# Patient Record
Sex: FEMALE | Race: WHITE | Employment: OTHER | RURAL
[De-identification: names, ages, dates, MRNs, and addresses within clinical notes are randomized per-mention and may not be internally consistent; named-entity substitution may affect disease eponyms.]

---

## 2017-04-18 DIAGNOSIS — I10 ESSENTIAL HYPERTENSION WITH GOAL BLOOD PRESSURE LESS THAN 130/80: ICD-10-CM

## 2017-04-24 RX ORDER — CLONIDINE HYDROCHLORIDE 0.1 MG/1
TABLET ORAL
Qty: 60 TAB | Refills: 0 | Status: SHIPPED | OUTPATIENT
Start: 2017-04-24 | End: 2017-06-20 | Stop reason: SDUPTHER

## 2017-04-24 NOTE — TELEPHONE ENCOUNTER
Refill request received from [facility] on 4/18/17. Last office visit was 9/21/16.

## 2017-06-20 ENCOUNTER — OFFICE VISIT (OUTPATIENT)
Dept: FAMILY MEDICINE CLINIC | Age: 67
End: 2017-06-20

## 2017-06-20 VITALS
SYSTOLIC BLOOD PRESSURE: 139 MMHG | BODY MASS INDEX: 27.43 KG/M2 | HEART RATE: 65 BPM | DIASTOLIC BLOOD PRESSURE: 78 MMHG | HEIGHT: 68 IN | TEMPERATURE: 99 F | WEIGHT: 181 LBS | RESPIRATION RATE: 16 BRPM | OXYGEN SATURATION: 97 %

## 2017-06-20 DIAGNOSIS — F33.41 RECURRENT MAJOR DEPRESSIVE DISORDER, IN PARTIAL REMISSION (HCC): ICD-10-CM

## 2017-06-20 DIAGNOSIS — I48.0 PAF (PAROXYSMAL ATRIAL FIBRILLATION) (HCC): ICD-10-CM

## 2017-06-20 DIAGNOSIS — I10 ESSENTIAL HYPERTENSION WITH GOAL BLOOD PRESSURE LESS THAN 130/80: Primary | ICD-10-CM

## 2017-06-20 DIAGNOSIS — Z12.31 ENCOUNTER FOR SCREENING MAMMOGRAM FOR MALIGNANT NEOPLASM OF BREAST: ICD-10-CM

## 2017-06-20 DIAGNOSIS — Z13.39 SCREENING FOR ALCOHOLISM: ICD-10-CM

## 2017-06-20 DIAGNOSIS — Z00.00 MEDICARE ANNUAL WELLNESS VISIT, SUBSEQUENT: ICD-10-CM

## 2017-06-20 DIAGNOSIS — M47.9 OSTEOARTHRITIS OF SPINE, UNSPECIFIED SPINAL OSTEOARTHRITIS COMPLICATION STATUS, UNSPECIFIED SPINAL REGION: ICD-10-CM

## 2017-06-20 DIAGNOSIS — E78.00 HYPERCHOLESTEROLEMIA: ICD-10-CM

## 2017-06-20 DIAGNOSIS — E11.9 TYPE 2 DIABETES MELLITUS WITHOUT COMPLICATION, WITHOUT LONG-TERM CURRENT USE OF INSULIN (HCC): ICD-10-CM

## 2017-06-20 DIAGNOSIS — Z98.84 STATUS POST GASTRIC BANDING: ICD-10-CM

## 2017-06-20 DIAGNOSIS — Z00.00 ROUTINE GENERAL MEDICAL EXAMINATION AT A HEALTH CARE FACILITY: ICD-10-CM

## 2017-06-20 RX ORDER — FLUOXETINE HYDROCHLORIDE 20 MG/1
20 CAPSULE ORAL DAILY
Qty: 90 CAP | Refills: 3 | Status: SHIPPED | OUTPATIENT
Start: 2017-06-20 | End: 2019-01-10

## 2017-06-20 RX ORDER — AMLODIPINE BESYLATE 10 MG/1
10 TABLET ORAL DAILY
Qty: 90 TAB | Refills: 3 | Status: SHIPPED | OUTPATIENT
Start: 2017-06-20 | End: 2019-01-16

## 2017-06-20 RX ORDER — LIDOCAINE 50 MG/G
PATCH TOPICAL
Qty: 1 EACH | Refills: 0 | Status: SHIPPED | OUTPATIENT
Start: 2017-06-20 | End: 2019-01-16

## 2017-06-20 RX ORDER — DIGOXIN 125 MCG
TABLET ORAL
Qty: 90 TAB | Refills: 3 | Status: SHIPPED | OUTPATIENT
Start: 2017-06-20 | End: 2019-01-10 | Stop reason: SDUPTHER

## 2017-06-20 RX ORDER — CLONIDINE HYDROCHLORIDE 0.1 MG/1
0.1 TABLET ORAL 2 TIMES DAILY
Qty: 60 TAB | Refills: 5 | Status: SHIPPED | OUTPATIENT
Start: 2017-06-20 | End: 2019-01-10 | Stop reason: SDUPTHER

## 2017-06-20 NOTE — MR AVS SNAPSHOT
Visit Information Date & Time Provider Department Dept. Phone Encounter #  
 6/20/2017  2:40 PM Paxton Velazquez MD [address] [id number] Follow-up Instructions Return in about 6 months (around 12/20/2017) for HTN, DM. Upcoming Health Maintenance Date Due DTaP/Tdap/Td series (1 - Tdap) 11/6/1971 EYE EXAM RETINAL OR DILATED Q1 7/15/2014 GLAUCOMA SCREENING Q2Y 11/6/2015 OSTEOPOROSIS SCREENING (DEXA) 11/6/2015 Pneumococcal 65+ Low/Medium Risk (1 of 2 - PCV13) 11/6/2015 BREAST CANCER SCRN MAMMOGRAM 1/10/2016 HEMOGLOBIN A1C Q6M 3/22/2017 INFLUENZA AGE 9 TO ADULT 8/1/2017 FOOT EXAM Q1 9/21/2017 MICROALBUMIN Q1 9/22/2017 LIPID PANEL Q1 9/22/2017 MEDICARE YEARLY EXAM 6/21/2018 COLONOSCOPY 2/15/2022 Allergies as of 6/20/2017  Review Complete On: 6/20/2017 By: Paxton Velazquez MD  
  
 Severity Noted Reaction Type Reactions Crestor [Rosuvastatin]  05/27/2010    Myalgia Lipitor [Atorvastatin]  05/27/2010    Myalgia Sulfa (Sulfonamide Antibiotics)  05/27/2010    Hives Tetanus Vaccines And Toxoid  11/01/2010    Other (comments) Local reaction--arm red and swollen Current Immunizations  Never Reviewed Name Date Influenza Vaccine 9/16/2016, 10/25/2014 Zoster 12/12/2012 Not reviewed this visit You Were Diagnosed With   
  
 Codes Comments Essential hypertension with goal blood pressure less than 130/80    -  Primary ICD-10-CM: I10 
ICD-9-CM: 401.9 Hypercholesterolemia     ICD-10-CM: E78.00 ICD-9-CM: 272.0 Type 2 diabetes mellitus without complication, without long-term current use of insulin (HCC)     ICD-10-CM: E11.9 ICD-9-CM: 250.00 PAF (paroxysmal atrial fibrillation) (HCC)     ICD-10-CM: I48.0 ICD-9-CM: 427.31  Recurrent major depressive disorder, in partial remission ([facility]     ICD-10-CM: F33.41 
ICD-9-CM: 296.35   
 Status post gastric banding     ICD-10-CM: Z98.84 ICD-9-CM: V45.86 Osteoarthritis of spine, unspecified spinal osteoarthritis complication status, unspecified spinal region     ICD-10-CM: M47.9 ICD-9-CM: 721.90 Vitals BP Pulse Temp Resp Height(growth percentile) Weight(growth percentile) 139/78 (BP 1 Location: Left arm, BP Patient Position: Sitting) 65 99 °F (37.2 °C) (Oral) 16 5' 8\" (1.727 m) 181 lb (82.1 kg) SpO2 BMI OB Status Smoking Status 97% 27.52 kg/m2 Hysterectomy Never Smoker BMI and BSA Data Body Mass Index Body Surface Area  
 27.52 kg/m 2 1.98 m 2 Preferred Pharmacy Pharmacy Name Phone [facility] [address] [phone] Your Updated Medication List  
  
   
This list is accurate as of: 6/20/17  3:47 PM.  Always use your most recent med list. amLODIPine 10 mg tablet Commonly known as:  Nany Half Take 1 Tab by mouth daily. betamethasone valerate 0.1 % topical cream  
Commonly known as:  VALISONE  
APPLY TO AFFECTED AREA 2 TIMES A DAY Blood-Glucose Meter monitoring kit Commonly known as:  BLOOD GLUCOSE MONITORING Check BS daily  
  
 cloNIDine HCl 0.1 mg tablet Commonly known as:  CATAPRES Take 1 Tab by mouth two (2) times a day. digoxin 0.125 mg tablet Commonly known as:  LANOXIN  
TAKE ONE TABLET BY MOUTH EVERY DAY FLUoxetine 20 mg capsule Commonly known as:  PROzac Take 1 Cap by mouth daily. glucosamine-chondroitin 500-400 mg Cap Commonly known as:  20 [facility] Take 1 Cap by mouth daily. lidocaine 5 % Commonly known as:  [place] Apply patch to the affected area for 12 hours a day and remove for 12 hours a day. M-VIT PO Take 1 Tab by mouth daily. magnesium oxide 400 mg tablet Commonly known as:  MAG-OX Take 400 mg by mouth daily. VITAMIN C PO Take 1 Tab by mouth daily. VITAMIN D3 1,000 unit tablet Generic drug:  cholecalciferol Take 1,000 Units by mouth daily. VITAMIN E PO Take 1 Tab by mouth daily. Prescriptions Sent to Pharmacy Refills  
 cloNIDine HCl (CATAPRES) 0.1 mg tablet 5 Sig: Take 1 Tab by mouth two (2) times a day. Class: Normal  
 Pharmacy: [address] [place] [address]. [address] [place] [address] Ph #: [phone] Route: Oral  
 amLODIPine (NORVASC) 10 mg tablet 3 Sig: Take 1 Tab by mouth daily. Class: Normal  
 Pharmacy: [address] [place] [address]. [address] [place] [address] Ph #: [phone] Route: Oral  
 digoxin (LANOXIN) 0.125 mg tablet 3 Sig: TAKE ONE TABLET BY MOUTH EVERY DAY Class: Normal  
 Pharmacy: [address] [place] [address]. Rd[address] [place] [address] Ph #: [phone] FLUoxetine (PROZAC) 20 mg capsule 3 Sig: Take 1 Cap by mouth daily. Class: Normal  
 Pharmacy: [address] [place] [address]. [address] [place] [address] Ph #: [phone] Route: Oral  
 lidocaine (LIDODERM) 5 % 0 Sig: Apply patch to the affected area for 12 hours a day and remove for 12 hours a day. Class: Normal  
 Pharmacy: [address] [place] [address]. [address] [place] [address] Ph #: [phone] We Performed the Following AMB POC FUNDUS PHOTOGRAPHY WITH INTERP AND REPORT [45120 CPT(R)] DIGOXIN [07348 CPT(R)] HEMOGLOBIN A1C WITH EAG [67199 CPT(R)] LIPID PANEL [85094 CPT(R)] METABOLIC PANEL, COMPREHENSIVE [95584 CPT(R)] REFERRAL TO CARDIOLOGY [ZZS38 Custom] Follow-up Instructions Return in about 6 months (around 12/20/2017) for HTN, DM. Referral Information Referral ID Referred By Referred To  
  
 [id number] [address] [place] [address] [address] Phone: [phone] Fax: [phone] Visits Status Start Date End Date 1 New Request 6/20/17 6/20/18 If your referral has a status of pending review or denied, additional information will be sent to support the outcome of this decision. Patient Instructions Learning About High Blood Pressure What is high blood pressure? Blood pressure is a measure of how hard the blood pushes against the walls of your arteries. It's normal for blood pressure to go up and down throughout the day, but if it stays up, you have high blood pressure. Another name for high blood pressure is hypertension. Two numbers tell you your blood pressure. The first number is the systolic pressure. It shows how hard the blood pushes when your heart is pumping. The second number is the diastolic pressure. It shows how hard the blood pushes between heartbeats, when your heart is relaxed and filling with blood. A blood pressure of less than 120/80 (say \"120 over 80\") is ideal for an adult. High blood pressure is 140/90 or higher. You have high blood pressure if your top number is 140 or higher or your bottom number is 90 or higher, or both. Many people fall into the category in between, called prehypertension. People with prehypertension need to make lifestyle changes to bring their blood pressure down and help prevent or delay high blood pressure. What happens when you have high blood pressure? · Blood flows through your arteries with too much force. Over time, this damages the walls of your arteries. But you can't feel it. High blood pressure usually doesn't cause symptoms. · Fat and calcium start to build up in your arteries. This buildup is called plaque. Plaque makes your arteries narrower and stiffer. Blood can't flow through them as easily. · This lack of good blood flow starts to damage some of the organs in your body. This can lead to problems such as coronary artery disease and heart attack, heart failure, stroke, kidney failure, and eye damage. How can you prevent high blood pressure? · Stay at a healthy weight. · Try to limit how much sodium you eat to less than 2,300 milligrams (mg) a day. If you limit your sodium to 1,500 mg a day, you can lower your blood pressure even more. ¨ Buy foods that are labeled \"unsalted,\" \"sodium-free,\" or \"low-sodium. \" Foods labeled \"reduced-sodium\" and \"light sodium\" may still have too much sodium. ¨ Flavor your food with garlic, lemon juice, onion, vinegar, herbs, and spices instead of salt. Do not use soy sauce, steak sauce, onion salt, garlic salt, mustard, or ketchup on your food. ¨ Use less salt (or none) when recipes call for it. You can often use half the salt a recipe calls for without losing flavor. · Be physically active. Get at least 30 minutes of exercise on most days of the week. Walking is a good choice. You also may want to do other activities, such as running, swimming, cycling, or playing tennis or team sports. · Limit alcohol to 2 drinks a day for men and 1 drink a day for women. · Eat plenty of fruits, vegetables, and low-fat dairy products. Eat less saturated and total fats. How is high blood pressure treated? · Your doctor will suggest making lifestyle changes. For example, your doctor may ask you to eat healthy foods, quit smoking, lose extra weight, and be more active. · If lifestyle changes don't help enough or your blood pressure is very high, you will have to take medicine every day. Follow-up care is a key part of your treatment and safety. Be sure to make and go to all appointments, and call your doctor if you are having problems. It's also a good idea to know your test results and keep a list of the medicines you take. Where can you learn more? Go to http://joseph-joel.info/. Enter P501 in the search box to learn more about \"Learning About High Blood Pressure. \" Current as of: March 23, 2016 Content Version: 11.3 © 2358-9141 Healthwise, Incorporated. Care instructions adapted under license by [employer] (which disclaims liability or warranty for this information). If you have questions about a medical condition or this instruction, always ask your healthcare professional. Isha Wisdom any warranty or liability for your use of this information. Medicare Part B Preventive Services Guidelines/Limitations Date last completed and Frequency Due Date Bone Mass Measurement 
(age 72 & older, biennial) Requires diagnosis related to osteoporosis or estrogen deficiency. Biennial benefit unless patient has history of long-term glucocorticoid tx or baseline is needed because initial test was by other method Completed Recommended every 2 years As recommended by your PCP or Specialist 
  
Cardiovascular Screening Blood Tests (every 5 years) Total cholesterol, HDL, Triglycerides Order as a panel if possible Completed 9/22/2016 As recommended by your PCP As recommended by your PCP or Specialist  
Colorectal Cancer Screening 
-Fecal occult blood test (annual) -Flexible sigmoidoscopy (5y) 
-Screening colonoscopy (10y) -Barium Enema Age 49-80; After age [de-identified] if history of abnormal results Completed 2/15/2012 Recommended every 5 to 10 years  As recommended by your PCP or Specialist-[facility] Counseling to Prevent Tobacco Use (up to 8 sessions per year) - Counseling greater than 3 and up to 10 minutes - Counseling greater than 10 minutes Patients must be asymptomatic of tobacco-related conditions to receive as preventive service N/A N/A Diabetes Screening Tests (at least every 3 years, Medicare covers annually or at 6-month intervals for prediabetic patients) Fasting blood sugar (FBS) or glucose tolerance test (GTT) Patient must be diagnosed with one of the following: 
-Hypertension, Dyslipidemia, obesity, previous impaired FBS or GTT Or any two of the following: overweight, FH of diabetes, age ? 72, history of gestational diabetes, birth of baby weighing more than 9 pounds Completed 9/22/2016 Recommended every 3 years for non-diabetics Recommended every 3-6 months for Pre-Diabetics and Diabetics As recommended by your PCP or Specialist 
  
Glaucoma Screening (no USPSTF recommendation) Diabetes mellitus, family history, , age 48 or over,  American, age 72 or over Completed Recommended annually As recommended by your PCP or Specialist- 
Bianka Melara Seasonal Influenza Vaccination (annually)  Completed Recommended Annually Due Fall 2017 TDAP Vaccination  Completed Recommended every 10 years Allergic to Tdap  
Zoster (Shingles) Vaccination Covered by Medicare Part D through the pharmacy- PCP provides prescription Completed 12/12/2012 Recommended once over age 48  Complete Pneumococcal Vaccination (once after 65)  Pneumo 23- Recommended once over the age of 72 Prevnar 13- 11/6/2015 Recommended once over the age of 72 Completed Screening Mammography (biennial age 54-69) Annually (age 36 or over) Completed 1/10/2014 As recommended by your PCP or Specialist-Referral to [address] [place] [address] Screening Pap Tests and Pelvic Examination (up to age 79 and after 79 if unknown history or abnormal study last 10 years) Every 24 months except high risk As recommended by your PCP or Specialist 
 As recommended by your PCP or Specialist 
  
 
 
  
Introducing [facility]! Dear Abram Hernandez: Thank you for requesting a Peak account. Our records indicate that you already have an active Peak account. You can access your account anytime at [email or web address] Did you know that you can access your hospital and ER discharge instructions at any time in Peak?   You can also review all of your test results from your hospital stay or ER visit. Additional Information If you have questions, please visit the Frequently Asked Questions section of the Learning Hyperdrive website at [email or web address] Remember, Learning Hyperdrive is NOT to be used for urgent needs. For medical emergencies, dial 911. Now available from your iPhone and Android! Please provide this summary of care documentation to your next provider. If you have any questions after today's visit, please call [phone].

## 2017-06-20 NOTE — PROGRESS NOTES
Reviewed record in preparation for visit and have necessary documentation  Pt did not bring medication to office visit for review  opportunity was given for questions  Goals that were addressed and/or need to be completed during or after this appointment include     Health Maintenance Due   Topic Date Due    DTaP/Tdap/Td series (1 - Tdap) 11/06/1971    EYE EXAM RETINAL OR DILATED Q1  07/15/2014    GLAUCOMA SCREENING Q2Y  11/06/2015    OSTEOPOROSIS SCREENING (DEXA)  11/06/2015    Pneumococcal 65+ Low/Medium Risk (1 of 2 - PCV13) 11/06/2015    MEDICARE YEARLY EXAM  11/06/2015    BREAST CANCER SCRN MAMMOGRAM  01/10/2016    HEMOGLOBIN A1C Q6M  03/22/2017     - check for functional glucose monitor and record keeping system  Pt was given BS record log to document home readings and return to office for review  Diabetic Bundle:    LDL-  A1C- 6.3  BP- 139/78  Smoking? No  Anticoagulation medication? No  Eye exam dilated?  Due  Foot exam?Yes

## 2017-06-20 NOTE — PATIENT INSTRUCTIONS
Learning About High Blood Pressure  What is high blood pressure? Blood pressure is a measure of how hard the blood pushes against the walls of your arteries. It's normal for blood pressure to go up and down throughout the day, but if it stays up, you have high blood pressure. Another name for high blood pressure is hypertension. Two numbers tell you your blood pressure. The first number is the systolic pressure. It shows how hard the blood pushes when your heart is pumping. The second number is the diastolic pressure. It shows how hard the blood pushes between heartbeats, when your heart is relaxed and filling with blood. A blood pressure of less than 120/80 (say \"120 over 80\") is ideal for an adult. High blood pressure is 140/90 or higher. You have high blood pressure if your top number is 140 or higher or your bottom number is 90 or higher, or both. Many people fall into the category in between, called prehypertension. People with prehypertension need to make lifestyle changes to bring their blood pressure down and help prevent or delay high blood pressure. What happens when you have high blood pressure? · Blood flows through your arteries with too much force. Over time, this damages the walls of your arteries. But you can't feel it. High blood pressure usually doesn't cause symptoms. · Fat and calcium start to build up in your arteries. This buildup is called plaque. Plaque makes your arteries narrower and stiffer. Blood can't flow through them as easily. · This lack of good blood flow starts to damage some of the organs in your body. This can lead to problems such as coronary artery disease and heart attack, heart failure, stroke, kidney failure, and eye damage. How can you prevent high blood pressure? · Stay at a healthy weight. · Try to limit how much sodium you eat to less than 2,300 milligrams (mg) a day.  If you limit your sodium to 1,500 mg a day, you can lower your blood pressure even more.  ¨ Buy foods that are labeled \"unsalted,\" \"sodium-free,\" or \"low-sodium. \" Foods labeled \"reduced-sodium\" and \"light sodium\" may still have too much sodium. ¨ Flavor your food with garlic, lemon juice, onion, vinegar, herbs, and spices instead of salt. Do not use soy sauce, steak sauce, onion salt, garlic salt, mustard, or ketchup on your food. ¨ Use less salt (or none) when recipes call for it. You can often use half the salt a recipe calls for without losing flavor. · Be physically active. Get at least 30 minutes of exercise on most days of the week. Walking is a good choice. You also may want to do other activities, such as running, swimming, cycling, or playing tennis or team sports. · Limit alcohol to 2 drinks a day for men and 1 drink a day for women. · Eat plenty of fruits, vegetables, and low-fat dairy products. Eat less saturated and total fats. How is high blood pressure treated? · Your doctor will suggest making lifestyle changes. For example, your doctor may ask you to eat healthy foods, quit smoking, lose extra weight, and be more active. · If lifestyle changes don't help enough or your blood pressure is very high, you will have to take medicine every day. Follow-up care is a key part of your treatment and safety. Be sure to make and go to all appointments, and call your doctor if you are having problems. It's also a good idea to know your test results and keep a list of the medicines you take. Where can you learn more? Go to http://joseph-joel.info/. Enter P501 in the search box to learn more about \"Learning About High Blood Pressure. \"  Current as of: March 23, 2016  Content Version: 11.3  © 6764-9268 [employer]. Care instructions adapted under license by [employer] (which disclaims liability or warranty for this information).  If you have questions about a medical condition or this instruction, always ask your healthcare professional. Healthwise, Incorporated disclaims any warranty or liability for your use of this information. Medicare Part B Preventive Services Guidelines/Limitations Date last completed and Frequency Due Date   Bone Mass Measurement  (age 72 & older, biennial) Requires diagnosis related to osteoporosis or estrogen deficiency. Biennial benefit unless patient has history of long-term glucocorticoid tx or baseline is needed because initial test was by other method Completed     Recommended every 2 years As recommended by your PCP or Specialist     Cardiovascular Screening Blood Tests (every 5 years)  Total cholesterol, HDL, Triglycerides Order as a panel if possible Completed     9/22/2016    As recommended by your PCP As recommended by your PCP or Specialist   Colorectal Cancer Screening  -Fecal occult blood test (annual)  -Flexible sigmoidoscopy (5y)  -Screening colonoscopy (10y)  -Barium Enema Age 49-80; After age [de-identified] if history of abnormal results Completed      2/15/2012    Recommended every 5 to 10 years  As recommended by your PCP or Specialist-[facility]     Counseling to Prevent Tobacco Use (up to 8 sessions per year)  - Counseling greater than 3 and up to 10 minutes  - Counseling greater than 10 minutes Patients must be asymptomatic of tobacco-related conditions to receive as preventive service N/A N/A   Diabetes Screening Tests (at least every 3 years, Medicare covers annually or at 6-month intervals for prediabetic patients)    Fasting blood sugar (FBS) or glucose tolerance test (GTT) Patient must be diagnosed with one of the following:  -Hypertension, Dyslipidemia, obesity, previous impaired FBS or GTT  Or any two of the following: overweight, FH of diabetes, age ? 72, history of gestational diabetes, birth of baby weighing more than 9 pounds Completed     9/22/2016    Recommended every 3 years for non-diabetics    Recommended every 3-6 months for Pre-Diabetics and Diabetics As recommended by your PCP or Specialist     Glaucoma Screening (no USPSTF recommendation) Diabetes mellitus, family history, , age 48 or over,  American, age 72 or over Completed     Recommended annually As recommended by your PCP or Specialist-  [facility], [place]     Seasonal Influenza Vaccination (annually)  Completed   Recommended Annually Due Fall 2017   TDAP Vaccination  Completed     Recommended every 10 years Allergic to Tdap   Zoster (Shingles) Vaccination Covered by Medicare Part D through the pharmacy- PCP provides prescription Completed     12/12/2012    Recommended once over age 48  Complete   Pneumococcal Vaccination (once after 72)  Pneumo 23-   Recommended once over the age of 72    Prevnar 15- 11/6/2015 Recommended once over the age of 72 Completed            Screening Mammography (biennial age 54-69) Annually (age 36 or over) Completed   1/10/2014 As recommended by your PCP or Specialist-Referral to [address] [place] [address]     Screening Pap Tests and Pelvic Examination (up to age 79 and after 79 if unknown history or abnormal study last 10 years) Every 24 months except high risk As recommended by your PCP or Specialist   As recommended by your PCP or Specialist

## 2017-06-20 NOTE — PROGRESS NOTES
This is a Subsequent Medicare Annual Wellness Visit providing Personalized Prevention Plan Services (PPPS) (Performed 12 months after initial AWV and PPPS )    I have reviewed the patient's medical history in detail and updated the computerized patient record. History     Past Medical History:   Diagnosis Date    Arthritis     Depression     Diabetes ([address]     Hypercholesterolemia     Hypertension     PAF (paroxysmal atrial fibrillation) ([facility])     Sleep apnea     Status post gastric banding       Past Surgical History:   Procedure Laterality Date    CARDIAC CATHETERIZATION      HX APPENDECTOMY      HX GI  2006    lap band    HX HYSTERECTOMY  1995     Current Outpatient Prescriptions   Medication Sig Dispense Refill    cloNIDine HCl (CATAPRES) 0.1 mg tablet Take 1 Tab by mouth two (2) times a day. 60 Tab 5    amLODIPine (NORVASC) 10 mg tablet Take 1 Tab by mouth daily. 90 Tab 3    digoxin (LANOXIN) 0.125 mg tablet TAKE ONE TABLET BY MOUTH EVERY DAY 90 Tab 3    FLUoxetine (PROZAC) 20 mg capsule Take 1 Cap by mouth daily. 90 Cap 3    lidocaine (LIDODERM) 5 % Apply patch to the affected area for 12 hours a day and remove for 12 hours a day. 1 Each 0    betamethasone valerate (VALISONE) 0.1 % topical cream APPLY TO AFFECTED AREA 2 TIMES A DAY 45 g 2    magnesium oxide (MAG-OX) 400 mg tablet Take 400 mg by mouth daily.  glucosamine-chondroitin (ARTHX) 500-400 mg cap Take 1 Cap by mouth daily.  Blood-Glucose Meter (BLOOD GLUCOSE MONITORING) monitoring kit Check BS daily 1 Kit 0    cholecalciferol, vitamin d3, (VITAMIN D) 1,000 unit tablet Take 1,000 Units by mouth daily.  PV W-O MAGDIEL/FERROUS FUMARATE/FA (M-VIT PO) Take 1 Tab by mouth daily.  ASCORBIC ACID (VITAMIN C PO) Take 1 Tab by mouth daily.  VITAMIN E ACETATE (VITAMIN E PO) Take 1 Tab by mouth daily. Medication reconciliation completed by MA/ LPN and reviewed by PCP.       Allergies   Allergen Reactions    Crestor [Rosuvastatin] Myalgia    Lipitor [Atorvastatin] Myalgia    Sulfa (Sulfonamide Antibiotics) Hives    Tetanus Vaccines And Toxoid Other (comments)     Local reaction--arm red and swollen       Family History   Problem Relation Age of Onset    Diabetes Mother     Hypertension Mother     Heart Disease Mother     Stroke Mother     Hypertension Father     Stroke Father     Arthritis-rheumatoid Father     Alcohol abuse Paternal Grandfather     Asthma Neg Hx     Bleeding Prob Neg Hx     Cancer Neg Hx     Elevated Lipids Neg Hx     Headache Neg Hx     Lung Disease Neg Hx     Migraines Neg Hx     Psychiatric Disorder Neg Hx     Mental Retardation Neg Hx      Social History   Substance Use Topics    Smoking status: Never Smoker    Smokeless tobacco: Never Used    Alcohol use No     Patient resides alone and remains independent. Patient Active Problem List   Diagnosis Code    Hypertension I10    Hypercholesterolemia E78.00    Diabetes ([facility]) E11.9    Sleep apnea G47.30    PAF ([facility]oxysmal [facility] fibrillation) ([facility]) I48.0    Status post gastric banding Z98.84    Glucose intolerance (impaired glucose tolerance) R73.02       Depression Risk Factor Screening:     PHQ over the last two weeks 6/20/2017   Little interest or pleasure in doing things Not at all   Feeling down, depressed or hopeless Not at all   Total Score PHQ 2 0     Patient denies thoughts of harm to self or others. Patient states there is a strong support system within friends & family. Patient reports taking Prozac 20mg which is a decrease from 80mg. Alcohol Risk Factor Screening: On any occasion during the past 3 months, have you had more than 3 drinks containing alcohol? No    Do you average more than 7 drinks per week? No      Functional Ability and Level of Safety:     Hearing Loss   none    Activities of Daily Living   Self-care.    Requires assistance with: no ADLs    ADL Assessment 6/20/2017   Feeding yourself No Help Needed   Getting from bed to chair No Help Needed   Getting dressed No Help Needed   Bathing or showering No Help Needed   Walk across the room (includes cane/walker) No Help Needed   Using the telphone No Help Needed   Taking your medications No Help Needed   Preparing meals No Help Needed   Managing money (expenses/bills) No Help Needed   Moderately strenuous housework (laundry) No Help Needed   Shopping for personal items (toiletries/medicines) No Help Needed   Shopping for groceries No Help Needed   Driving No Help Needed   Climbing a flight of stairs No Help Needed   Getting to places beyond walking distances No Help Needed         Fall Risk     Fall Risk Assessment, last 12 mths 6/20/2017   Able to walk? Yes   Fall in past 12 months? No     Patient denies falls within the past year & verbalizes awareness of fall prevention strategies. Abuse Screen   Patient is not abused    Review of Systems   Not required    Physical Examination     Evaluation of Cognitive Function:  Mood/affect:  happy  Appearance: age appropriate, casually dressed and within normal Limits  Family member/caregiver input: None Present. No exam performed today, Medicare Wellness. Patient Care Team:  Rosalba Wilburn RN as Nurse Navigator    Advice/Referrals/Counseling   Education and counseling provided:  Influenza Vaccine  Screening Mammography      Assessment/Plan     RN discussed with patient about an Advanced Medical Directive. Provided patient blank Advanced Medical Directive. RN reviewed Advanced Medical Directive paperwork with patient with a brief description of how to complete the form. Requested that if document completed, to please provide a copy of completed Advanced Medical Directive to PCP office. Patient verbalized understanding. Patient is up to date on the following immunizations: Seasonal Influenza (Fall 2017); Tdap (Allergic reactions); Zoster (completed 12/12/2012);  Pneumococcal (completed 11/6/2015). Patient states that she follows the PCP's recommendations for the following screenings: Mammography (completed 1/10/2014; patient in agreement with referral to [address] [place] [address]), pap/ pelvic (per PCP recommendation), DEXA scan (per PCP recommendation) & colonoscopy (completed 2/15/2012 via [facility]). Patient is established with [employer], [place]. Patient will follow up with Dr. Rice Bernheim, [place] regarding cataract evaluation. Patient verbalized understanding of all information discussed. Patient was given the opportunity to ask questions.

## 2017-06-20 NOTE — ACP (ADVANCE CARE PLANNING)
RN discussed with patient about an Advanced Medical Directive. Provided patient blank Advanced Medical Directive. RN reviewed Advanced Medical Directive paperwork with patient with a brief description of how to complete the form. Requested that if document completed, to please provide a copy of completed Advanced Medical Directive to PCP office. Patient verbalized understanding.

## 2017-06-20 NOTE — PROGRESS NOTES
Progress Note    Patient: Obey Montes De Oca MRN: [id number]  SSN: [id number]    YOB: 1950  Age: 77 y.o. Sex: female        Chief Complaint   Patient presents with    Diabetes    Annual Wellness Visit         Subjective:     Encounter Diagnoses   Name Primary?  Essential hypertension with goal blood pressure less than 130/80 Yes    Hypercholesterolemia     Type 2 diabetes mellitus without complication, without long-term current use of insulin (HCC)     PAF (paroxysmal atrial fibrillation) (HCC)     Recurrent major depressive disorder[facility] partial [facility]     Status post gastric banding     Osteoarthritis of spine, unspecified spinal osteoarthritis complication status, unspecified spinal region      Hypertension: Controlled   BP Readings from Last 3 Encounters:   06/20/17 139/78   09/21/16 138/60   11/04/15 143/82     The patient reports:  taking medications as instructed, no medication side effects noted, no TIA's, no chest pain on exertion, no dyspnea on exertion, no swelling of ankles. Lab Results   Component Value Date/Time    Sodium 142 09/22/2016 08:42 AM    Potassium 4.2 09/22/2016 08:42 AM    Chloride 98 09/22/2016 08:42 AM    CO2 27 09/22/2016 08:42 AM    Anion gap 6 10/26/2009 10:19 AM    Glucose 89 09/22/2016 08:42 AM    BUN 18 09/22/2016 08:42 AM    Creatinine 0.72 09/22/2016 08:42 AM    BUN/Creatinine ratio 25 09/22/2016 08:42 AM    GFR est  09/22/2016 08:42 AM    GFR est non-AA 88 09/22/2016 08:42 AM    Calcium 9.4 09/22/2016 08:42 AM    Bilirubin, total 0.4 09/22/2016 08:42 AM    AST (SGOT) 14 09/22/2016 08:42 AM    Alk. phosphatase 92 09/22/2016 08:42 AM    Protein, total 6.9 09/22/2016 08:42 AM    Albumin 4.1 09/22/2016 08:42 AM    Globulin 3.4 04/20/2009 09:08 AM    A-G Ratio 1.5 09/22/2016 08:42 AM    ALT (SGPT) 11 09/22/2016 08:42 AM     Our goal is to normalize the blood pressure to decrease the risks of strokes and heart attacks.  The patient is in agreement with the plan. Hyperlipidemia:  Stable  Cardiovascular risks for her are: hypertension  hyperlipidemia. Currently she takes none. Lab Results   Component Value Date/Time    Cholesterol, total 190 09/22/2016 08:42 AM    HDL Cholesterol 55 09/22/2016 08:42 AM    LDL, calculated 122 09/22/2016 08:42 AM    Triglyceride 63 09/22/2016 08:42 AM    CHOL/HDL Ratio 4.2 04/20/2009 09:08 AM     Lab Results   Component Value Date/Time    ALT (SGPT) 11 09/22/2016 08:42 AM    AST (SGOT) 14 09/22/2016 08:42 AM    Alk. phosphatase 92 09/22/2016 08:42 AM    Bilirubin, total 0.4 09/22/2016 08:42 AM     Myalgias: No  Fatigue: No  Wt Readings from Last 3 Encounters:   06/20/17 181 lb (82.1 kg)   09/21/16 177 lb 3.2 oz (80.4 kg)   11/04/15 170 lb 14.4 oz (77.5 kg)       AFIB: Reports that she was diagnosed with afib in her late 25s and started on Digoxin. Reports last since cardiology went she went to get the gastric [id number] where she  Reports having normal cardiac catheretization. .  Denies chest pain, edema, dyspnea or dizziness. Aware of irregular heartbeats if anxious for drinking caffiene. No neurologic sx. Avoids caffeine. Does not think that medication is helpful. Diabetes Follow up: diet controlled s/p gastric banding   Overall the patient feels well with good energy level. Diabetic Consultants:Endocrinologist - N/A   Insulin dependence: NO   Pertinent Labs:   Lab Results   Component Value Date/Time    Hemoglobin A1c 6.5 09/22/2016 08:42 AM        Lab Results   Component Value Date/Time    Microalbumin/Creat ratio (mg/g creat) 98 10/26/2009 10:19 AM    Microalb/Creat ratio (ug/mg creat.) 107.0 09/22/2016 08:42 AM    Microalbumin,urine random 6.26 10/26/2009 10:19 AM      Body mass index is 27.52 kg/(m^2).      Lab Results   Component Value Date/Time    LDL, calculated 122 09/22/2016 08:42 AM      Depression: YES     Wt Readings from Last 3 Encounters:   06/20/17 181 lb (82.1 kg)   09/21/16 177 lb 3.2 oz (80.4 kg)   11/04/15 170 lb 14.4 oz (77.5 kg)        History   Smoking Status    Never Smoker   Smokeless Tobacco    Never Used        Questions regarding medications, diet and exercise were answered. Goal of A1C of less than 6.5% discussed. Diabetic foot care was discussed. Depression: Controlled on current medications. The patient has no suicidal ideation, psychotic features or addictions. Current and past medical information:    Current Medications after this visit[de-identified]     Current Outpatient Prescriptions   Medication Sig    cloNIDine HCl (CATAPRES) 0.1 mg tablet Take 1 Tab by mouth two (2) times a day.  amLODIPine (NORVASC) 10 mg tablet Take 1 Tab by mouth daily.  digoxin (LANOXIN) 0.125 mg tablet TAKE ONE TABLET BY MOUTH EVERY DAY    FLUoxetine (PROZAC) 20 mg capsule Take 1 Cap by mouth daily.  lidocaine (LIDODERM) 5 % Apply patch to the affected area for 12 hours a day and remove for 12 hours a day.  betamethasone valerate (VALISONE) 0.1 % topical cream APPLY TO AFFECTED AREA 2 TIMES A DAY    magnesium oxide (MAG-OX) 400 mg tablet Take 400 mg by mouth daily.  glucosamine-chondroitin (ARTHX) 500-400 mg cap Take 1 Cap by mouth daily.  Blood-Glucose Meter (BLOOD GLUCOSE MONITORING) monitoring kit Check BS daily    cholecalciferol, vitamin d3, (VITAMIN D) 1,000 unit tablet Take 1,000 Units by mouth daily.  PV W-O MAGDIEL/FERROUS FUMARATE/FA (M-VIT PO) Take 1 Tab by mouth daily.  ASCORBIC ACID (VITAMIN C PO) Take 1 Tab by mouth daily.  VITAMIN E ACETATE (VITAMIN E PO) Take 1 Tab by mouth daily. No current facility-administered medications for this visit.         Patient Active Problem List    Diagnosis Date Noted    Glucose intolerance (impaired glucose tolerance) 11/01/2010    Hypertension     Hypercholesterolemia     [facility] ([facility]     Sleep apnea     [facility]     Status post gastric banding        Past Medical History:   Diagnosis Date    Arthritis     Depression     [facility]     Hypercholesterolemia     Hypertension     PAF (paroxysmal atrial fibrillation) (HCC)     Sleep apnea     Status post gastric banding        Allergies   Allergen Reactions    Crestor [Rosuvastatin] Myalgia    Lipitor [Atorvastatin] Myalgia    Sulfa (Sulfonamide Antibiotics) Hives    Tetanus Vaccines And Toxoid Other (comments)     Local reaction--arm red and swollen         Past Surgical History:   Procedure Laterality Date    CARDIAC CATHETERIZATION      HX APPENDECTOMY      HX GI  2006    lap band    HX HYSTERECTOMY  1995       Social History     Social History    Marital status: SINGLE     Spouse name: N/A    Number of children: N/A    Years of education: N/A     Social History Main Topics    Smoking status: Never Smoker    Smokeless tobacco: Never Used    Alcohol use No    Drug use: No    Sexual activity: No     Other Topics Concern    None     Social History Narrative       {Review of Systems   Eyes: Negative for double vision, pain and discharge. Respiratory: Negative for shortness of breath and wheezing. Cardiovascular: Positive for palpitations. Negative for chest pain and leg swelling. Gastrointestinal: Negative for abdominal pain, diarrhea, nausea and vomiting. Musculoskeletal: Negative for myalgias. Neurological: Negative for dizziness and tingling. Objective:     Vitals:    06/20/17 1451   BP: 139/78   Pulse: 65   Resp: 16   Temp: 99 °F (37.2 °C)   TempSrc: Oral   SpO2: 97%   Weight: 181 lb (82.1 kg)   Height: 5' 8\" (1.727 m)      Body mass index is 27.52 kg/(m^2). Physical Exam   Constitutional: She is oriented to person, place, and time. She appears well-developed and well-nourished. HENT:   Head: Normocephalic and atraumatic. Neck: Normal range of motion. Neck supple. Cardiovascular: Normal rate and regular rhythm. Pulmonary/Chest: Effort normal and breath sounds normal.   Abdominal: Soft. Bowel sounds are normal.   Musculoskeletal: Normal range of motion. She exhibits no edema. Neurological: She is alert and oriented to person, place, and time. Health Maintenance Due   Topic Date Due    DTaP/Tdap/Td series (1 - Tdap) 11/06/1971    EYE EXAM RETINAL OR DILATED Q1  07/15/2014    GLAUCOMA SCREENING Q2Y  11/06/2015    OSTEOPOROSIS SCREENING (DEXA)  11/06/2015    Pneumococcal 65+ Low/Medium Risk (1 of 2 - PCV13) 11/06/2015    BREAST CANCER SCRN MAMMOGRAM  01/10/2016    HEMOGLOBIN A1C Q6M  03/22/2017       Assessment and orders:     Encounter Diagnoses     ICD-10-CM ICD-9-CM   1. Essential hypertension with goal blood pressure less than 130/80 I10 401.9   2. Hypercholesterolemia E78.00 272.0   3. Type 2 diabetes mellitus without complication, without long-term current use of insulin (HCC) E11.9 250.00   4. PAF (paroxysmal atrial fibrillation) ([facility]) I48.0 427.31   5. Recurrent major depressive disorder, in partial remission ([facility] F33.41 296.35   6. Status post gastric banding Z98.84 V45.86   7. Osteoarthritis of spine, unspecified spinal osteoarthritis complication status, unspecified spinal region M47.9 721.90       1. Essential hypertension with goal blood pressure less than 130/80  Well controlled. - cloNIDine HCl (CATAPRES) 0.1 mg tablet; Take 1 Tab by mouth two (2) times a day. Dispense: 60 Tab; Refill: 5  - METABOLIC PANEL, COMPREHENSIVE  - amLODIPine (NORVASC) 10 mg tablet; Take 1 Tab by mouth daily. Dispense: 90 Tab; Refill: 3    2. Hypercholesterolemia  Recheck level. - LIPID PANEL    3. Type 2 diabetes mellitus without complication, without long-term current use of insulin ([facility])  Diet controlled. - HEMOGLOBIN A1C WITH EAG    4. PAF (paroxysmal atrial fibrillation) ([facility]  Will refer to cardiology as patient is still having palpitations. And refill dig and check level.    - DIGOXIN  - REFERRAL TO CARDIOLOGY    5. Recurrent major depressive disorder, in partial remission [facility])  Well continued. - FLUoxetine (PROZAC) 20 mg capsule; Take 1 Cap by mouth daily. Dispense: 90 Cap; Refill: 3    6. Status post gastric banding  See BMP    7. Osteoarthritis of spine, unspecified spinal osteoarthritis complication status, unspecified spinal region  - lidocaine (LIDODERM) 5 %; Apply patch to the affected area for 12 hours a day and remove for 12 hours a day. Dispense: 1 Each; Refill: 0          Plan of care:  Discussed diagnoses in detail with patient. Medication risks/benefits/side effects discussed with patient. All of the patient's questions were addressed. The patient understands and agrees with our plan of care. The patient knows to call back if they are unsure of or forget any changes we discussed today or if the symptoms change. The patient received an After-Visit Summary which contains VS, orders, medication list and allergy list. This can be used as a \"mini-medical record\" should they have to seek medical care while out of town. Follow-up Disposition: Not on File    No future appointments.     Signed By: Kianna Lopez MD     June 20, 2017

## 2017-06-21 LAB
ALBUMIN SERPL-MCNC: 3.9 G/DL (ref 3.6–4.8)
ALBUMIN/GLOB SERPL: 1.4 {RATIO} (ref 1.2–2.2)
ALP SERPL-CCNC: 96 IU/L (ref 39–117)
ALT SERPL-CCNC: 8 IU/L (ref 0–32)
AST SERPL-CCNC: 12 IU/L (ref 0–40)
BILIRUB SERPL-MCNC: 0.3 MG/DL (ref 0–1.2)
BUN SERPL-MCNC: 30 MG/DL (ref 8–27)
BUN/CREAT SERPL: 38 (ref 12–28)
CALCIUM SERPL-MCNC: 9.1 MG/DL (ref 8.7–10.3)
CHLORIDE SERPL-SCNC: 105 MMOL/L (ref 96–106)
CHOLEST SERPL-MCNC: 167 MG/DL (ref 100–199)
CO2 SERPL-SCNC: 25 MMOL/L (ref 18–29)
CREAT SERPL-MCNC: 0.8 MG/DL (ref 0.57–1)
DIGOXIN SERPL-MCNC: 0.4 NG/ML (ref 0.5–0.9)
EST. AVERAGE GLUCOSE BLD GHB EST-MCNC: 117 MG/DL
GLOBULIN SER CALC-MCNC: 2.8 G/DL (ref 1.5–4.5)
GLUCOSE SERPL-MCNC: 98 MG/DL (ref 65–99)
HBA1C MFR BLD: 5.7 % (ref 4.8–5.6)
HDLC SERPL-MCNC: 58 MG/DL
LDLC SERPL CALC-MCNC: 95 MG/DL (ref 0–99)
POTASSIUM SERPL-SCNC: 4.1 MMOL/L (ref 3.5–5.2)
PROT SERPL-MCNC: 6.7 G/DL (ref 6–8.5)
SODIUM SERPL-SCNC: 146 MMOL/L (ref 134–144)
TRIGL SERPL-MCNC: 68 MG/DL (ref 0–149)
VLDLC SERPL CALC-MCNC: 14 MG/DL (ref 5–40)

## 2017-06-29 DIAGNOSIS — M47.9 OSTEOARTHRITIS OF SPINE, UNSPECIFIED SPINAL OSTEOARTHRITIS COMPLICATION STATUS, UNSPECIFIED SPINAL REGION: ICD-10-CM

## 2017-06-29 RX ORDER — LIDOCAINE 50 MG/G
PATCH TOPICAL
Qty: 1 EACH | Refills: 0 | Status: CANCELLED | OUTPATIENT
Start: 2017-06-29

## 2017-06-29 NOTE — TELEPHONE ENCOUNTER
Pt is calling about her patches. The [employer] is saying it needs a Prior Auth. She states they work so well for it. Prior Auth number is [id number]. Please call her to let her know about it. Thanks.

## 2017-07-12 ENCOUNTER — OFFICE VISIT (OUTPATIENT)
Dept: CARDIOLOGY CLINIC | Age: 67
End: 2017-07-12

## 2017-07-12 VITALS
HEART RATE: 64 BPM | HEIGHT: 68 IN | BODY MASS INDEX: 26.83 KG/M2 | DIASTOLIC BLOOD PRESSURE: 64 MMHG | SYSTOLIC BLOOD PRESSURE: 124 MMHG | OXYGEN SATURATION: 95 % | WEIGHT: 177 LBS | RESPIRATION RATE: 18 BRPM | TEMPERATURE: 96.7 F

## 2017-07-12 DIAGNOSIS — I10 ESSENTIAL HYPERTENSION: ICD-10-CM

## 2017-07-12 DIAGNOSIS — I48.0 PAF (PAROXYSMAL ATRIAL FIBRILLATION) (HCC): Primary | ICD-10-CM

## 2017-07-12 DIAGNOSIS — E78.00 HYPERCHOLESTEROLEMIA: ICD-10-CM

## 2017-07-12 NOTE — PROGRESS NOTES
Reviewed record in preparation for visit and have necessary documentation  Pt did not bring medication to office visit for review  opportunity was given for questions  Goals that were addressed and/or need to be completed during or after this appointment include     Health Maintenance Due   Topic Date Due    DTaP/Tdap/Td series (1 - Tdap) 11/06/1971    GLAUCOMA SCREENING Q2Y  11/06/2015    OSTEOPOROSIS SCREENING (DEXA)  11/06/2015    Pneumococcal 65+ Low/Medium Risk (1 of 2 - PCV13) 11/06/2015    BREAST CANCER SCRN MAMMOGRAM  01/10/2016

## 2017-07-12 NOTE — PROGRESS NOTES
Abbeyne Hopping      1950   Mannie Cerda MD  Date of Visit-7/12/2017 [facility],  PCP=Marbella Poole MD     [facility]. HPI:   Domonique Jade is a 77 y.o. female   Patient comes to see us with a reported history of atrial fibrillation . She says in her 25s she was diagnosed and put on a low-dose of digoxin. Around 2005 she decided to get a lap band procedure and saw Dr. Frantz Morales and told her that she had a hole in her heart.hole in her heart. She said she underwent a heart .cath. She said everything in order to be normal.    She has no sense of palpitations except when she gets stressed. She said she was placed on warfarin back by Dr. Khanh Noel but decided that she could not take it and does not want to take aspirin either. She said no recent EKG and no echocardiogram since at least 2005. Her physical exam is notable for an irregular heartbeat or ectopy and EKG will be done today. She will be an echocardiogram she will need an echocardiogram. This dictation is incomplete an on edited and is not part of the final record record    Assessment/Plans:  1. PAF (paroxysmal atrial fibrillation) ([address]    2. Essential hypertension    3. Hypercholesterolemia       Unclear is paroxysmal  or chronic really but may not matter based on mangement  EKG with afib at 64  Has rate control  Declines stroke prophylaxis   Issue is what is EF and should we continue the digoxin  Will get an echo and decide  Then fu one year    Future Appointments  Date Time Provider [place]   12/20/2017 8:50 AM Julio Cesar Watts MD [facility] DANILO SCHED   7/11/2018 10:40 AM Mannie Cerda MD [address]       Cardiac History:   No specialty comments available. Review of Systems   Constitutional: Negative for chills and fever. HENT: Negative for hearing loss. Eyes: Negative for blurred vision.    Respiratory: Negative for shortness of breath and wheezing. Cardiovascular: Positive for palpitations. Negative for chest pain. Gastrointestinal: Negative for blood in stool, nausea and vomiting. Genitourinary: Negative for hematuria. Musculoskeletal: Positive for back pain and joint pain. Skin: Negative for rash. Neurological: Positive for dizziness. Negative for seizures and loss of consciousness. Endo/Heme/Allergies: Bruises/bleeds easily. Psychiatric/Behavioral: Negative for memory loss. The patient is nervous/anxious. ROS:Cardiac complete  as above. Respiratory as above with no wheezing or hemoptysis. She denies  symptoms of unusual weight loss , fevers,  BRBPR, hematuria,  or recent stroke    Past Medical History:   Diagnosis Date    Arthritis     Depression     Diabetes ([facility] [address]     Hypercholesterolemia     Hypertension     [facility]     Sleep apnea     Status post gastric banding       Exam and Labs:  Visit Vitals    /64 (BP 1 Location: Left arm, BP Patient Position: Sitting)    Pulse 64    Temp 96.7 °F (35.9 °C) (Oral)    Resp 18    Ht 5' 8\" (1.727 m)    Wt 177 lb (80.3 kg)    SpO2 95%    BMI 26.91 kg/m2     Constitutional:  NAD, comfortable , moist mucous membranesHENT: Head: NC,ATEyes: No scleral icterus. Neck:  Neck supple. No JVD present. No tracheal deviation,mass  Chest: Effort normal & normal respiratory excursion     Lungs:breath sounds normal. No stridor. distress, wheezes or  Rales. Heart:normal rate, regular rhythm, normal S1, S2, no murmurs, rubs, clicks or gallops , PMI non displaced. Edema: Edema is none. Extremities:  no clubbing or cyanosis. Abdominal:  no abnormal distension. Neurological: alert, conversant and oriented . Skin: Skin is not cold. No obvious systemic rash noted. Not diaphoretic. No erythema. Psychiatric:  Grossly normal mood and affect.   Behavior appears normal.     Lab Results   Component Value Date/Time    Cholesterol, total 167 06/20/2017 04:01 PM    HDL Cholesterol 58 06/20/2017 04:01 PM    LDL, calculated 95 06/20/2017 04:01 PM    Triglyceride 68 06/20/2017 04:01 PM    CHOL/HDL Ratio 4.2 04/20/2009 09:08 AM     Lab Results   Component Value Date/Time    Sodium 146 06/20/2017 04:01 PM    Potassium 4.1 06/20/2017 04:01 PM    Chloride 105 06/20/2017 04:01 PM    CO2 25 06/20/2017 04:01 PM    Anion gap 6 10/26/2009 10:19 AM    Glucose 98 06/20/2017 04:01 PM    BUN 30 06/20/2017 04:01 PM    Creatinine 0.80 06/20/2017 04:01 PM    BUN/Creatinine ratio 38 06/20/2017 04:01 PM    GFR est AA 89 06/20/2017 04:01 PM    GFR est non-AA 77 06/20/2017 04:01 PM    Calcium 9.1 06/20/2017 04:01 PM      Wt Readings from Last 3 Encounters:   07/12/17 177 lb (80.3 kg)   06/20/17 181 lb (82.1 kg)   09/21/16 177 lb 3.2 oz (80.4 kg)    BP Readings from Last 3 Encounters:   07/12/17 124/64   06/20/17 139/78   09/21/16 138/60        Current Outpatient Prescriptions   Medication Sig    cloNIDine HCl (CATAPRES) 0.1 mg tablet Take 1 Tab by mouth two (2) times a day.  amLODIPine (NORVASC) 10 mg tablet Take 1 Tab by mouth daily.  digoxin (LANOXIN) 0.125 mg tablet TAKE ONE TABLET BY MOUTH EVERY DAY    FLUoxetine (PROZAC) 20 mg capsule Take 1 Cap by mouth daily.  lidocaine (LIDODERM) 5 % Apply patch to the affected area for 12 hours a day and remove for 12 hours a day.  betamethasone valerate (VALISONE) 0.1 % topical cream APPLY TO AFFECTED AREA 2 TIMES A DAY    magnesium oxide (MAG-OX) 400 mg tablet Take 400 mg by mouth daily.  glucosamine-chondroitin (ARTHX) 500-400 mg cap Take 1 Cap by mouth daily.  Blood-Glucose Meter (BLOOD GLUCOSE MONITORING) monitoring kit Check BS daily    cholecalciferol, vitamin d3, (VITAMIN D) 1,000 unit tablet Take 1,000 Units by mouth daily.  PV W-O MAGDIEL/FERROUS FUMARATE/FA (M-VIT PO) Take 1 Tab by mouth daily.  ASCORBIC ACID (VITAMIN C PO) Take 1 Tab by mouth daily.     VITAMIN E ACETATE (VITAMIN E PO) Take 1 Tab by mouth daily. No current facility-administered medications for this visit. Past Surgical History:   Procedure Laterality Date    CARDIAC CATHETERIZATION      HX APPENDECTOMY      HX GI  2006    lap band    HX HYSTERECTOMY  1995      Social Hx=  reports that she has never smoked. She has never used smokeless tobacco. She reports that she does not drink alcohol or use illicit drugs. Family Hx= family history includes Alcohol abuse in her paternal grandfather; Arthritis-rheumatoid in her father; Diabetes in her mother; Heart Disease in her mother; Hypertension in her father and mother; Stroke in her father and mother. There is no history of Asthma, Bleeding Prob, Cancer, Elevated Lipids, Headache, Lung Disease, Migraines, Psychiatric Disorder, or Mental Retardation. Impression see above.

## 2017-07-26 ENCOUNTER — CLINICAL SUPPORT (OUTPATIENT)
Dept: CARDIOLOGY CLINIC | Age: 67
End: 2017-07-26

## 2017-07-26 DIAGNOSIS — I10 ESSENTIAL HYPERTENSION: ICD-10-CM

## 2017-07-26 DIAGNOSIS — E78.00 HYPERCHOLESTEROLEMIA: ICD-10-CM

## 2017-07-26 DIAGNOSIS — I48.0 PAF (PAROXYSMAL ATRIAL FIBRILLATION) (HCC): Primary | ICD-10-CM

## 2017-07-31 ENCOUNTER — TELEPHONE (OUTPATIENT)
Dept: CARDIOLOGY CLINIC | Age: 67
End: 2017-07-31

## 2017-07-31 NOTE — PROGRESS NOTES
Heart muscle is strong  Valves fine except moderate TR , we will keep watch  12/20/2017 8:50 AM    Reji Vann [facility]      [facility]         DANILO SCHED  7/11/2018  10:40 AM   Amber Foss MD        [facility] SCHED   No changes in meds needed

## 2017-07-31 NOTE — TELEPHONE ENCOUNTER
Pt would like a call regarding her recent Echo test.  She can be reached at [phone].     Thank you,  Jane Post

## 2017-12-01 RX ORDER — BETAMETHASONE VALERATE 1.2 MG/G
CREAM TOPICAL
Qty: 45 G | Refills: 2 | Status: SHIPPED | OUTPATIENT
Start: 2017-12-01 | End: 2019-01-16

## 2019-01-10 ENCOUNTER — OFFICE VISIT (OUTPATIENT)
Dept: FAMILY MEDICINE CLINIC | Age: 69
End: 2019-01-10

## 2019-01-10 VITALS
OXYGEN SATURATION: 96 % | DIASTOLIC BLOOD PRESSURE: 83 MMHG | RESPIRATION RATE: 18 BRPM | HEIGHT: 68 IN | WEIGHT: 175 LBS | BODY MASS INDEX: 26.52 KG/M2 | HEART RATE: 81 BPM | SYSTOLIC BLOOD PRESSURE: 197 MMHG | TEMPERATURE: 98.8 F

## 2019-01-10 DIAGNOSIS — F33.41 RECURRENT MAJOR DEPRESSIVE DISORDER, IN PARTIAL REMISSION (HCC): ICD-10-CM

## 2019-01-10 DIAGNOSIS — I48.0 PAF (PAROXYSMAL ATRIAL FIBRILLATION) (HCC): ICD-10-CM

## 2019-01-10 DIAGNOSIS — I10 ESSENTIAL HYPERTENSION: Primary | ICD-10-CM

## 2019-01-10 PROBLEM — F32.A DEPRESSION: Chronic | Status: ACTIVE | Noted: 2019-01-10

## 2019-01-10 RX ORDER — CLONIDINE HYDROCHLORIDE 0.1 MG/1
0.1 TABLET ORAL 2 TIMES DAILY
Qty: 60 TAB | Refills: 5 | Status: SHIPPED | OUTPATIENT
Start: 2019-01-10 | End: 2019-01-24 | Stop reason: SDUPTHER

## 2019-01-10 RX ORDER — DIGOXIN 125 MCG
TABLET ORAL
Qty: 90 TAB | Refills: 3 | Status: SHIPPED | OUTPATIENT
Start: 2019-01-10 | End: 2019-05-21 | Stop reason: SDUPTHER

## 2019-01-10 RX ORDER — FLUOXETINE 10 MG/1
10 CAPSULE ORAL DAILY
Qty: 30 CAP | Refills: 0 | Status: SHIPPED | OUTPATIENT
Start: 2019-01-10 | End: 2019-01-24 | Stop reason: SDUPTHER

## 2019-01-10 NOTE — PROGRESS NOTES
[facility]  
Outpatient Resident Progress Note Encounter Date: 1/10/2019 Chief Complaint Patient presents with  Medication Refill  Medication Evaluation Would like to discuss decreasing Prozac dose History of Present Illness Patient is a 76 y.o. female, who presents to clinic for Medication Refill and Medication Evaluation (Would like to discuss decreasing Prozac dose) Patient reports home  to 220 and  to 120. Reports being out of her medication for about 2 weeks. Denies sweats, fever, fatigue, dizziness, lightheadedness, headaches, changes in vision, CP, SOB, abdominal pain or tenderness, nausea, vomiting, dysuria, hematuria, diarrhea, melena, hematochezia, leg swelling, or any other complains at this moment. Patient also requests reducing Prozac dose. Review of Systems A complete ROS was reviewed and only pertinent items documented on HPI. Allergies - reviewed: Allergies Allergen Reactions  Crestor [Rosuvastatin] Myalgia  Lipitor [Atorvastatin] Myalgia  Sulfa (Sulfonamide Antibiotics) Hives  Tetanus Vaccines And Toxoid Other (comments) Local reaction--arm red and swollen Medications - reviewed:  
Current Outpatient Medications Medication Sig  cloNIDine HCl (CATAPRES) 0.1 mg tablet Take 1 Tab by mouth two (2) times a day.  digoxin (LANOXIN) 0.125 mg tablet TAKE ONE TABLET BY MOUTH EVERY DAY  FLUoxetine (PROZAC) 10 mg capsule Take 1 Cap by mouth daily.  magnesium oxide (MAG-OX) 400 mg tablet Take 400 mg by mouth daily.  Blood-Glucose Meter (BLOOD GLUCOSE MONITORING) monitoring kit Check BS daily  PV W-O MAGDIEL/FERROUS FUMARATE/FA (M-VIT PO) Take 1 Tab by mouth daily.  betamethasone valerate (VALISONE) 0.1 % topical cream APPLY TO AFFECTED AREA 2 TIMES A DAY  amLODIPine (NORVASC) 10 mg tablet Take 1 Tab by mouth daily.  lidocaine (LIDODERM) 5 % Apply patch to the affected area for 12 hours a day and remove for 12 hours a day.  glucosamine-chondroitin (ARTHX) 500-400 mg cap Take 1 Cap by mouth daily.  cholecalciferol, vitamin d3, (VITAMIN D) 1,000 unit tablet Take 1,000 Units by mouth daily.  ASCORBIC ACID (VITAMIN C PO) Take 1 Tab by mouth daily.  VITAMIN E ACETATE (VITAMIN E PO) Take 1 Tab by mouth daily. No current facility-administered medications for this visit. Past Medical History - reviewed: 
Past Medical History:  
Diagnosis Date  Arthritis  Depression  Diabetes ([facility] [address]  Hypercholesterolemia  Hypertension  PAF (paroxysmal atrial fibrillation) ([facility] [address]  Sleep apnea  Status post gastric banding Family Medical History - reviewed: 
Family History Problem Relation Age of Onset  Diabetes Mother  Hypertension Mother  Heart Disease Mother  Stroke Mother  Hypertension Father  Stroke Father  Arthritis-rheumatoid Father  Alcohol abuse Paternal Grandfather  Asthma Neg Hx  Bleeding Prob Neg Hx  Cancer Neg Hx  Elevated Lipids Neg Hx   
 Headache Neg Hx  Lung Disease Neg Hx  Migraines Neg Hx  Psychiatric Disorder Neg Hx  Mental Retardation Neg Hx Objective Visit Vitals /83 (BP 1 Location: Left arm, BP Patient Position: Sitting) Pulse 81 Temp 98.8 °F (37.1 °C) (Oral) Resp 18 Ht 5' 8\" (1.727 m) Wt 175 lb (79.4 kg) SpO2 96% BMI 26.61 kg/m² Body mass index is 26.61 kg/m². Nursing note and vitals reviewed. The following vital sign(s) noted to be abnormal: Elevated BP, higher than goal BP < 140/90. Physical Exam 
Constitutional: Well-developed, well-nourished, and in no distress. Cardiovascular: Normal rate, regular rhythm, normal heart sounds and intact distal pulses. Exam reveals no gallop and no friction rub. No murmur heard. Pulmonary/Chest: Effort normal and breath sounds normal. No respiratory distress. No wheezes, no rales, no tenderness. Abdominal: Soft. Bowel sounds are normal. No distension and no mass. There is no tenderness. There is no rebound and no guarding. Musculoskeletal: Normal range of motion. Exhibits no edema, tenderness or deformity. Neurological: Alert and oriented to person, place, and time. Normal reflexes. No cranial nerve deficit. Gait normal. Coordination normal.  
Skin: Skin is warm and dry. No rash noted. Not diaphoretic. No erythema. No pallor. Psychiatric: Mood, memory, affect and judgment normal.  
 
Assessment / Plan Ms. Shadi Banda is a 76 y.o. female with the following medical condition(s): 1. Essential hypertension Elevated BP in the office was discussed with patient, responded well to Clonidine 0.2 mg PO. Pt ran out of medication about 2 weeks ago. Importance of medication adherence was discussed with the patient and she reported to understand the information provided. A log was provided and the patient was instructed on taking measurements right after waking up in the morning and just before bedtime. These results will be evaluated during next visit that was recommended to be within the next 2 weeks. Patient was counseled about controlling sodium intake. No adjustments to medications during this encounter.   
- BP goal for this patient is < 140/90 mmHg - Follow up in 1 week(s) for elevated BP reevaluation. - cloNIDine HCl (CATAPRES) 0.1 mg tablet; Take 1 Tab by mouth two (2) times a day. Dispense: 60 Tab; Refill: 5 
- LIPID PANEL 
- CBC W/O DIFF 
- METABOLIC PANEL, COMPREHENSIVE 
- TSH 3RD GENERATION 2. PAF (paroxysmal atrial fibrillation) (HCC) 
- digoxin (LANOXIN) 0.125 mg tablet; TAKE ONE TABLET BY MOUTH EVERY DAY  Dispense: 90 Tab; Refill: 3 3. Recurrent major depressive disorder, in partial remission ([address] Patient requested to decrease her dose from 20 mg to 10 mg.  It was discussed with her the risks of continuing this medication but she wants to continue using it as it was prescribed by a Psychiatrist years ago for depression. She is agreeable to try to go down on the medication with aims of either stop it or replace it with a safer SSRI for her age. - FLUoxetine (PROZAC) 10 mg capsule; Take 1 Cap by mouth daily. Dispense: 30 Cap; Refill: 0 Patient was advised to return to clinic in case of worsening of symptoms or fail to improve. Life threatening signs and symptoms were discussed and patient advised to go to the nearest ED for prompt evaluation and care in case of onset of any of these. Follow-up Disposition: 
Return in about 1 week (around 1/17/2019) for Follow up Chronic Conditions. · I have discussed the diagnosis with the patient and the intended plan as seen in the above orders. The patient has received an after-visit summary and questions were answered concerning future plans. I have discussed medication side effects and warnings with the patient as well. Patient/Plan discussed with Dr. Catalina Campos (Attending Physician) Arjun Burrows MD 
PGY-3 Family Medicine Resident Encounter Date: 1/10/2019

## 2019-01-10 NOTE — PROGRESS NOTES
Per verbal order from Dr. Rachael Vásquez, 0.2 mg of clonidine given now at 1350 for high blood pressure

## 2019-01-10 NOTE — PATIENT INSTRUCTIONS
High Blood Pressure: Care Instructions Your Care Instructions If your blood pressure is usually above 130/80, you have high blood pressure, or hypertension. That means the top number is 130 or higher or the bottom number is 80 or higher, or both. Despite what a lot of people think, high blood pressure usually doesn't cause headaches or make you feel dizzy or lightheaded. It usually has no symptoms. But it does increase your risk for heart attack, stroke, and kidney or eye damage. The higher your blood pressure, the more your risk increases. Your doctor will give you a goal for your blood pressure. Your goal will be based on your health and your age. Lifestyle changes, such as eating healthy and being active, are always important to help lower blood pressure. You might also take medicine to reach your blood pressure goal. 
Follow-up care is a key part of your treatment and safety. Be sure to make and go to all appointments, and call your doctor if you are having problems. It's also a good idea to know your test results and keep a list of the medicines you take. How can you care for yourself at home? Medical treatment · If you stop taking your medicine, your blood pressure will go back up. You may take one or more types of medicine to lower your blood pressure. Be safe with medicines. Take your medicine exactly as prescribed. Call your doctor if you think you are having a problem with your medicine. · Talk to your doctor before you start taking aspirin every day. Aspirin can help certain people lower their risk of a heart attack or stroke. But taking aspirin isn't right for everyone, because it can cause serious bleeding. · See your doctor regularly. You may need to see the doctor more often at first or until your blood pressure comes down. · If you are taking blood pressure medicine, talk to your doctor before you take decongestants or anti-inflammatory medicine, such as ibuprofen. Some of these medicines can raise blood pressure. · Learn how to check your blood pressure at home. Lifestyle changes · Stay at a healthy weight. This is especially important if you put on weight around the waist. Losing even 10 pounds can help you lower your blood pressure. · If your doctor recommends it, get more exercise. Walking is a good choice. Bit by bit, increase the amount you walk every day. Try for at least 30 minutes on most days of the week. You also may want to swim, bike, or do other activities. · Avoid or limit alcohol. Talk to your doctor about whether you can drink any alcohol. · Try to limit how much sodium you eat to less than 2,300 milligrams (mg) a day. Your doctor may ask you to try to eat less than 1,500 mg a day. · Eat plenty of fruits (such as bananas and oranges), vegetables, legumes, whole grains, and low-fat dairy products. · Lower the amount of saturated fat in your diet. Saturated fat is found in animal products such as milk, cheese, and meat. Limiting these foods may help you lose weight and also lower your risk for heart disease. · Do not smoke. Smoking increases your risk for heart attack and stroke. If you need help quitting, talk to your doctor about stop-smoking programs and medicines. These can increase your chances of quitting for good. When should you call for help? Call 911 anytime you think you may need emergency care. This may mean having symptoms that suggest that your blood pressure is causing a serious heart or blood vessel problem. Your blood pressure may be over 180/120. 
 For example, call 911 if: 
  · You have symptoms of a heart attack. These may include: 
? Chest pain or pressure, or a strange feeling in the chest. 
? Sweating. ? Shortness of breath. ? Nausea or vomiting. ? Pain, pressure, or a strange feeling in the back, neck, jaw, or upper belly or in one or both shoulders or arms. ? Lightheadedness or sudden weakness. ? A fast or irregular heartbeat.  
  · You have symptoms of a stroke. These may include: 
? Sudden numbness, tingling, weakness, or loss of movement in your face, arm, or leg, especially on only one side of your body. ? Sudden vision changes. ? Sudden trouble speaking. ? Sudden confusion or trouble understanding simple statements. ? Sudden problems with walking or balance. ? A sudden, severe headache that is different from past headaches.  
  · You have severe back or belly pain.  
 Do not wait until your blood pressure comes down on its own. Get help right away. 
 Call your doctor now or seek immediate care if: 
  · Your blood pressure is much higher than normal (such as 180/120 or higher), but you don't have symptoms.  
  · You think high blood pressure is causing symptoms, such as: 
? Severe headache. 
? Blurry vision.  
 Watch closely for changes in your health, and be sure to contact your doctor if: 
  · Your blood pressure measures higher than your doctor recommends at least 2 times. That means the top number is higher or the bottom number is higher, or both.  
  · You think you may be having side effects from your blood pressure medicine. Where can you learn more? Go to http://joseph-joel.info/. Enter I323 in the search box to learn more about \"High Blood Pressure: Care Instructions. \" Current as of: December 6, 2017 Content Version: 11.8 © 7114-6513 [employer]. Care instructions adapted under license by [employer] (which disclaims liability or warranty for this information). If you have questions about a medical condition or this instruction, always ask your healthcare professional. [place] [address] any warranty or liability for your use of this information.

## 2019-01-10 NOTE — PROGRESS NOTES
Chief Complaint Patient presents with  Medication Refill  Medication Evaluation Would like to discuss decreasing Prozac dose Body mass index is 26.61 kg/m². 1. Have you been to the ER, urgent care clinic since your last visit? Hospitalized since your last visit? No 
 
2. Have you seen or consulted any other health care providers outside of the [address] [place] [address] since your last visit? Include any pap smears or colon screening. No 
 
Reviewed record in preparation for visit and have necessary documentation Pt did not bring medication to office visit for review Information was given to pt on Advanced Directives, Living Will Information was given on Shingles Vaccine Opportunity was given for questions Goals that were addressed and/or need to be completed after this appointment include:  
 
Health Maintenance Due Topic Date Due  
 DTaP/Tdap/Td series (1 - Tdap) 11/06/1971  Shingrix Vaccine Age 50> (1 of 2) 11/06/2000  GLAUCOMA SCREENING Q2Y  11/06/2015  Bone Densitometry (Dexa) Screening  11/06/2015  Pneumococcal 65+ Low/Medium Risk (1 of 2 - PCV13) 11/06/2015  BREAST CANCER SCRN MAMMOGRAM  01/10/2016  
 FOOT EXAM Q1  09/21/2017  MICROALBUMIN Q1  09/22/2017  
 HEMOGLOBIN A1C Q6M  12/20/2017  LIPID PANEL Q1  06/20/2018  MEDICARE YEARLY EXAM  06/21/2018  Influenza Age 5 to Adult  08/01/2018

## 2019-01-12 LAB
ALBUMIN SERPL-MCNC: 3.9 G/DL (ref 3.6–4.8)
ALBUMIN/GLOB SERPL: 1.4 {RATIO} (ref 1.2–2.2)
ALP SERPL-CCNC: 75 IU/L (ref 39–117)
ALT SERPL-CCNC: 8 IU/L (ref 0–32)
AST SERPL-CCNC: 14 IU/L (ref 0–40)
BILIRUB SERPL-MCNC: 0.4 MG/DL (ref 0–1.2)
BUN SERPL-MCNC: 19 MG/DL (ref 8–27)
BUN/CREAT SERPL: 18 (ref 12–28)
CALCIUM SERPL-MCNC: 9.1 MG/DL (ref 8.7–10.3)
CHLORIDE SERPL-SCNC: 103 MMOL/L (ref 96–106)
CHOLEST SERPL-MCNC: 164 MG/DL (ref 100–199)
CO2 SERPL-SCNC: 30 MMOL/L (ref 20–29)
CREAT SERPL-MCNC: 1.03 MG/DL (ref 0.57–1)
ERYTHROCYTE [DISTWIDTH] IN BLOOD BY AUTOMATED COUNT: 14.2 % (ref 12.3–15.4)
GLOBULIN SER CALC-MCNC: 2.8 G/DL (ref 1.5–4.5)
GLUCOSE SERPL-MCNC: 89 MG/DL (ref 65–99)
HCT VFR BLD AUTO: 33.3 % (ref 34–46.6)
HDLC SERPL-MCNC: 58 MG/DL
HGB BLD-MCNC: 10.2 G/DL (ref 11.1–15.9)
LDLC SERPL CALC-MCNC: 97 MG/DL (ref 0–99)
MCH RBC QN AUTO: 24.6 PG (ref 26.6–33)
MCHC RBC AUTO-ENTMCNC: 30.6 G/DL (ref 31.5–35.7)
MCV RBC AUTO: 80 FL (ref 79–97)
PLATELET # BLD AUTO: 296 X10E3/UL (ref 150–379)
POTASSIUM SERPL-SCNC: 4.1 MMOL/L (ref 3.5–5.2)
PROT SERPL-MCNC: 6.7 G/DL (ref 6–8.5)
RBC # BLD AUTO: 4.14 X10E6/UL (ref 3.77–5.28)
SODIUM SERPL-SCNC: 144 MMOL/L (ref 134–144)
TRIGL SERPL-MCNC: 47 MG/DL (ref 0–149)
TSH SERPL DL<=0.005 MIU/L-ACNC: 1.44 UIU/ML (ref 0.45–4.5)
VLDLC SERPL CALC-MCNC: 9 MG/DL (ref 5–40)
WBC # BLD AUTO: 5.8 X10E3/UL (ref 3.4–10.8)

## 2019-01-16 PROBLEM — E11.21 TYPE 2 DIABETES WITH NEPHROPATHY (HCC): Status: ACTIVE | Noted: 2019-01-16

## 2019-01-17 ENCOUNTER — OFFICE VISIT (OUTPATIENT)
Dept: FAMILY MEDICINE CLINIC | Age: 69
End: 2019-01-17

## 2019-01-17 VITALS
WEIGHT: 173.6 LBS | OXYGEN SATURATION: 97 % | RESPIRATION RATE: 20 BRPM | SYSTOLIC BLOOD PRESSURE: 146 MMHG | HEIGHT: 68 IN | HEART RATE: 69 BPM | TEMPERATURE: 98.6 F | BODY MASS INDEX: 26.31 KG/M2 | DIASTOLIC BLOOD PRESSURE: 81 MMHG

## 2019-01-17 DIAGNOSIS — I10 ESSENTIAL HYPERTENSION: Primary | ICD-10-CM

## 2019-01-17 DIAGNOSIS — F51.01 PRIMARY INSOMNIA: ICD-10-CM

## 2019-01-17 RX ORDER — LISINOPRIL AND HYDROCHLOROTHIAZIDE 12.5; 2 MG/1; MG/1
1 TABLET ORAL DAILY
Qty: 14 TAB | Refills: 0 | Status: SHIPPED | OUTPATIENT
Start: 2019-01-17 | End: 2019-01-24 | Stop reason: SINTOL

## 2019-01-17 RX ORDER — MELATONIN 5 MG
5 CAPSULE ORAL
Qty: 90 CAP | Refills: 3 | Status: SHIPPED | OUTPATIENT
Start: 2019-01-17 | End: 2019-02-12

## 2019-01-17 NOTE — PROGRESS NOTES
I saw and evaluated the patient with the resident, performing the key elements of the exam and service. I discussed the findings, assessment and plan with the resident and agree with the resident's findings and plan as documented in the resident's note. Patient is well known to me. Erlinda Cabrera M.D.

## 2019-01-17 NOTE — PROGRESS NOTES
[facility]     Outpatient Resident Progress Note    Encounter Date: 1/17/2019    Chief Complaint   Patient presents with    Hypertension    Medication Evaluation       History of Present Illness    Patient is a 76 y.o. female, who presents to clinic for Hypertension and Medication Evaluation  Patient reports home  to 200 and DBP 80 to 110. Reports being compliant with medication and have no SE. Denies sweats, fever, fatigue, dizziness, lightheadedness, headaches, changes in vision, CP, SOB, abdominal pain or tenderness, nausea, vomiting, dysuria, hematuria, diarrhea, melena, hematochezia, leg swelling, or any other complains at this moment. She also reports feeling anxious, especially at night. This have been happening for some time now, and even after the adjustment of Prozac from 20 mg to 10 mg last week. She feels lest worn out after the adjustment of the medication, no changes in anxiety. Denies depression, ideations of hurting herself or others, rush of ideas, or any other concerns. Review of Systems    A complete ROS was reviewed and only pertinent items documented on HPI. Allergies - reviewed: Allergies   Allergen Reactions    Crestor [Rosuvastatin] Myalgia    Lipitor [Atorvastatin] Myalgia    Sulfa (Sulfonamide Antibiotics) Hives    Tetanus Vaccines And Toxoid Other (comments)     Local reaction--arm red and swollen         Medications - reviewed:   Current Outpatient Medications   Medication Sig    lisinopril-hydroCHLOROthiazide (PRINZIDE, ZESTORETIC) 20-12.5 mg per tablet Take 1 Tab by mouth daily.  melatonin 5 mg cap capsule Take 1 Cap by mouth nightly.  cloNIDine HCl (CATAPRES) 0.1 mg tablet Take 1 Tab by mouth two (2) times a day.  digoxin (LANOXIN) 0.125 mg tablet TAKE ONE TABLET BY MOUTH EVERY DAY    FLUoxetine (PROZAC) 10 mg capsule Take 1 Cap by mouth daily.     miscellaneous medical supply (BLOOD PRESSURE CUFF) misc Use to take blood pressure as recommended by MD    magnesium oxide (MAG-OX) 400 mg tablet Take 400 mg by mouth daily.  glucosamine-chondroitin (ARTHX) 500-400 mg cap Take 1 Cap by mouth daily.  Blood-Glucose Meter (BLOOD GLUCOSE MONITORING) monitoring kit Check BS daily    cholecalciferol, vitamin d3, (VITAMIN D) 1,000 unit tablet Take 1,000 Units by mouth daily.  PV W-O MAGDIEL/FERROUS FUMARATE/FA (M-VIT PO) Take 1 Tab by mouth daily.  ASCORBIC ACID (VITAMIN C PO) Take 1 Tab by mouth daily.  VITAMIN E ACETATE (VITAMIN E PO) Take 1 Tab by mouth daily. No current facility-administered medications for this visit. Past Medical History - reviewed:  Past Medical History:   Diagnosis Date    Arthritis     Depression     Diabetes ([facility] [address]     Hypercholesterolemia     Hypertension     PA[facility]oxysmal [facility] fibrillation) ([facility])     Sleep apnea     Status post gastric banding        Family Medical History - reviewed:  Family History   Problem Relation Age of Onset    Diabetes Mother     Hypertension Mother     Heart Disease Mother     Stroke Mother     Hypertension Father     Stroke Father     Arthritis-rheumatoid Father     Alcohol abuse Paternal Grandfather     Asthma Neg Hx     Bleeding Prob Neg Hx     Cancer Neg Hx     Elevated Lipids Neg Hx     Headache Neg Hx     Lung Disease Neg Hx     Migraines Neg Hx     Psychiatric Disorder Neg Hx     Mental Retardation Neg Hx        Objective  Visit Vitals  /81 (BP 1 Location: Left arm, BP Patient Position: Sitting)   Pulse 69   Temp 98.6 °F (37 °C) (Oral)   Resp 20   Ht 5' 8\" (1.727 m)   Wt 173 lb 9.6 oz (78.7 kg)   SpO2 97%   BMI 26.40 kg/m²     Body mass index is 26.4 kg/m². Nursing note and vitals reviewed. Physical Exam  Constitutional: Well-developed, well-nourished, and in no distress. Cardiovascular: Normal rate, regular rhythm, normal heart sounds and intact distal pulses.  Exam reveals no gallop and no friction rub. No murmur heard. Pulmonary/Chest: Effort normal and breath sounds normal. No respiratory distress. No wheezes, no rales, no tenderness. Neurological: Alert and oriented. No focal abnormalities. Skin: Skin is warm and dry. No rash noted. Not diaphoretic. No erythema. No pallor. Psychiatric: Mood, memory, affect and judgment normal.     Assessment / Plan   Ms. Michelle Osborne is a 76 y.o. female with the following medical condition(s):    1. Essential hypertension  Elevated BP in the office was discussed with patient. Pt reports to be compliant with current medications. A log was provided and the patient was instructed on taking measurements right after waking up in the morning and just before bedtime. These results will be evaluated during next visit that was recommended to be within the next 2 weeks. Patient was counseled about controlling sodium intake. Will switch Clonidine for combination of lisinopril and HCTZ. - BP goal for this patient is < 140/90 mmHg  - Follow up in 1 week(s) for elevated BP reevaluation.     - lisinopril-hydroCHLOROthiazide (PRINZIDE, ZESTORETIC) 20-12.5 mg per tablet; Take 1 Tab by mouth daily. Dispense: 14 Tab; Refill: 0    2. Primary insomnia  Discussed sleep hygiene techniques with patient and she agreed on establishing a fixed time to sleep. Will continue follow this through. Recommended using melatonin at this time. - melatonin 5 mg cap capsule; Take 1 Cap by mouth nightly. Dispense: 90 Cap; Refill: 3    Patient was advised to return to clinic in case of worsening of symptoms or fail to improve. Follow-up Disposition:  Return in about 1 week (around 1/24/2019), or if symptoms worsen or fail to improve, for Follow up Chronic Conditions. · I have discussed the diagnosis with the patient and the intended plan as seen in the above orders. The patient has received an after-visit summary and questions were answered concerning future plans.   I have discussed medication side effects and warnings with the patient as well.       Patient/Plan discussed with Dr. Dyan Grant (Attending Physician)      Rajeev Worthington MD  PGY-3 Family Medicine Resident  Encounter Date: 1/17/2019

## 2019-01-17 NOTE — PATIENT INSTRUCTIONS
High Blood Pressure: Care Instructions  Your Care Instructions  If your blood pressure is usually above 130/80, you have high blood pressure, or hypertension. That means the top number is 130 or higher or the bottom number is 80 or higher, or both. Despite what a lot of people think, high blood pressure usually doesn't cause headaches or make you feel dizzy or lightheaded. It usually has no symptoms. But it does increase your risk for heart attack, stroke, and kidney or eye damage. The higher your blood pressure, the more your risk increases. Your doctor will give you a goal for your blood pressure. Your goal will be based on your health and your age. Lifestyle changes, such as eating healthy and being active, are always important to help lower blood pressure. You might also take medicine to reach your blood pressure goal.  Follow-up care is a key part of your treatment and safety. Be sure to make and go to all appointments, and call your doctor if you are having problems. It's also a good idea to know your test results and keep a list of the medicines you take. How can you care for yourself at home? Medical treatment  · If you stop taking your medicine, your blood pressure will go back up. You may take one or more types of medicine to lower your blood pressure. Be safe with medicines. Take your medicine exactly as prescribed. Call your doctor if you think you are having a problem with your medicine. · Talk to your doctor before you start taking aspirin every day. Aspirin can help certain people lower their risk of a heart attack or stroke. But taking aspirin isn't right for everyone, because it can cause serious bleeding. · See your doctor regularly. You may need to see the doctor more often at first or until your blood pressure comes down. · If you are taking blood pressure medicine, talk to your doctor before you take decongestants or anti-inflammatory medicine, such as ibuprofen.  Some of these medicines can raise blood pressure. · Learn how to check your blood pressure at home. Lifestyle changes  · Stay at a healthy weight. This is especially important if you put on weight around the waist. Losing even 10 pounds can help you lower your blood pressure. · If your doctor recommends it, get more exercise. Walking is a good choice. Bit by bit, increase the amount you walk every day. Try for at least 30 minutes on most days of the week. You also may want to swim, bike, or do other activities. · Avoid or limit alcohol. Talk to your doctor about whether you can drink any alcohol. · Try to limit how much sodium you eat to less than 2,300 milligrams (mg) a day. Your doctor may ask you to try to eat less than 1,500 mg a day. · Eat plenty of fruits (such as bananas and oranges), vegetables, legumes, whole grains, and low-fat dairy products. · Lower the amount of saturated fat in your diet. Saturated fat is found in animal products such as milk, cheese, and meat. Limiting these foods may help you lose weight and also lower your risk for heart disease. · Do not smoke. Smoking increases your risk for heart attack and stroke. If you need help quitting, talk to your doctor about stop-smoking programs and medicines. These can increase your chances of quitting for good. When should you call for help? Call 911 anytime you think you may need emergency care. This may mean having symptoms that suggest that your blood pressure is causing a serious heart or blood vessel problem. Your blood pressure may be over 180/120.   For example, call 911 if:    · You have symptoms of a heart attack. These may include:  ? Chest pain or pressure, or a strange feeling in the chest.  ? Sweating. ? Shortness of breath. ? Nausea or vomiting. ? Pain, pressure, or a strange feeling in the back, neck, jaw, or upper belly or in one or both shoulders or arms. ? Lightheadedness or sudden weakness.   ? A fast or irregular heartbeat.     · You have symptoms of a stroke. These may include:  ? Sudden numbness, tingling, weakness, or loss of movement in your face, arm, or leg, especially on only one side of your body. ? Sudden vision changes. ? Sudden trouble speaking. ? Sudden confusion or trouble understanding simple statements. ? Sudden problems with walking or balance. ? A sudden, severe headache that is different from past headaches.     · You have severe back or belly pain.    Do not wait until your blood pressure comes down on its own. Get help right away.   Call your doctor now or seek immediate care if:    · Your blood pressure is much higher than normal (such as 180/120 or higher), but you don't have symptoms.     · You think high blood pressure is causing symptoms, such as:  ? Severe headache.  ? Blurry vision.    Watch closely for changes in your health, and be sure to contact your doctor if:    · Your blood pressure measures higher than your doctor recommends at least 2 times. That means the top number is higher or the bottom number is higher, or both.     · You think you may be having side effects from your blood pressure medicine. Where can you learn more? Go to http://joseph-joel.info/. Enter F724 in the search box to learn more about \"High Blood Pressure: Care Instructions. \"  Current as of: December 6, 2017  Content Version: 11.8  © 5392-5276 [employer]. Care instructions adapted under license by [employer] (which disclaims liability or warranty for this information). If you have questions about a medical condition or this instruction, always ask your healthcare professional. [place] [address] any warranty or liability for your use of this information. Insomnia: Care Instructions  Your Care Instructions  Insomnia is the inability to sleep well. It is a common problem for most people at some time.  Insomnia may make it hard for you to get to sleep, stay asleep, or sleep as long as you need to. This can make you tired and grouchy during the day. It can also make you forgetful, less effective at work, and unhappy. Insomnia can be caused by conditions such as depression or anxiety. Pain can also affect your ability to sleep. When these problems are solved, the insomnia usually clears up. But sometimes bad sleep habits can cause insomnia. If insomnia is affecting your work or your enjoyment of life, you can take steps to improve your sleep. Follow-up care is a key part of your treatment and safety. Be sure to make and go to all appointments, and call your doctor if you are having problems. It's also a good idea to know your test results and keep a list of the medicines you take. How can you care for yourself at home? What to avoid  · Do not have drinks with caffeine, such as coffee or black tea, for 8 hours before bed. · Do not smoke or use other types of tobacco near bedtime. Nicotine is a stimulant and can keep you awake. · Avoid drinking alcohol late in the evening, because it can cause you to wake in the middle of the night. · Do not eat a big meal close to bedtime. If you are hungry, eat a light snack. · Do not drink a lot of water close to bedtime, because the need to urinate may wake you up during the night. · Do not read or watch TV in bed. Use the bed only for sleeping and sexual activity. What to try  · Go to bed at the same time every night, and wake up at the same time every morning. Do not take naps during the day. · Keep your bedroom quiet, dark, and cool. · Sleep on a comfortable pillow and mattress. · If watching the clock makes you anxious, turn it facing away from you so you cannot see the time. · If you worry when you lie down, start a worry book. Well before bedtime, write down your worries, and then set the book and your concerns aside. · Try meditation or other relaxation techniques before you go to bed.   · If you cannot fall asleep, get up and go to another room until you feel sleepy. Do something relaxing. Repeat your bedtime routine before you go to bed again. · Make your house quiet and calm about an hour before bedtime. Turn down the lights, turn off the TV, log off the computer, and turn down the volume on music. This can help you relax after a busy day. When should you call for help? Watch closely for changes in your health, and be sure to contact your doctor if:    · Your efforts to improve your sleep do not work.     · Your insomnia gets worse.     · You have been feeling down, depressed, or hopeless or have lost interest in things that you usually enjoy. Where can you learn more? Go to http://joseph-joel.info/. Enter P513 in the search box to learn more about \"Insomnia: Care Instructions. \"  Current as of: June 29, 2018  Content Version: 11.8  © 5757-4927 [employer]. Care instructions adapted under license by [employer] (which disclaims liability or warranty for this information). If you have questions about a medical condition or this instruction, always ask your healthcare professional. [address] any warranty or liability for your use of this information.

## 2019-01-17 NOTE — PROGRESS NOTES
Chief Complaint   Patient presents with    Hypertension    Medication Evaluation     Body mass index is 26.4 kg/m². 1. Have you been to the ER, urgent care clinic since your last visit? Hospitalized since your last visit? No    2. Have you seen or consulted any other health care providers outside of the [address] [place] [address] since your last visit? Include any pap smears or colon screening.  No    Reviewed record in preparation for visit and have necessary documentation  Pt did not bring medication to office visit for review  Information was given to pt on Advanced Directives, Living Will  Information was given on Shingles Vaccine  Opportunity was given for questions  Goals that were addressed and/or need to be completed after this appointment include:     Health Maintenance Due   Topic Date Due    Shingrix Vaccine Age 49> (1 of 2) 11/06/2000    GLAUCOMA SCREENING Q2Y  11/06/2015    Bone Densitometry (Dexa) Screening  11/06/2015    Pneumococcal 65+ Low/Medium Risk (1 of 2 - PCV13) 11/06/2015    BREAST CANCER SCRN MAMMOGRAM  01/10/2016    FOOT EXAM Q1  09/21/2017    MICROALBUMIN Q1  09/22/2017    HEMOGLOBIN A1C Q6M  12/20/2017    MEDICARE YEARLY EXAM  06/21/2018     Patient refused mammogram and colonoscopy    Our Blood Pressure machine: 146/81  Her Blood Pressure machine: 161/99

## 2019-01-18 ENCOUNTER — TELEPHONE (OUTPATIENT)
Dept: FAMILY MEDICINE CLINIC | Age: 69
End: 2019-01-18

## 2019-01-18 DIAGNOSIS — D51.9 ANEMIA DUE TO VITAMIN B12 DEFICIENCY, UNSPECIFIED B12 DEFICIENCY TYPE: ICD-10-CM

## 2019-01-18 DIAGNOSIS — D64.9 ANEMIA, UNSPECIFIED TYPE: Primary | ICD-10-CM

## 2019-01-18 DIAGNOSIS — D64.9 ANEMIA, UNSPECIFIED TYPE: ICD-10-CM

## 2019-01-18 NOTE — PROGRESS NOTES
Results discussed with patient by phone encounter. Evidence of anemia of unknown cause at this moment. Will work up and discuss during next week follow up appointment. Denise Brooks MD 
PGY-3 Family Medicine Resident

## 2019-01-18 NOTE — TELEPHONE ENCOUNTER
Called patient and discussed lab results. There is evidence of anemia of unclear cause at this time. Will work up for possible causes. Ordered iron profile, ferritin, Vit B12, folate, LDH, haptoglobin levels. Patient reports that mother has a Hx of pernicious anemia. Patient agrees with work up plan and will have labs done today. Will follow up results and discuss with patient during next appointment.      Orders Placed This Encounter    IRON PROFILE     Standing Status:   Future     Standing Expiration Date:   4/18/2019    FERRITIN     Standing Status:   Future     Standing Expiration Date:   4/18/2019    LD     Standing Status:   Future     Standing Expiration Date:   4/18/2019    HAPTOGLOBIN     Standing Status:   Future     Standing Expiration Date:   4/18/2019    VITAMIN B12 & FOLATE     Standing Status:   Future     Standing Expiration Date:   4/18/2019     Monique Lane MD  PGY-3 Family Medicine Resident

## 2019-01-19 LAB
FERRITIN SERPL-MCNC: 26 NG/ML (ref 15–150)
FOLATE SERPL-MCNC: >20 NG/ML
HAPTOGLOB SERPL-MCNC: 74 MG/DL (ref 34–200)
IRON SATN MFR SERPL: 4 % (ref 15–55)
IRON SERPL-MCNC: 17 UG/DL (ref 27–139)
LDH SERPL-CCNC: 157 IU/L (ref 119–226)
TIBC SERPL-MCNC: 445 UG/DL (ref 250–450)
UIBC SERPL-MCNC: 428 UG/DL (ref 118–369)
VIT B12 SERPL-MCNC: 684 PG/ML (ref 232–1245)

## 2019-01-24 ENCOUNTER — OFFICE VISIT (OUTPATIENT)
Dept: FAMILY MEDICINE CLINIC | Age: 69
End: 2019-01-24

## 2019-01-24 VITALS
TEMPERATURE: 98.4 F | SYSTOLIC BLOOD PRESSURE: 118 MMHG | WEIGHT: 175.2 LBS | DIASTOLIC BLOOD PRESSURE: 61 MMHG | BODY MASS INDEX: 26.55 KG/M2 | RESPIRATION RATE: 20 BRPM | HEART RATE: 66 BPM | OXYGEN SATURATION: 97 % | HEIGHT: 68 IN

## 2019-01-24 DIAGNOSIS — F33.41 RECURRENT MAJOR DEPRESSIVE DISORDER, IN PARTIAL REMISSION (HCC): ICD-10-CM

## 2019-01-24 DIAGNOSIS — I10 ESSENTIAL HYPERTENSION: Primary | ICD-10-CM

## 2019-01-24 DIAGNOSIS — D50.9 IRON DEFICIENCY ANEMIA, UNSPECIFIED IRON DEFICIENCY ANEMIA TYPE: ICD-10-CM

## 2019-01-24 RX ORDER — DOCUSATE SODIUM 100 MG/1
100 CAPSULE, LIQUID FILLED ORAL
Qty: 90 CAP | Refills: 5 | Status: SHIPPED | OUTPATIENT
Start: 2019-01-24 | End: 2019-05-21

## 2019-01-24 RX ORDER — AMLODIPINE BESYLATE 10 MG/1
10 TABLET ORAL DAILY
Qty: 90 TAB | Refills: 2 | Status: SHIPPED | OUTPATIENT
Start: 2019-01-24 | End: 2019-05-21 | Stop reason: SDUPTHER

## 2019-01-24 RX ORDER — FLUOXETINE 10 MG/1
10 CAPSULE ORAL DAILY
Qty: 90 CAP | Refills: 0 | Status: SHIPPED | OUTPATIENT
Start: 2019-01-24 | End: 2019-04-10 | Stop reason: SDUPTHER

## 2019-01-24 RX ORDER — LANOLIN ALCOHOL/MO/W.PET/CERES
325 CREAM (GRAM) TOPICAL
Qty: 90 TAB | Refills: 5 | Status: SHIPPED | OUTPATIENT
Start: 2019-01-24 | End: 2021-08-10 | Stop reason: SDUPTHER

## 2019-01-24 RX ORDER — CLONIDINE HYDROCHLORIDE 0.1 MG/1
0.1 TABLET ORAL 2 TIMES DAILY
Qty: 180 TAB | Refills: 2 | Status: SHIPPED | OUTPATIENT
Start: 2019-01-24 | End: 2019-05-21 | Stop reason: SDUPTHER

## 2019-01-24 NOTE — PROGRESS NOTES
I saw and evaluated the patient with the resident, performing the key elements of the exam and service. I discussed the findings, assessment and plan with the resident and agree with the resident's findings and plan as documented in the resident's note. Gordo Cash M.D.

## 2019-01-24 NOTE — PROGRESS NOTES
[facility]  
Outpatient Resident Progress Note Encounter Date: 1/24/2019 Chief Complaint Patient presents with  Hypertension 1 week follow up History of Present Illness Patient is a 76 y.o. female, who presents to clinic for Hypertension (1 week follow up ) Patient reports home  to 200 and DBP 70 to 100. Reports that she only took 1 dose of the Rx Lisinopril/HCTZ and had to discontinue it due to dizziness. 2 days ago she started to take an old Rx of Amlodipine 10 mg that she found. BP have been better since then and denies any SE. Pt requests to start on this medication instead of the Lisinopril/HCTZ. Denies sweats, fever, fatigue, dizziness, lightheadedness, headaches, changes in vision, CP, SOB, abdominal pain or tenderness, nausea, vomiting, dysuria, hematuria, diarrhea, melena, hematochezia, leg swelling, or any other complains at this moment. Lab work showed iron deficiency anemia. Patient denies changes in stool, including diarrhea, constipation, dark stools, or blood in stools. Review of Systems A complete ROS was reviewed and only pertinent items documented on HPI. Allergies - reviewed: Allergies Allergen Reactions  Crestor [Rosuvastatin] Myalgia  Lipitor [Atorvastatin] Myalgia  Sulfa (Sulfonamide Antibiotics) Hives  Tetanus Vaccines And Toxoid Other (comments) Local reaction--arm red and swollen Medications - reviewed:  
Current Outpatient Medications Medication Sig  
 amLODIPine (NORVASC) 10 mg tablet Take 1 Tab by mouth daily.  ferrous sulfate 325 mg (65 mg iron) tablet Take 1 Tab by mouth three (3) times daily (with meals).  docusate sodium (COLACE) 100 mg capsule Take 1 Cap by mouth three (3) times daily (with meals).  FLUoxetine (PROZAC) 10 mg capsule Take 1 Cap by mouth daily.   
 cloNIDine HCl (CATAPRES) 0.1 mg tablet Take 1 Tab by mouth two (2) times a day.  
 melatonin 5 mg cap capsule Take 1 Cap by mouth nightly.  digoxin (LANOXIN) 0.125 mg tablet TAKE ONE TABLET BY MOUTH EVERY DAY  miscellaneous medical supply (BLOOD PRESSURE CUFF) misc Use to take blood pressure as recommended by MD  
 magnesium oxide (MAG-OX) 400 mg tablet Take 400 mg by mouth daily.  glucosamine-chondroitin (ARTHX) 500-400 mg cap Take 1 Cap by mouth daily.  Blood-Glucose Meter (BLOOD GLUCOSE MONITORING) monitoring kit Check BS daily  cholecalciferol, vitamin d3, (VITAMIN D) 1,000 unit tablet Take 1,000 Units by mouth daily.  PV W-O MAGDIEL/FERROUS FUMARATE/FA (M-VIT PO) Take 1 Tab by mouth daily.  ASCORBIC ACID (VITAMIN C PO) Take 1 Tab by mouth daily.  VITAMIN E ACETATE (VITAMIN E PO) Take 1 Tab by mouth daily. No current facility-administered medications for this visit. Past Medical History - reviewed: 
Past Medical History:  
Diagnosis Date  Arthritis  Depression  Diabetes ([facility] [address]  Hypercholesterolemia  Hypertension  PAF (paroxysmal atrial fibrillation) ([facility] [address]  Sleep apnea  Status post gastric banding Family Medical History - reviewed: 
Family History Problem Relation Age of Onset  Diabetes Mother  Hypertension Mother  Heart Disease Mother  Stroke Mother  Hypertension Father  Stroke Father  Arthritis-rheumatoid Father  Alcohol abuse Paternal Grandfather  Asthma Neg Hx  Bleeding Prob Neg Hx  Cancer Neg Hx  Elevated Lipids Neg Hx   
 Headache Neg Hx  Lung Disease Neg Hx  Migraines Neg Hx  Psychiatric Disorder Neg Hx  Mental Retardation Neg Hx Objective Visit Vitals /61 (BP 1 Location: Left arm, BP Patient Position: Sitting) Pulse 66 Temp 98.4 °F (36.9 °C) (Oral) Resp 20 Ht 5' 8\" (1.727 m) Wt 175 lb 3.2 oz (79.5 kg) SpO2 97% BMI 26.64 kg/m² Body mass index is 26.64 kg/m². Nursing note and vitals reviewed.   
 
Physical Exam 
 Constitutional: Well-developed, well-nourished, and in no distress. Cardiovascular: Normal rate, regular rhythm, normal heart sounds and intact distal pulses. Exam reveals no gallop and no friction rub. No murmur heard. Pulmonary/Chest: Effort normal and breath sounds normal. No respiratory distress. No wheezes, no rales, no tenderness. Abdominal: Soft. Bowel sounds are normal. No distension and no mass. There is no tenderness. There is no rebound and no guarding. Musculoskeletal: Normal range of motion. Exhibits no edema, tenderness or deformity. Neurological: Alert and oriented. No focal abnormalities. Skin: Skin is warm and dry. No rash noted. Not diaphoretic. No erythema. No pallor. Psychiatric: Mood, memory, affect and judgment normal.  
 
Assessment / Plan Ms. Benito Huizar is a 76 y.o. female with the following medical condition(s): 1. Essential hypertension Stable. Will continue Clonidine and Amlodipine as patient have been doing at home with good results. - amLODIPine (NORVASC) 10 mg tablet; Take 1 Tab by mouth daily. Dispense: 90 Tab; Refill: 2 
- cloNIDine HCl (CATAPRES) 0.1 mg tablet; Take 1 Tab by mouth two (2) times a day. Dispense: 180 Tab; Refill: 2 
 
2. Iron deficiency anemia, unspecified iron deficiency anemia type Will test for possible GI bleed. Will consider referral to GI if tests positive. Will start treatment with Iron supplementation and Colace to avoid constipation SE.  
- ferrous sulfate 325 mg (65 mg iron) tablet; Take 1 Tab by mouth three (3) times daily (with meals). Dispense: 90 Tab; Refill: 5 
- docusate sodium (COLACE) 100 mg capsule; Take 1 Cap by mouth three (3) times daily (with meals). Dispense: 90 Cap; Refill: 5 
- AMB POC FECAL BLOOD, OCCULT, QL 3 CARDS 3. Recurrent major depressive disorder, in partial remission ([address] Stable. Will continue current dose of Prozac. - FLUoxetine (PROZAC) 10 mg capsule; Take 1 Cap by mouth daily.   Dispense: 90 Cap; Refill: 0 Patient was advised to return to clinic in case of worsening of symptoms or fail to improve. Life threatening signs and symptoms were discussed and patient advised to go to the nearest ED for prompt evaluation and care in case of onset of any of these. Follow-up Disposition: 
Return in about 2 weeks (around 2/7/2019), or if symptoms worsen or fail to improve, for Follow up Chronic Conditions. · I have discussed the diagnosis with the patient and the intended plan as seen in the above orders. The patient has received an after-visit summary and questions were answered concerning future plans. I have discussed medication side effects and warnings with the patient as well. Patient/Plan discussed with Dr. [place] Rob (Attending Physician) Chema Burroughs MD 
PGY-3 Family Medicine Resident Encounter Date: 1/24/2019

## 2019-01-24 NOTE — PATIENT INSTRUCTIONS
Iron Deficiency Anemia: Care Instructions Your Care Instructions Anemia means that you do not have enough red blood cells. Red blood cells carry oxygen around your body. When you have anemia, it can make you pale, weak, and tired. Many things can cause anemia. The most common cause is loss of blood. This can happen if you have heavy menstrual periods. It can also happen if you have bleeding in your stomach or bowel. You can also get anemia if you don't have enough iron in your diet or if it's hard for your body to absorb iron. In some cases, pregnancy causes anemia. That's because a pregnant woman needs more iron. Your doctor may do more tests to find the cause of your anemia. If a disease or other health problem is causing it, your doctor will treat that problem. It's important to follow up with your doctor to make sure that your iron level returns to normal. 
Follow-up care is a key part of your treatment and safety. Be sure to make and go to all appointments, and call your doctor if you are having problems. It's also a good idea to know your test results and keep a list of the medicines you take. How can you care for yourself at home? · If your doctor recommended iron pills, take them as directed. ? Try to take the pills on an empty stomach. You can do this about 1 hour before or 2 hours after meals. But you may need to take iron with food to avoid an upset stomach. ? Do not take antacids or drink milk or anything with caffeine within 2 hours of when you take your iron. They can keep your body from absorbing the iron well. ? Vitamin C helps your body absorb iron. You may want to take iron pills with a glass of orange juice or some other food high in vitamin C. 
? Iron pills may cause stomach problems. These include heartburn, nausea, diarrhea, constipation, and cramps. It can help to drink plenty of fluids and include fruits, vegetables, and fiber in your diet. ? It's normal for iron pills to make your stool a greenish or grayish black. But internal bleeding can also cause dark stool. So it's important to tell your doctor about any color changes. ? Call your doctor if you think you are having a problem with your iron pills. Even after you start to feel better, it will take several months for your body to build up its supply of iron. ? If you miss a pill, don't take a double dose. ? Keep iron pills out of the reach of small children. Too much iron can be very dangerous. · Eat foods with a lot of iron. These include red meat, shellfish, poultry, and eggs. They also include beans, raisins, whole-grain bread, and leafy green vegetables. · Steam your vegetables. This is the best way to prepare them if you want to get as much iron as possible. · Be safe with medicines. Do not take nonsteroidal anti-inflammatory pain relievers unless your doctor tells you to. These include aspirin, naproxen (Aleve), and ibuprofen (Advil, Motrin). · Liquid iron can stain your teeth. But you can mix it with water or juice and drink it with a straw. Then it won't get on your teeth. When should you call for help? Call 911 anytime you think you may need emergency care. For example, call if: 
  · You passed out (lost consciousness).  
 Call your doctor now or seek immediate medical care if: 
  · You are short of breath.  
  · You are dizzy or light-headed, or you feel like you may faint.  
  · You have new or worse bleeding.  
 Watch closely for changes in your health, and be sure to contact your doctor if: 
  · You feel weaker or more tired than usual.  
  · You do not get better as expected. Where can you learn more? Go to http://joseph-joel.info/. Enter T298 in the search box to learn more about \"Iron Deficiency Anemia: Care Instructions. \" Current as of: May 6, 2018 Content Version: 11.9 © 0873-7746 [employer].  Care instructions adapted under license by [address] (which disclaims liability or warranty for this information). If you have questions about a medical condition or this instruction, always ask your healthcare professional. [address] any warranty or liability for your use of this information.

## 2019-01-24 NOTE — PROGRESS NOTES
1. Have you been to the ER, urgent care clinic since your last visit? Hospitalized since your last visit? No 
 
2. Have you seen or consulted any other health care providers outside of the [address] [place] [address] since your last visit? Include any pap smears or colon screening. No 
Reviewed record in preparation for visit and have necessary documentation Pt did not bring medication to office visit for review Information was given to pt on Advanced Directives, Living Will Information was given on Shingles Vaccine 
opportunity was given for questions Goals that were addressed and/or need to be completed during or after this appointment include Health Maintenance Due Topic Date Due  Shingrix Vaccine Age 50> (1 of 2) 11/06/2000  GLAUCOMA SCREENING Q2Y  11/06/2015  Bone Densitometry (Dexa) Screening  11/06/2015  Pneumococcal 65+ Low/Medium Risk (1 of 2 - PCV13) 11/06/2015  BREAST CANCER SCRN MAMMOGRAM  01/10/2016  
 FOOT EXAM Q1  09/21/2017  MICROALBUMIN Q1  09/22/2017  
 HEMOGLOBIN A1C Q6M  12/20/2017  MEDICARE YEARLY EXAM  06/21/2018

## 2019-01-25 NOTE — PROGRESS NOTES
Results discussed with patient during follow up appointment. Started on iron deficiency treatment.      Belinda Parsons MD  PGY-3 Family Medicine Resident

## 2019-02-12 ENCOUNTER — OFFICE VISIT (OUTPATIENT)
Dept: FAMILY MEDICINE CLINIC | Age: 69
End: 2019-02-12

## 2019-02-12 VITALS
HEIGHT: 68 IN | BODY MASS INDEX: 25.31 KG/M2 | RESPIRATION RATE: 20 BRPM | SYSTOLIC BLOOD PRESSURE: 123 MMHG | HEART RATE: 68 BPM | TEMPERATURE: 98.2 F | OXYGEN SATURATION: 92 % | DIASTOLIC BLOOD PRESSURE: 62 MMHG | WEIGHT: 167 LBS

## 2019-02-12 DIAGNOSIS — Z00.00 MEDICARE ANNUAL WELLNESS VISIT, SUBSEQUENT: Primary | ICD-10-CM

## 2019-02-12 DIAGNOSIS — Z12.39 BREAST CANCER SCREENING: ICD-10-CM

## 2019-02-12 DIAGNOSIS — K59.03 DRUG-INDUCED CONSTIPATION: ICD-10-CM

## 2019-02-12 DIAGNOSIS — I10 ESSENTIAL HYPERTENSION: ICD-10-CM

## 2019-02-12 DIAGNOSIS — D50.9 IRON DEFICIENCY ANEMIA, UNSPECIFIED IRON DEFICIENCY ANEMIA TYPE: ICD-10-CM

## 2019-02-12 DIAGNOSIS — F51.01 PRIMARY INSOMNIA: ICD-10-CM

## 2019-02-12 PROBLEM — F32.A CLINICAL DEPRESSION: Status: ACTIVE | Noted: 2019-01-10

## 2019-02-12 PROBLEM — T14.8XXA: Status: ACTIVE | Noted: 2019-02-12

## 2019-02-12 PROBLEM — I49.9 IRREGULAR HEART RHYTHM: Status: ACTIVE | Noted: 2019-02-12

## 2019-02-12 RX ORDER — POLYETHYLENE GLYCOL 3350 17 G/17G
17 POWDER, FOR SOLUTION ORAL DAILY
Qty: 850 G | Refills: 5 | Status: SHIPPED | OUTPATIENT
Start: 2019-02-12 | End: 2019-05-21

## 2019-02-12 RX ORDER — MELATONIN 5 MG
10 CAPSULE ORAL
Qty: 90 CAP | Refills: 3 | Status: SHIPPED | OUTPATIENT
Start: 2019-02-12 | End: 2019-05-21 | Stop reason: SDUPTHER

## 2019-02-12 NOTE — PROGRESS NOTES
1. Have you been to the ER, urgent care clinic since your last visit? Hospitalized since your last visit? No 
 
2. Have you seen or consulted any other health care providers outside of the [address] [place] [address] since your last visit? Include any pap smears or colon screening. No 
Reviewed record in preparation for visit and have necessary documentation Pt did not bring medication to office visit for review Goals that were addressed and/or need to be completed during or after this appointment include Health Maintenance Due Topic Date Due  Shingrix Vaccine Age 50> (1 of 2) 11/06/2000  Bone Densitometry (Dexa) Screening  11/06/2015  Pneumococcal 65+ Low/Medium Risk (1 of 2 - PCV13) 11/06/2015  BREAST CANCER SCRN MAMMOGRAM  01/10/2016  
 FOOT EXAM Q1  09/21/2017  MICROALBUMIN Q1  09/22/2017  
 HEMOGLOBIN A1C Q6M  12/20/2017  MEDICARE YEARLY EXAM  06/21/2018 Pt declines all vaccines Declines mammogram 
Declines bone densitometry

## 2019-02-12 NOTE — PROGRESS NOTES
[id number] [facility] Joselyn Tierney  
[phone] Date of visit: 2/12/2019 This is an Subsequent Medicare Annual Wellness Visit (AWV), (Performed more than 12 months after effective date of Medicare Part B enrollment and 12 months after last preventive visit, Once in a lifetime) I have reviewed the patient's medical history in detail and updated the computerized patient record. Steff Wolf is a 76 y.o. female History obtained from: the patient. Concerns today (Patient understands that medical problems addressed today may incur additional cost as this is a preventive visit) -Follow up HTN, Anemia, Insomnia History Patient Active Problem List  
Diagnosis Code  Hypertension I10  
 Hypercholesterolemia E78.00  
 Diabetes ([address] E11.9  Sleep apnea G47.30  PAF (paroxysmal atrial fibrillation) ([facility]) I48.0  Status post gastric banding Z98.84  
 Glucose intolerance (impaired glucose tolerance) R73.02  
 Clinical depression F32.9  Type 2 diabetes with nephropathy ([facility]) E11.21  
 Iron deficiency anemia D50.9  Fracture of unspecified bones T14. Henny Meiers  Irregular heart rhythm I49.9  Drug-induced constipation K59.03  
 Primary insomnia F51.01 Past Medical History:  
Diagnosis Date  Arthritis  Depression  Diabetes ([address]  Hypercholesterolemia  Hypertension  PAF (paroxysmal atrial fibrillation) ([address]  Sleep apnea  Status post gastric banding Past Surgical History:  
Procedure Laterality Date  CARDIAC CATHETERIZATION    
 HX APPENDECTOMY  HX GI  2006  
 lap band 2 [facility] Allergies Allergen Reactions  Statins-Hmg-Coa Reductase Inhibitors Other (comments)  Crestor [Rosuvastatin] Myalgia  Lipitor [Atorvastatin] Myalgia  Sulfa (Sulfonamide Antibiotics) Hives Other reaction(s): unknown  Tetanus Immune Globulin Unknown (comments)  Tetanus Vaccines And Toxoid Other (comments) Local reaction--arm red and swollen Current Outpatient Medications Medication Sig Dispense Refill  melatonin 5 mg cap capsule Take 2 Caps by mouth nightly. 90 Cap 3  polyethylene glycol (MIRALAX) 17 gram/dose powder Take 17 g by mouth daily. 850 g 5  
 amLODIPine (NORVASC) 10 mg tablet Take 1 Tab by mouth daily. 90 Tab 2  
 ferrous sulfate 325 mg (65 mg iron) tablet Take 1 Tab by mouth three (3) times daily (with meals). 90 Tab 5  
 docusate sodium (COLACE) 100 mg capsule Take 1 Cap by mouth three (3) times daily (with meals). 90 Cap 5  FLUoxetine (PROZAC) 10 mg capsule Take 1 Cap by mouth daily. 90 Cap 0  cloNIDine HCl (CATAPRES) 0.1 mg tablet Take 1 Tab by mouth two (2) times a day. 180 Tab 2  
 digoxin (LANOXIN) 0.125 mg tablet TAKE ONE TABLET BY MOUTH EVERY DAY 90 Tab 3  
 miscellaneous medical supply (BLOOD PRESSURE CUFF) misc Use to take blood pressure as recommended by MD 1 Each 0  
 magnesium oxide (MAG-OX) 400 mg tablet Take 400 mg by mouth daily.  glucosamine-chondroitin (ARTHX) 500-400 mg cap Take 1 Cap by mouth daily.  Blood-Glucose Meter (BLOOD GLUCOSE MONITORING) monitoring kit Check BS daily 1 Kit 0  
 cholecalciferol, vitamin d3, (VITAMIN D) 1,000 unit tablet Take 1,000 Units by mouth daily.  PV W-O MAGDIEL/FERROUS FUMARATE/FA (M-VIT PO) Take 1 Tab by mouth daily.  ASCORBIC ACID (VITAMIN C PO) Take 1 Tab by mouth daily.  VITAMIN E ACETATE (VITAMIN E PO) Take 1 Tab by mouth daily. Family History Problem Relation Age of Onset  Diabetes Mother  Hypertension Mother  Heart Disease Mother  Stroke Mother  Hypertension Father  Stroke Father  Arthritis-rheumatoid Father  Alcohol abuse Paternal Grandfather  Asthma Neg Hx  Bleeding Prob Neg Hx  Cancer Neg Hx  Elevated Lipids Neg Hx   
 Headache Neg Hx  Lung Disease Neg Hx  Migraines Neg Hx  Psychiatric Disorder Neg Hx  Mental Retardation Neg Hx Social History Tobacco Use  Smoking status: Never Smoker  Smokeless tobacco: Never Used Substance Use Topics  Alcohol use: No  
 
 
Specialists/Care Team  
The [employer] [facility] Norris has established care with the following healthcare providers: 
Patient Care Team: 
Santana Cardenas MD as PCP - General (Family Practice) Edwina Gee MD (Cardiology) Health Risk Assessment Demographics  
female 76 y.o. General Health Questions  
-During the past 4 weeks: 
 -how would you rate your health in general? Fair -how often have you been bothered by feeling dizzy when standing up? never -how much have you been bothered by bodily pain? mildly 
 -Have you noticed any hearing difficulties? no 
 -has your physical and emotional health limited your social activities with family or friends? no 
 
Emotional Health Questions  
-Do you have a history of depression, anxiety, or emotional problems? yes 
-Over the past 2 weeks, have you felt down, depressed or hopeless? no 
-Over the past 2 weeks, have you felt little interest or pleasure in doing things? no 
 
Health Habits Please describe your diet habits: Vegetables, fruits, chicken, potatoes, no fried or junk food Do you get 5 servings of fruits or vegetables daily? yes Do you exercise regularly? yes Activities of Daily Living and Functional Status  
-Do you need help with eating, walking, dressing, bathing, toileting, the phone, transportation, shopping, preparing meals, housework, laundry, medications or managing money? no 
-In the past four weeks, was someone available to help you if you needed and wanted help with anything? yes 
-Are you confident are you that you can control and manage most of your health problems? yes 
-Have you been given information to help you keep track of your medications? yes -How often do you have trouble taking your medications as prescribed? never Fall Risk and Home Safety Have you fallen 2 or more times in the past year? no 
Does your home have rugs in the hallway, lack grab bars in the bathroom, lack handrails on the stairs or have poor lighting? no 
Do you have smoke detectors and check them regularly? yes Do you have difficulties driving a car? no 
Do you always fasten your seat belt when you are in a car? yes Review of Systems (if indicated for problems addressed today) See attached progress note Physical Examination Vitals:  
 02/12/19 9348 BP: 123/62 Pulse: 68 Resp: 20 Temp: 98.2 °F (36.8 °C) TempSrc: Oral  
SpO2: 92% Weight: 167 lb (75.8 kg) Height: 5' 8\" (1.727 m) Body mass index is 25.39 kg/m². No exam data present Was the patient's timed Up & Go test unsteady or longer than 30 seconds? no 
 
Evaluation of Cognitive Function Mood/affect:  happy Orientation: Person, Place, Time and Situation Appearance: age appropriate, casually dressed and within normal Limits Family member/caregiver input: N/A Additional exam if indicated for problems addressed today: 
See attached progress note Advice/Referrals/Counseling (as indicated) Education and counseling provided for any problems identified above: Breast CA Screening, Shingles and Pneumococcal vaccine, and DEXA Scan. Preventive Services (Preventive care checklist to be included in patient instructions) Discussed today Done Previously Not Needed   
x   Pneumococcal vaccines  
 x  Flu vaccine  
 x  Hepatitis B vaccine (if at risk) x   Shingles vaccine  
 x  TDAP vaccine  
x   Mammogram  
 x  Pap smear  
 x  Colorectal cancer screening  
  x Low-dose CT for lung cancer screening  
x   Bone density test  
 x  Glaucoma screening  
 x  Cholesterol test  
 x  Diabetes screening test   
  x Diabetes self-management class x Nutritionist referral for diabetes or renal disease Discussion of Advance Directive Discussed with Tatyana Norris her ability to prepare and advance directive in the case that an injury or illness causes her to be unable to make health care decisions. Assessment/Plan  
Z00.00 ICD-10-CM ICD-9-CM 1. Medicare annual wellness visit, subsequent Z00.00 V70.0 TIM MAMMO BI SCREENING INCL CAD 2. Essential hypertension I10 401.9 MICROALBUMIN, UR, RAND W/ MICROALB/CREAT RATIO 3. Iron deficiency anemia, unspecified iron deficiency anemia type D50.9 280.9 CBC W/O DIFF 4. Primary insomnia F51.01 307.42 melatonin 5 mg cap capsule 5. Drug-induced constipation K59.03 564.09 polyethylene glycol (MIRALAX) 17 gram/dose powder  
  E980.5 6. Breast cancer screening Z12.31 V76.10 [facility] MAMMO BI SCREENING INCL CAD Orders Placed This Encounter  TIM MAMMO BI SCREENING INCL CAD  MICROALBUMIN, UR, RAND W/ MICROALB/CREAT RATIO  CBC W/O DIFF  melatonin 5 mg cap capsule  polyethylene glycol (MIRALAX) 17 gram/dose powder Follow-up Disposition: 
Return in about 6 weeks (around 3/25/2019), or if symptoms worsen or fail to improve, for Follow up of Hypertension and anemia. Salvador Birch MD 
 
 
[address] [facility]  
Outpatient Resident Progress Note Encounter Date: 2/12/2019 Chief Complaint Patient presents with  Hypertension History of Present Illness Patient is a 76 y.o. female, who presents to clinic for Hypertension Patient reports home  to 140 and DBP 70 to 80. Reports being compliant with medication and have no SE. She also reports that her fatigue and insomnia have been improving since Tx with Iron Supplementation and Melatonin respectively.  Reports that her BM have been slower than usual. Denies sweats, fever, fatigue, dizziness, lightheadedness, headaches, changes in vision, CP, SOB, abdominal pain or tenderness, nausea, vomiting, dysuria, hematuria, diarrhea, melena, hematochezia, leg swelling, or any other complains at this moment. Patient denies having Shingles and Pneumonia vaccination stating that she will think about having them. Review of Systems A complete ROS was reviewed and only pertinent items documented on HPI. Allergies - reviewed: Allergies Allergen Reactions  Statins-Hmg-Coa Reductase Inhibitors Other (comments)  Crestor [Rosuvastatin] Myalgia  Lipitor [Atorvastatin] Myalgia  Sulfa (Sulfonamide Antibiotics) Hives Other reaction(s): unknown  Tetanus Immune Globulin Unknown (comments)  Tetanus Vaccines And Toxoid Other (comments) Local reaction--arm red and swollen Medications - reviewed:  
Current Outpatient Medications Medication Sig  
 melatonin 5 mg cap capsule Take 2 Caps by mouth nightly.  polyethylene glycol (MIRALAX) 17 gram/dose powder Take 17 g by mouth daily.  amLODIPine (NORVASC) 10 mg tablet Take 1 Tab by mouth daily.  ferrous sulfate 325 mg (65 mg iron) tablet Take 1 Tab by mouth three (3) times daily (with meals).  docusate sodium (COLACE) 100 mg capsule Take 1 Cap by mouth three (3) times daily (with meals).  FLUoxetine (PROZAC) 10 mg capsule Take 1 Cap by mouth daily.  cloNIDine HCl (CATAPRES) 0.1 mg tablet Take 1 Tab by mouth two (2) times a day.  digoxin (LANOXIN) 0.125 mg tablet TAKE ONE TABLET BY MOUTH EVERY DAY  miscellaneous medical supply (BLOOD PRESSURE CUFF) misc Use to take blood pressure as recommended by MD  
 magnesium oxide (MAG-OX) 400 mg tablet Take 400 mg by mouth daily.  glucosamine-chondroitin (ARTHX) 500-400 mg cap Take 1 Cap by mouth daily.  Blood-Glucose Meter (BLOOD GLUCOSE MONITORING) monitoring kit Check BS daily  cholecalciferol, vitamin d3, (VITAMIN D) 1,000 unit tablet Take 1,000 Units by mouth daily.  PV W-O MAGDIEL/FERROUS FUMARATE/FA (M-VIT PO) Take 1 Tab by mouth daily.  ASCORBIC ACID (VITAMIN C PO) Take 1 Tab by mouth daily.  VITAMIN E ACETATE (VITAMIN E PO) Take 1 Tab by mouth daily. No current facility-administered medications for this visit. Past Medical History - reviewed: 
Past Medical History:  
Diagnosis Date  Arthritis  Depression  Diabetes [facility]  Hypercholesterolemia  Hypertension  PAF (paroxysmal atrial fibrillation) ([facility]  Sleep apnea  Status post gastric banding Family Medical History - reviewed: 
Family History Problem Relation Age of Onset  Diabetes Mother  Hypertension Mother  Heart Disease Mother  Stroke Mother  Hypertension Father  Stroke Father  Arthritis-rheumatoid Father  Alcohol abuse Paternal Grandfather  Asthma Neg Hx  Bleeding Prob Neg Hx  Cancer Neg Hx  Elevated Lipids Neg Hx   
 Headache Neg Hx  Lung Disease Neg Hx  Migraines Neg Hx  Psychiatric Disorder Neg Hx  Mental Retardation Neg Hx Objective Visit Vitals /62 (BP 1 Location: Left arm, BP Patient Position: Sitting) Pulse 68 Temp 98.2 °F (36.8 °C) (Oral) Resp 20 Ht 5' 8\" (1.727 m) Wt 167 lb (75.8 kg) SpO2 92% BMI 25.39 kg/m² Body mass index is 25.39 kg/m². Nursing note and vitals reviewed. Physical Exam 
Constitutional: Well-developed, well-nourished, and in no distress. HENT:  
Head: Normocephalic and atraumatic. Right Ear: External ear normal. Left Ear: External ear normal.  
Nose: Nose normal.  
Mouth/Throat: Oropharynx is clear and moist. No oropharyngeal erythema, no exudate. Eyes: Conjunctivae and EOM are normal. Pupils are equal, round, and reactive to light. Right eye exhibits no discharge. Left eye exhibits no discharge. No scleral icterus. Neck: Normal range of motion. Neck supple. No JVD present.  No tracheal deviation present. No thyromegaly present. Lymphadenopathy: No cervical adenopathy. Cardiovascular: Normal rate, regular rhythm, normal heart sounds and intact distal pulses. Exam reveals no gallop and no friction rub. No murmur heard. Pulmonary/Chest: Effort normal and breath sounds normal. No respiratory distress. No wheezes, no rales, no tenderness. Abdominal: Soft. Bowel sounds are normal. No distension and no mass. There is no tenderness. There is no rebound and no guarding. Musculoskeletal: Normal range of motion. Exhibits no edema, tenderness or deformity. Neurological: Alert and oriented. No focal abnormalities. Skin: Skin is warm and dry. No rash noted. Not diaphoretic. No erythema. No pallor. Psychiatric: Mood, memory, affect and judgment normal.  
 
Assessment / Plan Ms. Tristan Busch is a 76 y.o. female with the following medical condition(s): 1. Essential hypertension Stable. Pt reports to be compliant with current medications. Patient was counseled about controlling sodium intake. No adjustments to medications during this encounter.   
- BP goal for this patient is < 140/90 mmHg - MICROALBUMIN, UR, RAND W/ MICROALB/CREAT RATIO 
 
2. Iron deficiency anemia, unspecified iron deficiency anemia type Improving signs and symptoms. Will recheck Hgb levels in 3/2019. 3. Primary insomnia Improving. Continue current dose that patient is taking at home of Melatonin 10 mg qHS. - melatonin 5 mg cap capsule; Take 2 Caps by mouth nightly. Dispense: 90 Cap; Refill: 3 4. Drug-induced constipation Will add Miralax to Colace for constipation induced by Iron Supplementation. - polyethylene glycol (MIRALAX) 17 gram/dose powder; Take 17 g by mouth daily. Dispense: 850 g; Refill: 5 Patient was advised to return to clinic in case of worsening of symptoms or fail to improve.  Life threatening signs and symptoms were discussed and patient advised to go to the nearest ED for prompt evaluation and care in case of onset of any of these. Follow-up Disposition: 
Return in about 6 weeks (around 3/25/2019), or if symptoms worsen or fail to improve, for Follow up of Hypertension and anemia. · I have discussed the diagnosis with the patient and the intended plan as seen in the above orders. The patient has received an after-visit summary and questions were answered concerning future plans. I have discussed medication side effects and warnings with the patient as well. Patient/Plan discussed with Dr. Albin De Souza (Attending Physician) Neri Louise MD 
PGY-3 Family Medicine Resident Encounter Date: 2/12/2019

## 2019-02-12 NOTE — PATIENT INSTRUCTIONS
Constipation: Care Instructions Your Care Instructions Constipation means that you have a hard time passing stools (bowel movements). People pass stools from 3 times a day to once every 3 days. What is normal for you may be different. Constipation may occur with pain in the rectum and cramping. The pain may get worse when you try to pass stools. Sometimes there are small amounts of bright red blood on toilet paper or the surface of stools. This is because of enlarged veins near the rectum (hemorrhoids). A few changes in your diet and lifestyle may help you avoid ongoing constipation. Your doctor may also prescribe medicine to help loosen your stool. Some medicines can cause constipation. These include pain medicines and antidepressants. Tell your doctor about all the medicines you take. Your doctor may want to make a medicine change to ease your symptoms. Follow-up care is a key part of your treatment and safety. Be sure to make and go to all appointments, and call your doctor if you are having problems. It's also a good idea to know your test results and keep a list of the medicines you take. How can you care for yourself at home? · Drink plenty of fluids, enough so that your urine is light yellow or clear like water. If you have kidney, heart, or liver disease and have to limit fluids, talk with your doctor before you increase the amount of fluids you drink. · Include high-fiber foods in your diet each day. These include fruits, vegetables, beans, and whole grains. · Get at least 30 minutes of exercise on most days of the week. Walking is a good choice. You also may want to do other activities, such as running, swimming, cycling, or playing tennis or team sports. · Take a fiber supplement, such as Citrucel or Metamucil, every day. Read and follow all instructions on the label. · Schedule time each day for a bowel movement. A daily routine may help. Take your time having your bowel movement. · Support your feet with a small step stool when you sit on the toilet. This helps flex your hips and places your pelvis in a squatting position. · Your doctor may recommend an over-the-counter laxative to relieve your constipation. Examples are Milk of Magnesia and MiraLax. Read and follow all instructions on the label. Do not use laxatives on a long-term basis. When should you call for help? Call your doctor now or seek immediate medical care if: 
  · You have new or worse belly pain.  
  · You have new or worse nausea or vomiting.  
  · You have blood in your stools.  
 Watch closely for changes in your health, and be sure to contact your doctor if: 
  · Your constipation is getting worse.  
  · You do not get better as expected. Where can you learn more? Go to http://joseph-joel.info/. Enter [id number] in the search box to learn more about \"Constipation: Care Instructions. \" Current as of: September 23, 2018 Content Version: 11.9 © 9874-1178 [employer]. Care instructions adapted under license by [employer] (which disclaims liability or warranty for this information). If you have questions about a medical condition or this instruction, always ask your healthcare professional. [place] [address] any warranty or liability for your use of this information. High Blood Pressure: Care Instructions Overview It's normal for blood pressure to go up and down throughout the day. But if it stays up, you have high blood pressure. Another name for high blood pressure is hypertension. Despite what a lot of people think, high blood pressure usually doesn't cause headaches or make you feel dizzy or lightheaded. It usually has no symptoms. But it does increase your risk of stroke, heart attack, and other problems. You and your doctor will talk about your risks of these problems based on your blood pressure. Your doctor will give you a goal for your blood pressure. Your goal will be based on your health and your age. Lifestyle changes, such as eating healthy and being active, are always important to help lower blood pressure. You might also take medicine to reach your blood pressure goal. 
Follow-up care is a key part of your treatment and safety. Be sure to make and go to all appointments, and call your doctor if you are having problems. It's also a good idea to know your test results and keep a list of the medicines you take. How can you care for yourself at home? Medical treatment · If you stop taking your medicine, your blood pressure will go back up. You may take one or more types of medicine to lower your blood pressure. Be safe with medicines. Take your medicine exactly as prescribed. Call your doctor if you think you are having a problem with your medicine. · Talk to your doctor before you start taking aspirin every day. Aspirin can help certain people lower their risk of a heart attack or stroke. But taking aspirin isn't right for everyone, because it can cause serious bleeding. · See your doctor regularly. You may need to see the doctor more often at first or until your blood pressure comes down. · If you are taking blood pressure medicine, talk to your doctor before you take decongestants or anti-inflammatory medicine, such as ibuprofen. Some of these medicines can raise blood pressure. · Learn how to check your blood pressure at home. Lifestyle changes · Stay at a healthy weight. This is especially important if you put on weight around the waist. Losing even 10 pounds can help you lower your blood pressure. · If your doctor recommends it, get more exercise. Walking is a good choice. Bit by bit, increase the amount you walk every day. Try for at least 30 minutes on most days of the week. You also may want to swim, bike, or do other activities. · Avoid or limit alcohol. Talk to your doctor about whether you can drink any alcohol. · Try to limit how much sodium you eat to less than 2,300 milligrams (mg) a day. Your doctor may ask you to try to eat less than 1,500 mg a day. · Eat plenty of fruits (such as bananas and oranges), vegetables, legumes, whole grains, and low-fat dairy products. · Lower the amount of saturated fat in your diet. Saturated fat is found in animal products such as milk, cheese, and meat. Limiting these foods may help you lose weight and also lower your risk for heart disease. · Do not smoke. Smoking increases your risk for heart attack and stroke. If you need help quitting, talk to your doctor about stop-smoking programs and medicines. These can increase your chances of quitting for good. When should you call for help? Call 911 anytime you think you may need emergency care. This may mean having symptoms that suggest that your blood pressure is causing a serious heart or blood vessel problem. Your blood pressure may be over 180/120. 
 For example, call 911 if: 
  · You have symptoms of a heart attack. These may include: 
? Chest pain or pressure, or a strange feeling in the chest. 
? Sweating. ? Shortness of breath. ? Nausea or vomiting. ? Pain, pressure, or a strange feeling in the back, neck, jaw, or upper belly or in one or both shoulders or arms. ? Lightheadedness or sudden weakness. ? A fast or irregular heartbeat.  
  · You have symptoms of a stroke. These may include: 
? Sudden numbness, tingling, weakness, or loss of movement in your face, arm, or leg, especially on only one side of your body. ? Sudden vision changes. ? Sudden trouble speaking. ? Sudden confusion or trouble understanding simple statements. ? Sudden problems with walking or balance. ? A sudden, severe headache that is different from past headaches.  
  · You have severe back or belly pain.  Do not wait until your blood pressure comes down on its own. Get help right away. 
 Call your doctor now or seek immediate care if: 
  · Your blood pressure is much higher than normal (such as 180/120 or higher), but you don't have symptoms.  
  · You think high blood pressure is causing symptoms, such as: 
? Severe headache. 
? Blurry vision.  
 Watch closely for changes in your health, and be sure to contact your doctor if: 
  · Your blood pressure measures higher than your doctor recommends at least 2 times. That means the top number is higher or the bottom number is higher, or both.  
  · You think you may be having side effects from your blood pressure medicine. Where can you learn more? Go to http://joseph-joel.info/. Enter A928 in the search box to learn more about \"High Blood Pressure: Care Instructions. \" Current as of: July 22, 2018 Content Version: 11.9 © 2921-6354 [employer]. Care instructions adapted under license by [employer] (which disclaims liability or warranty for this information). If you have questions about a medical condition or this instruction, always ask your healthcare professional. [place] [address] any warranty or liability for your use of this information.

## 2019-02-13 LAB
ALBUMIN/CREAT UR: 65.8 MG/G CREAT (ref 0–30)
CREAT UR-MCNC: 480.3 MG/DL
MICROALBUMIN UR-MCNC: 316 UG/ML

## 2019-02-25 DIAGNOSIS — D50.9 IRON DEFICIENCY ANEMIA, UNSPECIFIED IRON DEFICIENCY ANEMIA TYPE: ICD-10-CM

## 2019-02-26 ENCOUNTER — OFFICE VISIT (OUTPATIENT)
Dept: FAMILY MEDICINE CLINIC | Age: 69
End: 2019-02-26

## 2019-02-26 VITALS
OXYGEN SATURATION: 97 % | DIASTOLIC BLOOD PRESSURE: 87 MMHG | TEMPERATURE: 98.6 F | HEART RATE: 60 BPM | BODY MASS INDEX: 26.07 KG/M2 | HEIGHT: 68 IN | RESPIRATION RATE: 16 BRPM | SYSTOLIC BLOOD PRESSURE: 169 MMHG | WEIGHT: 172 LBS

## 2019-02-26 DIAGNOSIS — K59.03 DRUG-INDUCED CONSTIPATION: ICD-10-CM

## 2019-02-26 DIAGNOSIS — D50.9 IRON DEFICIENCY ANEMIA, UNSPECIFIED IRON DEFICIENCY ANEMIA TYPE: Primary | ICD-10-CM

## 2019-02-26 DIAGNOSIS — I10 ESSENTIAL HYPERTENSION: ICD-10-CM

## 2019-02-26 LAB
ERYTHROCYTE [DISTWIDTH] IN BLOOD BY AUTOMATED COUNT: 18.5 % (ref 12.3–15.4)
HCT VFR BLD AUTO: 36.6 % (ref 34–46.6)
HGB BLD-MCNC: 11.7 G/DL (ref 11.1–15.9)
MCH RBC QN AUTO: 25.1 PG (ref 26.6–33)
MCHC RBC AUTO-ENTMCNC: 32 G/DL (ref 31.5–35.7)
MCV RBC AUTO: 78 FL (ref 79–97)
PLATELET # BLD AUTO: 289 X10E3/UL (ref 150–379)
RBC # BLD AUTO: 4.67 X10E6/UL (ref 3.77–5.28)
WBC # BLD AUTO: 11.3 X10E3/UL (ref 3.4–10.8)

## 2019-02-26 NOTE — PROGRESS NOTES
Results reviewed. Hgb levels improved. Recommended to decrease frequency of Iron supplementation to once a day. Patient informed of results and recommendations.      Rajeev Worthington MD  PGY-3 Family Medicine Resident

## 2019-02-26 NOTE — PROGRESS NOTES
1. Have you been to the ER, urgent care clinic since your last visit? Hospitalized since your last visit? No    2. Have you seen or consulted any other health care providers outside of the [address] [place] [address] since your last visit? Include any pap smears or colon screening.  No  Reviewed record in preparation for visit and have necessary documentation  Pt did not bring medication to office visit for review    Goals that were addressed and/or need to be completed during or after this appointment include   Health Maintenance Due   Topic Date Due    Bone Densitometry (Dexa) Screening  11/06/2015    Pneumococcal 65+ Low/Medium Risk (1 of 2 - PCV13) 11/06/2015    BREAST CANCER SCRN MAMMOGRAM  01/10/2016     Pt refused all HM  she requests no calls for reminders of HM

## 2019-02-26 NOTE — PROGRESS NOTES
[facility]     Outpatient Resident Progress Note    Encounter Date: 2/26/2019    Chief Complaint   Patient presents with    Hypertension    Anemia       History of Present Illness    Patient is a 76 y.o. female, who presents to clinic for follow up of Hypertension and Anemia  Patient reports last night BP was 118/64. Reports being compliant with medication and have no SE. She took medication at clinic. Reports having stress today and not feeling well as she have not had a BM in 3 days. Usually she have BM at least once a day. Denies sweats, fever, fatigue, dizziness, lightheadedness, headaches, changes in vision, CP, SOB, abdominal pain or tenderness, nausea, vomiting, dysuria, hematuria, diarrhea, melena, hematochezia, leg swelling, or any other complains at this moment. Review of Systems    A complete ROS was reviewed and only pertinent items documented on HPI. Allergies - reviewed: Allergies   Allergen Reactions    Statins-Hmg-Coa Reductase Inhibitors Other (comments)    Crestor [Rosuvastatin] Myalgia    Lipitor [Atorvastatin] Myalgia    Sulfa (Sulfonamide Antibiotics) Hives     Other reaction(s): unknown    Tetanus Immune Globulin Unknown (comments)    Tetanus Vaccines And Toxoid Other (comments)     Local reaction--arm red and swollen         Medications - reviewed:   Current Outpatient Medications   Medication Sig    melatonin 5 mg cap capsule Take 2 Caps by mouth nightly.  polyethylene glycol (MIRALAX) 17 gram/dose powder Take 17 g by mouth daily.  amLODIPine (NORVASC) 10 mg tablet Take 1 Tab by mouth daily.  ferrous sulfate 325 mg (65 mg iron) tablet Take 1 Tab by mouth three (3) times daily (with meals).  docusate sodium (COLACE) 100 mg capsule Take 1 Cap by mouth three (3) times daily (with meals).  FLUoxetine (PROZAC) 10 mg capsule Take 1 Cap by mouth daily.     cloNIDine HCl (CATAPRES) 0.1 mg tablet Take 1 Tab by mouth two (2) times a day.  digoxin (LANOXIN) 0.125 mg tablet TAKE ONE TABLET BY MOUTH EVERY DAY    miscellaneous medical supply (BLOOD PRESSURE CUFF) misc Use to take blood pressure as recommended by MD    magnesium oxide (MAG-OX) 400 mg tablet Take 400 mg by mouth daily.  glucosamine-chondroitin (ARTHX) 500-400 mg cap Take 1 Cap by mouth daily.  Blood-Glucose Meter (BLOOD GLUCOSE MONITORING) monitoring kit Check BS daily    cholecalciferol, vitamin d3, (VITAMIN D) 1,000 unit tablet Take 1,000 Units by mouth daily.  PV W-O MAGDIEL/FERROUS FUMARATE/FA (M-VIT PO) Take 1 Tab by mouth daily.  ASCORBIC ACID (VITAMIN C PO) Take 1 Tab by mouth daily.  VITAMIN E ACETATE (VITAMIN E PO) Take 1 Tab by mouth daily. No current facility-administered medications for this visit. Past Medical History - reviewed:  Past Medical History:   Diagnosis Date    Arthritis     Depression     Diabetes ([address]     Hypercholesterolemia     Hypertension     [facility]     Sleep apnea     Status post gastric banding        Family Medical History - reviewed:  Family History   Problem Relation Age of Onset    Diabetes Mother     Hypertension Mother     Heart Disease Mother     Stroke Mother     Hypertension Father     Stroke Father     Arthritis-rheumatoid Father     Alcohol abuse Paternal Grandfather     Asthma Neg Hx     Bleeding Prob Neg Hx     Cancer Neg Hx     Elevated Lipids Neg Hx     Headache Neg Hx     Lung Disease Neg Hx     Migraines Neg Hx     Psychiatric Disorder Neg Hx     Mental Retardation Neg Hx        Objective  Visit Vitals  /87 (BP 1 Location: Left arm, BP Patient Position: Sitting)   Pulse 60   Temp 98.6 °F (37 °C) (Oral)   Resp 16   Ht 5' 8\" (1.727 m)   Wt 172 lb (78 kg)   SpO2 97%   BMI 26.15 kg/m²     Body mass index is 26.15 kg/m². Nursing note and vitals reviewed.  The following vital sign(s) noted to be abnormal: Elevated BP, higher than goal BP < 140/90. Physical Exam  Constitutional: Well-developed, well-nourished, and in no distress. Cardiovascular: Normal rate, regular rhythm, normal heart sounds and intact distal pulses. Exam reveals no gallop and no friction rub. No murmur heard. Pulmonary/Chest: Effort normal and breath sounds normal. No respiratory distress. No wheezes, no rales, no tenderness. Neurological: Alert and oriented. No focal abnormalities. Skin: Skin is warm and dry. No rash noted. Not diaphoretic. No erythema. No pallor. Psychiatric: Mood, memory, affect and judgment normal.     Orders Only on 02/25/2019   Component Date Value Ref Range Status    WBC 02/25/2019 11.3* 3.4 - 10.8 x10E3/uL Final    RBC 02/25/2019 4.67  3.77 - 5.28 x10E6/uL Final    HGB 02/25/2019 11.7  11.1 - 15.9 g/dL Final    HCT 02/25/2019 36.6  34.0 - 46.6 % Final    MCV 02/25/2019 78* 79 - 97 fL Final    MCH 02/25/2019 25.1* 26.6 - 33.0 pg Final    MCHC 02/25/2019 32.0  31.5 - 35.7 g/dL Final    RDW 02/25/2019 18.5* 12.3 - 15.4 % Final    PLATELET 57/42/2756 726  150 - 379 x10E3/uL Final   Office Visit on 02/12/2019   Component Date Value Ref Range Status    Creatinine, urine 02/12/2019 480.3  Not Estab. mg/dL Final    Microalbumin, urine 02/12/2019 316.0  Not Estab. ug/mL Final    Microalb/Creat ratio (ug/mg creat.) 02/12/2019 65.8* 0.0 - 30.0 mg/g creat Final    Comment:                      Normal:                0.0 -  30.0                       Albuminuria:          31.0 - 300.0                       Clinical albuminuria:       >300.0         Assessment / Plan   Ms. Fantasma Cobb is a 76 y.o. female with the following medical condition(s):    1. Iron deficiency anemia, unspecified iron deficiency anemia type  Improved. Labs on 2/25/2019 show Hgb 11.9. Decrease frequency of iron supplementation to once a day. 2. Essential hypertension  Elevated BP in the office was discussed with patient.  Pt reports to be compliant with current medications. A log was provided and the patient was instructed on taking measurements right after waking up in the morning and just before bedtime. These results will be evaluated during next visit that was recommended to be within the next 2 weeks. Patient was counseled about controlling sodium intake. No adjustments to medications during this encounter. Recommended to bring BP monitor to compare readings at clinic.  - BP goal for this patient is < 140/90 mmHg  - Follow up in 1 week(s) for elevated BP reevaluation if most readings at home are high. 3. Drug-induced constipation  Probably due to Iron Supplementation TID. Might improve with new dose. Recommended to increase water intake, use OTC laxative PRN, continue using Miralax and Colace to regulate BM. Patient was advised to return to clinic in case of worsening of symptoms or fail to improve. Life threatening signs and symptoms were discussed and patient advised to go to the nearest ED for prompt evaluation and care in case of onset of any of these. Follow-up Disposition:  Return in 1 week if most BP >140/90, otherwise in about 3 months (around 5/26/2019) for Follow up Chronic Conditions. · I have discussed the diagnosis with the patient and the intended plan as seen in the above orders. The patient has received an after-visit summary and questions were answered concerning future plans. I have discussed medication side effects and warnings with the patient as well.       Patient/Plan discussed with Dr. Helena Nissen (Attending Physician)      Connie Alas MD  PGY-3 Family Medicine Resident  Encounter Date: 2/26/2019

## 2019-02-26 NOTE — PATIENT INSTRUCTIONS
Usamn Bhatia with [facility]  [address] [place], [place] [address] [address] [facility]  [phone]    Monitor blood pressure outside the office several times weekly at different times during the day and evening. Bring the record to me in 3 weeks for review. Blood Pressure Record     Patient Name:  ______________________ :  ______________________    Date/Time BP Reading Pulse                                                                                                                                                                                                Home Blood Pressure Test: About This Test  What is it? A home blood pressure test allows you to keep track of your blood pressure at home. Blood pressure is a measure of the force of blood against the walls of your arteries. Blood pressure readings include two numbers, such as 130/80 (say \"130 over 80\"). The first number is the systolic pressure. The second number is the diastolic pressure. Why is this test done? You may do this test at home to:  · Find out if you have high blood pressure. · Track your blood pressure if you have high blood pressure. · Track how well medicine is working to reduce high blood pressure. · Check how lifestyle changes, such as weight loss and exercise, are affecting blood pressure. How can you prepare for the test?  · Do not use caffeine, tobacco, or medicines known to raise blood pressure (such as nasal decongestant sprays) for at least 30 minutes before taking your blood pressure. · Do not exercise for at least 30 minutes before taking your blood pressure. What happens before the test?  Take your blood pressure while you feel comfortable and relaxed.  Sit quietly with both feet on the floor for at least 5 minutes before the test.  What happens during the test?  · Sit with your arm slightly bent and resting on a table so that your upper arm is at the same level as your heart.  · Roll up your sleeve or take off your shirt to expose your upper arm. · Wrap the blood pressure cuff around your upper arm so that the lower edge of the cuff is about 1 inch above the bend of your elbow. Proceed with the following steps depending on if you are using an automatic or manual pressure monitor. Automatic blood pressure monitors  · Press the on/off button on the automatic monitor and wait until the ready-to-measure \"heart\" symbol appears next to zero in the display window. · Press the start button. The cuff will inflate and deflate by itself. · Your blood pressure numbers will appear on the screen. · Write your numbers in your log book, along with the date and time. Manual blood pressure monitors  · Place the earpieces of a stethoscope in your ears, and place the bell of the stethoscope over the artery, just below the cuff. · Close the valve on the rubber inflating bulb. · Squeeze the bulb rapidly with your opposite hand to inflate the cuff until the dial or column of mercury reads about 30 mm Hg higher than your usual systolic pressure. If you do not know your usual pressure, inflate the cuff to 210 mm Hg or until the pulse at your wrist disappears. · Open the pressure valve just slightly by twisting or pressing the valve on the bulb. · As you watch the pressure slowly fall, note the level on the dial at which you first start to hear a pulsing or tapping sound through the stethoscope. This is your systolic blood pressure. · Continue letting the air out slowly. The sounds will become muffled and will finally disappear. Note the pressure when the sounds completely disappear. This is your diastolic blood pressure. Let out all the remaining air. · Write your numbers in your log book, along with the date and time. What else should you know about the test?  It is more accurate to take the average of several readings made throughout the day than to rely on a single reading.  It's normal for blood pressure to go up and down throughout the day. Follow-up care is a key part of your treatment and safety. Be sure to make and go to all appointments, and call your doctor if you are having problems. It's also a good idea to keep a list of the medicines you take. Where can you learn more? Go to http://joseph-joel.info/. Enter C427 in the search box to learn more about \"Home Blood Pressure Test: About This Test.\"  Current as of: July 22, 2018  Content Version: 11.9  © 6553-3368 [employer]. Care instructions adapted under license by [employer] (which disclaims liability or warranty for this information). If you have questions about a medical condition or this instruction, always ask your healthcare professional. [address] any warranty or liability for your use of this information.

## 2019-04-10 DIAGNOSIS — F33.41 RECURRENT MAJOR DEPRESSIVE DISORDER, IN PARTIAL REMISSION (HCC): ICD-10-CM

## 2019-04-16 RX ORDER — FLUOXETINE 10 MG/1
CAPSULE ORAL
Qty: 90 CAP | Refills: 0 | Status: SHIPPED | OUTPATIENT
Start: 2019-04-16 | End: 2019-05-21 | Stop reason: SDUPTHER

## 2019-05-21 ENCOUNTER — OFFICE VISIT (OUTPATIENT)
Dept: FAMILY MEDICINE CLINIC | Age: 69
End: 2019-05-21

## 2019-05-21 VITALS
RESPIRATION RATE: 20 BRPM | DIASTOLIC BLOOD PRESSURE: 76 MMHG | OXYGEN SATURATION: 97 % | HEART RATE: 61 BPM | TEMPERATURE: 97.7 F | BODY MASS INDEX: 27.13 KG/M2 | HEIGHT: 68 IN | WEIGHT: 179 LBS | SYSTOLIC BLOOD PRESSURE: 143 MMHG

## 2019-05-21 DIAGNOSIS — F33.41 RECURRENT MAJOR DEPRESSIVE DISORDER, IN PARTIAL REMISSION (HCC): ICD-10-CM

## 2019-05-21 DIAGNOSIS — I10 ESSENTIAL HYPERTENSION: ICD-10-CM

## 2019-05-21 DIAGNOSIS — F51.01 PRIMARY INSOMNIA: ICD-10-CM

## 2019-05-21 DIAGNOSIS — I48.0 PAF (PAROXYSMAL ATRIAL FIBRILLATION) (HCC): ICD-10-CM

## 2019-05-21 DIAGNOSIS — D50.9 IRON DEFICIENCY ANEMIA, UNSPECIFIED IRON DEFICIENCY ANEMIA TYPE: ICD-10-CM

## 2019-05-21 RX ORDER — AMLODIPINE BESYLATE 10 MG/1
10 TABLET ORAL DAILY
Qty: 90 TAB | Refills: 2 | Status: SHIPPED | OUTPATIENT
Start: 2019-05-21 | End: 2019-06-11 | Stop reason: SDUPTHER

## 2019-05-21 RX ORDER — MELATONIN 5 MG
10 CAPSULE ORAL
Qty: 90 CAP | Refills: 3 | Status: SHIPPED | OUTPATIENT
Start: 2019-05-21

## 2019-05-21 RX ORDER — DIGOXIN 125 MCG
TABLET ORAL
Qty: 90 TAB | Refills: 3 | Status: SHIPPED | OUTPATIENT
Start: 2019-05-21 | End: 2019-06-11 | Stop reason: SDUPTHER

## 2019-05-21 RX ORDER — FLUOXETINE 10 MG/1
10 CAPSULE ORAL DAILY
Qty: 90 CAP | Refills: 0 | Status: SHIPPED | OUTPATIENT
Start: 2019-05-21 | End: 2019-06-11 | Stop reason: SDUPTHER

## 2019-05-21 RX ORDER — CLONIDINE HYDROCHLORIDE 0.2 MG/1
0.2 TABLET ORAL 2 TIMES DAILY
Qty: 180 TAB | Refills: 3 | Status: SHIPPED | OUTPATIENT
Start: 2019-05-21 | End: 2019-05-29 | Stop reason: SDUPTHER

## 2019-05-21 NOTE — PROGRESS NOTES
1. Have you been to the ER, urgent care clinic since your last visit? Hospitalized since your last visit?no    2. Have you seen or consulted any other health care providers outside of the [address] [place] [address] since your last visit? Include any pap smears or colon screening.  No  Reviewed record in preparation for visit and have necessary documentation  Pt did not bring medication to office visit for review    Goals that were addressed and/or need to be completed during or after this appointment include   Health Maintenance Due   Topic Date Due    Shingrix Vaccine Age 50> (1 of 2) 11/06/2000    Pneumococcal 65+ years (1 of 2 - PCV13) 11/06/2015    HEMOGLOBIN A1C Q6M  12/20/2017     Declines all immunizations

## 2019-05-21 NOTE — PATIENT INSTRUCTIONS
Fredi 22 Affiliated with [address] [place] [address], [facility] Ariella Lujan [facility] 
[phone] Monitor blood pressure outside the office several times weekly at different times during the day and evening. Bring the record to me in 3 weeks for review. Blood Pressure Record Patient Name:  ______________________ :  ______________________ Date/Time BP Reading Pulse Home Blood Pressure Test: About This Test 
What is it? A home blood pressure test allows you to keep track of your blood pressure at home. Blood pressure is a measure of the force of blood against the walls of your arteries. Blood pressure readings include two numbers, such as 130/80 (say \"130 over 80\"). The first number is the systolic pressure. The second number is the diastolic pressure. Why is this test done? You may do this test at home to: · Find out if you have high blood pressure. · Track your blood pressure if you have high blood pressure. · Track how well medicine is working to reduce high blood pressure. · Check how lifestyle changes, such as weight loss and exercise, are affecting blood pressure. How can you prepare for the test? 
· Do not use caffeine, tobacco, or medicines known to raise blood pressure (such as nasal decongestant sprays) for at least 30 minutes before taking your blood pressure. · Do not exercise for at least 30 minutes before taking your blood pressure. What happens before the test? 
Take your blood pressure while you feel comfortable and relaxed.  Sit quietly with both feet on the floor for at least 5 minutes before the test. 
What happens during the test? 
· Sit with your arm slightly bent and resting on a table so that your upper arm is at the same level as your heart. · Roll up your sleeve or take off your shirt to expose your upper arm. · Wrap the blood pressure cuff around your upper arm so that the lower edge of the cuff is about 1 inch above the bend of your elbow. Proceed with the following steps depending on if you are using an automatic or manual pressure monitor. Automatic blood pressure monitors · Press the on/off button on the automatic monitor and wait until the ready-to-measure \"heart\" symbol appears next to zero in the display window. · Press the start button. The cuff will inflate and deflate by itself. · Your blood pressure numbers will appear on the screen. · Write your numbers in your log book, along with the date and time. Manual blood pressure monitors · Place the earpieces of a stethoscope in your ears, and place the bell of the stethoscope over the artery, just below the cuff. · Close the valve on the rubber inflating bulb. · Squeeze the bulb rapidly with your opposite hand to inflate the cuff until the dial or column of mercury reads about 30 mm Hg higher than your usual systolic pressure. If you do not know your usual pressure, inflate the cuff to 210 mm Hg or until the pulse at your wrist disappears. · Open the pressure valve just slightly by twisting or pressing the valve on the bulb. · As you watch the pressure slowly fall, note the level on the dial at which you first start to hear a pulsing or tapping sound through the stethoscope. This is your systolic blood pressure. · Continue letting the air out slowly. The sounds will become muffled and will finally disappear. Note the pressure when the sounds completely disappear. This is your diastolic blood pressure. Let out all the remaining air. · Write your numbers in your log book, along with the date and time.  
What else should you know about the test? 
It is more accurate to take the average of several readings made throughout the day than to rely on a single reading. It's normal for blood pressure to go up and down throughout the day. Follow-up care is a key part of your treatment and safety. Be sure to make and go to all appointments, and call your doctor if you are having problems. It's also a good idea to keep a list of the medicines you take. Where can you learn more? Go to http://joseph-joel.info/. Enter C427 in the search box to learn more about \"Home Blood Pressure Test: About This Test.\" Current as of: July 22, 2018 Content Version: 11.9 © 6749-9828 [employer], [employer]. Care instructions adapted under license by [employer] (which disclaims liability or warranty for this information). If you have questions about a medical condition or this instruction, always ask your healthcare professional. [address] any warranty or liability for your use of this information.

## 2019-05-21 NOTE — PROGRESS NOTES
[facility]     Outpatient Resident Progress Note    Encounter Date: 5/21/2019    Chief Complaint   Patient presents with    Diabetes    Hypertension       History of Present Illness    Patient is a 76 y.o. female, who presents to clinic for Diabetes and Hypertension  Patient reports home  to 150 and DBP 70 to 80. Reports being compliant with medication and have no SE. Reports BG of 108 to 118 at home. Denies sweats, fever, fatigue, dizziness, lightheadedness, headaches, changes in vision, CP, SOB, abdominal pain or tenderness, nausea, vomiting, dysuria, hematuria, diarrhea, melena, hematochezia, leg swelling, or any other complains at this moment. Review of Systems    A complete ROS was reviewed and only pertinent items documented on HPI. Allergies - reviewed: Allergies   Allergen Reactions    Statins-Hmg-Coa Reductase Inhibitors Other (comments)    Crestor [Rosuvastatin] Myalgia    Lipitor [Atorvastatin] Myalgia    Sulfa (Sulfonamide Antibiotics) Hives     Other reaction(s): unknown    Tetanus Immune Globulin Unknown (comments)    Tetanus Vaccines And Toxoid Other (comments)     Local reaction--arm red and swollen         Medications - reviewed:   Current Outpatient Medications   Medication Sig    FLUoxetine (PROZAC) 10 mg capsule TAKE 1 CAPSULE BY MOUTH ONCE DAILY    melatonin 5 mg cap capsule Take 2 Caps by mouth nightly.  amLODIPine (NORVASC) 10 mg tablet Take 1 Tab by mouth daily.  ferrous sulfate 325 mg (65 mg iron) tablet Take 1 Tab by mouth three (3) times daily (with meals).  cloNIDine HCl (CATAPRES) 0.1 mg tablet Take 1 Tab by mouth two (2) times a day.  digoxin (LANOXIN) 0.125 mg tablet TAKE ONE TABLET BY MOUTH EVERY DAY    miscellaneous medical supply (BLOOD PRESSURE CUFF) misc Use to take blood pressure as recommended by MD    magnesium oxide (MAG-OX) 400 mg tablet Take 400 mg by mouth daily.     Blood-Glucose Meter (BLOOD GLUCOSE MONITORING) monitoring kit Check BS daily    cholecalciferol, vitamin d3, (VITAMIN D) 1,000 unit tablet Take 1,000 Units by mouth daily.  PV W-O MAGDIEL/FERROUS FUMARATE/FA (M-VIT PO) Take 1 Tab by mouth daily.  ASCORBIC ACID (VITAMIN C PO) Take 1 Tab by mouth daily.  VITAMIN E ACETATE (VITAMIN E PO) Take 1 Tab by mouth daily.  polyethylene glycol (MIRALAX) 17 gram/dose powder Take 17 g by mouth daily.  docusate sodium (COLACE) 100 mg capsule Take 1 Cap by mouth three (3) times daily (with meals).  glucosamine-chondroitin (ARTHX) 500-400 mg cap Take 1 Cap by mouth daily. No current facility-administered medications for this visit. Past Medical History - reviewed:  Past Medical History:   Diagnosis Date    Arthritis     Depression     Diabetes ([address]     Hypercholesterolemia     Hypertension     [facility]     Sleep apnea     Status post gastric banding        Family Medical History - reviewed:  Family History   Problem Relation Age of Onset    Diabetes Mother     Hypertension Mother     Heart Disease Mother     Stroke Mother     Hypertension Father     Stroke Father     Arthritis-rheumatoid Father     Alcohol abuse Paternal Grandfather     Asthma Neg Hx     Bleeding Prob Neg Hx     Cancer Neg Hx     Elevated Lipids Neg Hx     Headache Neg Hx     Lung Disease Neg Hx     Migraines Neg Hx     Psychiatric Disorder Neg Hx     Mental Retardation Neg Hx        Objective  Visit Vitals  /76 (BP 1 Location: Left arm, BP Patient Position: Sitting)   Pulse 61   Temp 97.7 °F (36.5 °C) (Oral)   Resp 20   Ht 5' 8\" (1.727 m)   Wt 179 lb (81.2 kg)   SpO2 97%   BMI 27.22 kg/m²     Body mass index is 27.22 kg/m². Nursing note and vitals reviewed. The following vital sign(s) noted to be abnormal: Elevated BP, higher than goal BP < 140/90.      Physical Exam  Constitutional: Well-developed, well-nourished, and in no distress. Cardiovascular: Normal rate, regular rhythm, normal heart sounds and intact distal pulses. Exam reveals no gallop and no friction rub. No murmur heard. Pulmonary/Chest: Effort normal and breath sounds normal. No respiratory distress. No wheezes, no rales, no tenderness. Neurological: Alert and oriented to person, place, and time. Normal reflexes. No cranial nerve deficit. Gait normal. Coordination normal.   Skin: Skin is warm and dry. No rash noted. Not diaphoretic. No erythema. No pallor. Psychiatric: Mood, memory, affect and judgment normal.     Assessment / Plan   Ms. Gillian Chu is a 76 y.o. female with the following medical condition(s):    1. Essential hypertension  Elevated BP in the office was discussed with patient. Pt reports to be compliant with current medications. A log was provided and the patient was instructed on taking measurements right after waking up in the morning and just before bedtime. These results will be evaluated during next visit that was recommended to be within the next 2 weeks. Patient was counseled about controlling sodium intake. Increased Clonidine from 0.1 mg BID to 0.2 BID.   - BP goal for this patient is < 140/90 mmHg  - Follow up in 2 week(s) for elevated BP reevaluation. - amLODIPine (NORVASC) 10 mg tablet; Take 1 Tab by mouth daily. Dispense: 90 Tab; Refill: 2  - cloNIDine HCl (CATAPRES) 0.2 mg tablet; Take 1 Tab by mouth two (2) times a day. Dispense: 180 Tab; Refill: 3    2. PAF (paroxysmal atrial fibrillation) ([facility])  - digoxin (LANOXIN) 0.125 mg tablet; TAKE ONE TABLET BY MOUTH EVERY DAY  Dispense: 90 Tab; Refill: 3    3. Recurrent major depressive disorder, in partial remission ([facility])  - FLUoxetine (PROZAC) 10 mg capsule; Take 1 Cap by mouth daily. Dispense: 90 Cap; Refill: 0    4. Primary insomnia  - melatonin 5 mg cap capsule; Take 2 Caps by mouth nightly. Dispense: 90 Cap; Refill: 3    5.  Iron deficiency anemia, unspecified iron deficiency anemia type  Stable. Encourage to eat high iron foods. Life threatening signs and symptoms were discussed and patient advised to go to the nearest ED for prompt evaluation and care in case of onset of any of these. · I have discussed the diagnosis with the patient and the intended plan as seen in the above orders. The patient has received an after-visit summary and questions were answered concerning future plans. I have discussed medication side effects and warnings with the patient as well.       Patient/Plan discussed with Dr. Tanya Lambert (Attending Physician)      Jeniffer Nguyen MD  PGY-3 Family Medicine Resident  Encounter Date: 5/21/2019

## 2019-05-28 DIAGNOSIS — I10 ESSENTIAL HYPERTENSION: ICD-10-CM

## 2019-05-28 NOTE — TELEPHONE ENCOUNTER
Pt is requesting a refill for Rx Clonidine HCI 0.2mg at [facility] [phone]). She needs just enough until she receives her Rx in the mail. Best contact is [phone]. Message taken by Call Center. Called and talked to the patient. She needs a 10 day supply.   Last office visit was 5/21/19

## 2019-05-29 RX ORDER — CLONIDINE HYDROCHLORIDE 0.2 MG/1
0.2 TABLET ORAL 2 TIMES DAILY
Qty: 20 TAB | Refills: 3 | Status: SHIPPED | OUTPATIENT
Start: 2019-05-29 | End: 2019-06-11 | Stop reason: SDUPTHER

## 2019-06-04 ENCOUNTER — OFFICE VISIT (OUTPATIENT)
Dept: FAMILY MEDICINE CLINIC | Age: 69
End: 2019-06-04

## 2019-06-04 ENCOUNTER — TELEPHONE (OUTPATIENT)
Dept: FAMILY MEDICINE CLINIC | Age: 69
End: 2019-06-04

## 2019-06-04 VITALS
TEMPERATURE: 99.8 F | DIASTOLIC BLOOD PRESSURE: 64 MMHG | SYSTOLIC BLOOD PRESSURE: 125 MMHG | HEART RATE: 79 BPM | HEIGHT: 68 IN | BODY MASS INDEX: 26.52 KG/M2 | OXYGEN SATURATION: 90 % | RESPIRATION RATE: 20 BRPM | WEIGHT: 175 LBS

## 2019-06-04 DIAGNOSIS — R05.9 COUGH: ICD-10-CM

## 2019-06-04 DIAGNOSIS — R06.02 SOB (SHORTNESS OF BREATH): ICD-10-CM

## 2019-06-04 DIAGNOSIS — J18.9 COMMUNITY ACQUIRED PNEUMONIA OF RIGHT LOWER LOBE OF LUNG: Primary | ICD-10-CM

## 2019-06-04 RX ORDER — AZITHROMYCIN 500 MG/1
TABLET, FILM COATED ORAL
Qty: 6 TAB | Refills: 0 | Status: SHIPPED | OUTPATIENT
Start: 2019-06-04 | End: 2019-06-04 | Stop reason: ALTCHOICE

## 2019-06-04 RX ORDER — DOXYCYCLINE 100 MG/1
100 TABLET ORAL 2 TIMES DAILY
Qty: 14 TAB | Refills: 0 | Status: SHIPPED | OUTPATIENT
Start: 2019-06-04 | End: 2019-06-11

## 2019-06-04 NOTE — TELEPHONE ENCOUNTER
Alternated medication sent to pharmacy. Doxycycline 100 mg BID for 7 days.     Millie Clayton MD  PGY-3 Family Medicine Resident

## 2019-06-04 NOTE — PROGRESS NOTES
1. Have you been to the ER, urgent care clinic since your last visit? Hospitalized since your last visit? No    2. Have you seen or consulted any other health care providers outside of the [address] [place] [address] since your last visit? Include any pap smears or colon screening.  No  Reviewed record in preparation for visit and have necessary documentation  Pt did not bring medication to office visit for review    Goals that were addressed and/or need to be completed during or after this appointment include   Health Maintenance Due   Topic Date Due    Shingrix Vaccine Age 50> (1 of 2) 11/06/2000    Pneumococcal 65+ years (1 of 2 - PCV13) 11/06/2015    HEMOGLOBIN A1C Q6M  12/20/2017

## 2019-06-04 NOTE — PATIENT INSTRUCTIONS
Pneumonia: Care Instructions  Your Care Instructions    Pneumonia is an infection of the lungs. Most cases are caused by infections from bacteria or viruses. Pneumonia may be mild or very severe. If it is caused by bacteria, you will be treated with antibiotics. It may take a few weeks to a few months to recover fully from pneumonia, depending on how sick you were and whether your overall health is good. Follow-up care is a key part of your treatment and safety. Be sure to make and go to all appointments, and call your doctor if you are having problems. It's also a good idea to know your test results and keep a list of the medicines you take. How can you care for yourself at home? · Take your antibiotics exactly as directed. Do not stop taking the medicine just because you are feeling better. You need to take the full course of antibiotics. · Take your medicines exactly as prescribed. Call your doctor if you think you are having a problem with your medicine. · Get plenty of rest and sleep. You may feel weak and tired for a while, but your energy level will improve with time. · To prevent dehydration, drink plenty of fluids, enough so that your urine is light yellow or clear like water. Choose water and other caffeine-free clear liquids until you feel better. If you have kidney, heart, or liver disease and have to limit fluids, talk with your doctor before you increase the amount of fluids you drink. · Take care of your cough so you can rest. A cough that brings up mucus from your lungs is common with pneumonia. It is one way your body gets rid of the infection. But if coughing keeps you from resting or causes severe fatigue and chest-wall pain, talk to your doctor. He or she may suggest that you take a medicine to reduce the cough. · Use a vaporizer or humidifier to add moisture to your bedroom. Follow the directions for cleaning the machine. · Do not smoke or allow others to smoke around you.  Smoke will make your cough last longer. If you need help quitting, talk to your doctor about stop-smoking programs and medicines. These can increase your chances of quitting for good. · Take an over-the-counter pain medicine, such as acetaminophen (Tylenol), ibuprofen (Advil, Motrin), or naproxen (Aleve). Read and follow all instructions on the label. · Do not take two or more pain medicines at the same time unless the doctor told you to. Many pain medicines have acetaminophen, which is Tylenol. Too much acetaminophen (Tylenol) can be harmful. · If you were given a spirometer to measure how well your lungs are working, use it as instructed. This can help your doctor tell how your recovery is going. · To prevent pneumonia in the future, talk to your doctor about getting a flu vaccine (once a year) and a pneumococcal vaccine (one time only for most people). When should you call for help? Call 911 anytime you think you may need emergency care. For example, call if:    · You have severe trouble breathing.    Call your doctor now or seek immediate medical care if:    · You cough up dark brown or bloody mucus (sputum).     · You have new or worse trouble breathing.     · You are dizzy or lightheaded, or you feel like you may faint.    Watch closely for changes in your health, and be sure to contact your doctor if:    · You have a new or higher fever.     · You are coughing more deeply or more often.     · You are not getting better after 2 days (48 hours).     · You do not get better as expected. Where can you learn more? Go to http://joseph-joel.info/. Enter [id number] in the search box to learn more about \"Pneumonia: Care Instructions. \"  Current as of: September 5, 2018  Content Version: 11.9  © 5543-8789 [employer]. Care instructions adapted under license by [employer] (which disclaims liability or warranty for this information).  If you have questions about a medical condition or this instruction, always ask your healthcare professional. [place] [address] any warranty or liability for your use of this information.

## 2019-06-04 NOTE — PROGRESS NOTES
[facility]     Outpatient Resident Progress Note    Encounter Date: 6/4/2019    Chief Complaint   Patient presents with    Cold Symptoms     cough       History of Present Illness    Patient is a 76 y.o. female, who presents to clinic for Cold Symptoms (cough)  Patient woke up this morning at about 2 AM with persistent cough productive of a clear/whitish sputum. She took some OTC cough medication with no improvement. The cough is worse when laying down. She had some SOB along with the cough. Denies rash, fever, fatigue, dizziness, lightheadedness, headaches, changes in vision, sore throat, CP, palpitations, abdominal pain or tenderness, nausea, vomiting, dysuria, hematuria, diarrhea, melena, hematochezia, leg swelling, or any other complains at this moment. Review of Systems    A complete ROS was reviewed and only pertinent items documented on HPI. Allergies - reviewed: Allergies   Allergen Reactions    Statins-Hmg-Coa Reductase Inhibitors Other (comments)    Crestor [Rosuvastatin] Myalgia    Lipitor [Atorvastatin] Myalgia    Sulfa (Sulfonamide Antibiotics) Hives     Other reaction(s): unknown    Tetanus Immune Globulin Unknown (comments)    Tetanus Vaccines And Toxoid Other (comments)     Local reaction--arm red and swollen         Medications - reviewed:   Current Outpatient Medications   Medication Sig    cloNIDine HCl (CATAPRES) 0.2 mg tablet Take 1 Tab by mouth two (2) times a day.  FLUoxetine (PROZAC) 10 mg capsule Take 1 Cap by mouth daily.  melatonin 5 mg cap capsule Take 2 Caps by mouth nightly.  amLODIPine (NORVASC) 10 mg tablet Take 1 Tab by mouth daily.  digoxin (LANOXIN) 0.125 mg tablet TAKE ONE TABLET BY MOUTH EVERY DAY    ferrous sulfate 325 mg (65 mg iron) tablet Take 1 Tab by mouth three (3) times daily (with meals).     miscellaneous medical supply (BLOOD PRESSURE CUFF) misc Use to take blood pressure as recommended by MD    magnesium oxide (MAG-OX) 400 mg tablet Take 400 mg by mouth daily.  Blood-Glucose Meter (BLOOD GLUCOSE MONITORING) monitoring kit Check BS daily    cholecalciferol, vitamin d3, (VITAMIN D) 1,000 unit tablet Take 1,000 Units by mouth daily.  PV W-O MAGDIEL/FERROUS FUMARATE/FA (M-VIT PO) Take 1 Tab by mouth daily.  ASCORBIC ACID (VITAMIN C PO) Take 1 Tab by mouth daily.  VITAMIN E ACETATE (VITAMIN E PO) Take 1 Tab by mouth daily. No current facility-administered medications for this visit. Past Medical History - reviewed:  Past Medical History:   Diagnosis Date    Arthritis     Depression     Diabetes ([address]     Hypercholesterolemia     Hypertension     [facility]     Sleep apnea     Status post gastric banding        Family Medical History - reviewed:  Family History   Problem Relation Age of Onset    Diabetes Mother     Hypertension Mother     Heart Disease Mother     Stroke Mother     Hypertension Father     Stroke Father     Arthritis-rheumatoid Father     Alcohol abuse Paternal Grandfather     Asthma Neg Hx     Bleeding Prob Neg Hx     Cancer Neg Hx     Elevated Lipids Neg Hx     Headache Neg Hx     Lung Disease Neg Hx     Migraines Neg Hx     Psychiatric Disorder Neg Hx     Mental Retardation Neg Hx        Objective  Visit Vitals  /64 (BP 1 Location: Left arm, BP Patient Position: Sitting)   Pulse 79   Temp 99.8 °F (37.7 °C) (Oral)   Resp 20   Ht 5' 8\" (1.727 m)   Wt 175 lb (79.4 kg)   SpO2 90%   BMI 26.61 kg/m²     Body mass index is 26.61 kg/m². Nursing note and vitals reviewed. Physical Exam  Constitutional: Well-developed, well-nourished, and in no distress. HENT:   Head: Normocephalic and atraumatic. Right Ear: External ear normal. Left Ear: External ear normal.   Nose: Nose normal.   Mouth/Throat: Oropharyngeal erythema, no exudate.   Eyes: Conjunctivae and EOM are normal. Pupils are equal, round, and reactive to light. Right eye exhibits no discharge. Left eye exhibits no discharge. No scleral icterus. Neck: Normal range of motion. Neck supple. No JVD present. No tracheal deviation present. No thyromegaly present. Lymphadenopathy: No cervical adenopathy. Cardiovascular: Normal rate, regular rhythm, normal heart sounds and intact distal pulses. Exam reveals no gallop and no friction rub. No murmur heard. Pulmonary/Chest: Effort normal and breath sounds normal with inspiratory rhonchi on Right lung base. No respiratory distress. No wheezes, no rales. Neurological: Alert and oriented to person, place, and time. Normal reflexes. No cranial nerve deficit. Gait normal. Coordination normal.   Skin: Skin is warm and dry. No rash noted. Not diaphoretic. No erythema. No pallor. Psychiatric: Mood, memory, affect and judgment normal.     XR Results (most recent):  Results from Appointment encounter on 06/04/19   XR CHEST PA LAT    Narrative Indication: Persistent cough and rhonchi on physical exam.    Exam: PA and lateral views of the chest.    There is no prior study for direct comparison. Findings: Cardiomediastinal silhouette is within normal limits. There is patchy  airspace disease within the right middle lobe, right upper lobe and to a lesser  degree the right lower lobe. There is also mild left basilar atelectasis. There  is no pleural fluid. There is no pneumothorax. Impression IMPRESSION: Multifocal airspace disease, consistent with pneumonia. Recommend  short interval follow-up to confirm complete resolution. Assessment / Plan   Ms. Cornelio Pardo is a 76 y.o. female with the following medical condition(s):    1. Community acquired pneumonia of right lower lobe of lung ([facility]  Presentation and CXR consistent with R lung pneumonia. Will treat with Doxycycline due to possible toxicity of coadministration of macrolides and digoxin. Follow up in 1 week.  Recommended to control fevers and symptoms with Tylenol. Recommended proper hydration.   - doxycycline (ADOXA) 100 mg tablet; Take 1 Tab by mouth two (2) times a day for 7 days. Dispense: 14 Tab; Refill: 0  - XR CHEST PA LAT; Future      Patient was advised to return to clinic in case of worsening of symptoms or fail to improve. Life threatening signs and symptoms were discussed and patient advised to go to the nearest ED for prompt evaluation and care in case of onset of any of these. Follow-up and Dispositions    · Return in about 1 week (around 6/11/2019) for F/U Pneumonia. · I have discussed the diagnosis with the patient and the intended plan as seen in the above orders. The patient has received an after-visit summary and questions were answered concerning future plans. I have discussed medication side effects and warnings with the patient as well.       Patient/Plan discussed with Dr. Cole Olivares (Attending Physician)      Estelle Musa MD  PGY-3 Family Medicine Resident  Encounter Date: 6/4/2019

## 2019-06-04 NOTE — TELEPHONE ENCOUNTER
Pharmacy called to report Zithromax and digoxin contraindicated   Can increase toxicity of digoxin  Please advise

## 2019-06-11 ENCOUNTER — OFFICE VISIT (OUTPATIENT)
Dept: FAMILY MEDICINE CLINIC | Age: 69
End: 2019-06-11

## 2019-06-11 VITALS
OXYGEN SATURATION: 94 % | HEART RATE: 43 BPM | SYSTOLIC BLOOD PRESSURE: 113 MMHG | DIASTOLIC BLOOD PRESSURE: 67 MMHG | BODY MASS INDEX: 25.91 KG/M2 | HEIGHT: 68 IN | WEIGHT: 171 LBS | TEMPERATURE: 97.3 F | RESPIRATION RATE: 16 BRPM

## 2019-06-11 DIAGNOSIS — I10 ESSENTIAL HYPERTENSION: ICD-10-CM

## 2019-06-11 DIAGNOSIS — J18.9 COMMUNITY ACQUIRED PNEUMONIA OF RIGHT LOWER LOBE OF LUNG: Primary | ICD-10-CM

## 2019-06-11 DIAGNOSIS — F33.41 RECURRENT MAJOR DEPRESSIVE DISORDER, IN PARTIAL REMISSION (HCC): ICD-10-CM

## 2019-06-11 DIAGNOSIS — I48.0 PAF (PAROXYSMAL ATRIAL FIBRILLATION) (HCC): ICD-10-CM

## 2019-06-11 RX ORDER — FLUOXETINE 10 MG/1
10 CAPSULE ORAL DAILY
Qty: 90 CAP | Refills: 0 | Status: SHIPPED | OUTPATIENT
Start: 2019-06-11 | End: 2019-09-12 | Stop reason: SDUPTHER

## 2019-06-11 RX ORDER — AMLODIPINE BESYLATE 10 MG/1
10 TABLET ORAL DAILY
Qty: 90 TAB | Refills: 3 | Status: SHIPPED | OUTPATIENT
Start: 2019-06-11 | End: 2020-01-16 | Stop reason: SDUPTHER

## 2019-06-11 RX ORDER — DIGOXIN 125 MCG
TABLET ORAL
Qty: 90 TAB | Refills: 3 | Status: SHIPPED | OUTPATIENT
Start: 2019-06-11 | End: 2020-01-16 | Stop reason: SDUPTHER

## 2019-06-11 RX ORDER — CLONIDINE HYDROCHLORIDE 0.2 MG/1
0.2 TABLET ORAL 2 TIMES DAILY
Qty: 180 TAB | Refills: 3 | Status: SHIPPED | OUTPATIENT
Start: 2019-06-11 | End: 2020-01-16 | Stop reason: SDUPTHER

## 2019-06-11 NOTE — PROGRESS NOTES
1. Have you been to the ER, urgent care clinic since your last visit? Hospitalized since your last visit? no    2. Have you seen or consulted any other health care providers outside of the [address] [place] [address] since your last visit? Include any pap smears or colon screening. no  Reviewed record in preparation for visit and have obtained necessary documentation. Patient did not bring medications to visit for review. Information provided on Advanced Directive, Living Will. Body mass index is 26 kg/m². Health Maintenance Due   Topic Date Due    Shingrix Vaccine Age 49> (1 of 2) 11/06/2000    Pneumococcal 65+ years (1 of 2 - PCV13) 11/06/2015    HEMOGLOBIN A1C Q6M  12/20/2017     - check for functional glucose monitor and record keeping system-yes  Pt was given BS record log to document home readings and return to office for review  Diabetic Bundle:  LDL-97  A1C-5.7  BP-1143/67  Smoking?no  Anticoagulation medication? no  Eye exam dilated?   Foot exam?

## 2019-06-11 NOTE — PROGRESS NOTES
[facility]     Outpatient Resident Progress Note    Encounter Date: 6/11/2019    Chief Complaint   Patient presents with    Follow-up     pneumonia       History of Present Illness    Patient is a 76 y.o. female, who presents to clinic for Follow-up (pneumonia)  Patient comes to follow up on CAP as diagnosed last week. She was treated with Doxycycline for 7 days. Today she reports feeling \"100% better\". She denies any symptoms and reports no complains. Denies rash, fever, fatigue, dizziness, lightheadedness, headaches, changes in vision, cough, sore throat, CP, SOB, sputum production, abdominal pain or tenderness, nausea, vomiting, dysuria, hematuria, diarrhea, melena, hematochezia, leg swelling, or any other complains at this moment. Patient requests refill for chronic conditions medications to be sent to [facility] for mail service. Review of Systems    A complete ROS was reviewed and only pertinent items documented on HPI. Allergies - reviewed: Allergies   Allergen Reactions    Statins-Hmg-Coa Reductase Inhibitors Other (comments)    Crestor [Rosuvastatin] Myalgia    Lipitor [Atorvastatin] Myalgia    Sulfa (Sulfonamide Antibiotics) Hives     Other reaction(s): unknown    Tetanus Immune Globulin Unknown (comments)    Tetanus Vaccines And Toxoid Other (comments)     Local reaction--arm red and swollen         Medications - reviewed:   Current Outpatient Medications   Medication Sig    cloNIDine HCl (CATAPRES) 0.2 mg tablet Take 1 Tab by mouth two (2) times a day.  amLODIPine (NORVASC) 10 mg tablet Take 1 Tab by mouth daily.  digoxin (LANOXIN) 0.125 mg tablet TAKE ONE TABLET BY MOUTH EVERY DAY    FLUoxetine (PROZAC) 10 mg capsule Take 1 Cap by mouth daily.  melatonin 5 mg cap capsule Take 2 Caps by mouth nightly.  ferrous sulfate 325 mg (65 mg iron) tablet Take 1 Tab by mouth three (3) times daily (with meals). (Patient taking differently: Take 325 mg by mouth daily.)    miscellaneous medical supply (BLOOD PRESSURE CUFF) misc Use to take blood pressure as recommended by MD    magnesium oxide (MAG-OX) 400 mg tablet Take 400 mg by mouth daily.  Blood-Glucose Meter (BLOOD GLUCOSE MONITORING) monitoring kit Check BS daily    cholecalciferol, vitamin d3, (VITAMIN D) 1,000 unit tablet Take 1,000 Units by mouth daily.  PV W-O MAGDIEL/FERROUS FUMARATE/FA (M-VIT PO) Take 1 Tab by mouth daily.  ASCORBIC ACID (VITAMIN C PO) Take 1 Tab by mouth daily.  VITAMIN E ACETATE (VITAMIN E PO) Take 1 Tab by mouth daily.  doxycycline (ADOXA) 100 mg tablet Take 1 Tab by mouth two (2) times a day for 7 days. No current facility-administered medications for this visit. Past Medical History - reviewed:  Past Medical History:   Diagnosis Date    Arthritis     Depression     Diabetes [facility]     Hypercholesterolemia     Hypertension     [facility]ox[facility])     Sleep apnea     Status post gastric banding        Family Medical History - reviewed:  Family History   Problem Relation Age of Onset    Diabetes Mother     Hypertension Mother     Heart Disease Mother     Stroke Mother     Hypertension Father     Stroke Father     Arthritis-rheumatoid Father     Alcohol abuse Paternal Grandfather     Asthma Neg Hx     Bleeding Prob Neg Hx     Cancer Neg Hx     Elevated Lipids Neg Hx     Headache Neg Hx     Lung Disease Neg Hx     Migraines Neg Hx     Psychiatric Disorder Neg Hx     Mental Retardation Neg Hx        Objective  Visit Vitals  /67 (BP 1 Location: Left arm, BP Patient Position: Sitting)   Pulse (!) 43   Temp 97.3 °F (36.3 °C) (Oral)   Resp 16   Ht 5' 8\" (1.727 m)   Wt 171 lb (77.6 kg)   SpO2 94%   BMI 26.00 kg/m²     Body mass index is 26 kg/m². Nursing note and vitals reviewed. Physical Exam  Constitutional: Well-developed, well-nourished, and in no distress.   HENT: Head: Normocephalic and atraumatic. Right Ear: External ear normal. Left Ear: External ear normal.   Nose: Nose normal.   Mouth/Throat: Oropharynx is clear and moist. No oropharyngeal erythema, no exudate. Eyes: Conjunctivae and EOM are normal. Pupils are equal, round, and reactive to light. Right eye exhibits no discharge. Left eye exhibits no discharge. No scleral icterus. Neck: Normal range of motion. Neck supple. No JVD present. No tracheal deviation present. No thyromegaly present. Lymphadenopathy: No cervical adenopathy. Cardiovascular: Normal rate, regular rhythm, normal heart sounds and intact distal pulses. Exam reveals no gallop and no friction rub. No murmur heard. Pulmonary/Chest: Effort normal and breath sounds normal. No respiratory distress. No wheezes, no rales, no tenderness. Neurological: Alert and oriented. No focal abnormalities. Skin: Skin is warm and dry. No rash noted. Not diaphoretic. No erythema. No pallor. Psychiatric: Mood, memory, affect and judgment normal.     Assessment / Plan   Ms. Davon Garcia is a 76 y.o. female with the following medical condition(s):    1. Community acquired pneumonia of right lower lobe of lung ([address]  Resolved. Recommended to continue seasonal allergies management. 2. PAF (paroxysmal atrial fibrillation) (HCC)  Stable. Refills provided. - digoxin (LANOXIN) 0.125 mg tablet; TAKE ONE TABLET BY MOUTH EVERY DAY  Dispense: 90 Tab; Refill: 3    3. Recurrent major depressive disorder, in partial remission (HCC)  Stable. Refills provided. - FLUoxetine (PROZAC) 10 mg capsule; Take 1 Cap by mouth daily. Dispense: 90 Cap; Refill: 0    4. Essential hypertension  Stable. Refills provided. - cloNIDine HCl (CATAPRES) 0.2 mg tablet; Take 1 Tab by mouth two (2) times a day. Dispense: 180 Tab; Refill: 3  - amLODIPine (NORVASC) 10 mg tablet; Take 1 Tab by mouth daily. Dispense: 90 Tab;  Refill: 3      Patient was advised to return to clinic in case of worsening of symptoms or fail to improve. Life threatening signs and symptoms were discussed and patient advised to go to the nearest ED for prompt evaluation and care in case of onset of any of these. Follow-up and Dispositions    · Return in about 3 months (around 9/11/2019) for Follow up Chronic Conditions. · I have discussed the diagnosis with the patient and the intended plan as seen in the above orders. The patient has received an after-visit summary and questions were answered concerning future plans. I have discussed medication side effects and warnings with the patient as well.       Patient/Plan discussed with Dr. Mary Anne Pond (Attending Physician)      Estefani Rogers MD  PGY-3 Family Medicine Resident  Encounter Date: 6/11/2019

## 2019-06-11 NOTE — PATIENT INSTRUCTIONS
A Healthy Lifestyle: Care Instructions Your Care Instructions A healthy lifestyle can help you feel good, stay at a healthy weight, and have plenty of energy for both work and play. A healthy lifestyle is something you can share with your whole family. A healthy lifestyle also can lower your risk for serious health problems, such as high blood pressure, heart disease, and diabetes. You can follow a few steps listed below to improve your health and the health of your family. Follow-up care is a key part of your treatment and safety. Be sure to make and go to all appointments, and call your doctor if you are having problems. It's also a good idea to know your test results and keep a list of the medicines you take. How can you care for yourself at home? · Do not eat too much sugar, fat, or fast foods. You can still have dessert and treats now and then. The goal is moderation. · Start small to improve your eating habits. Pay attention to portion sizes, drink less juice and soda pop, and eat more fruits and vegetables. ? Eat a healthy amount of food. A 3-ounce serving of meat, for example, is about the size of a deck of cards. Fill the rest of your plate with vegetables and whole grains. ? Limit the amount of soda and sports drinks you have every day. Drink more water when you are thirsty. ? Eat at least 5 servings of fruits and vegetables every day. It may seem like a lot, but it is not hard to reach this goal. A serving or helping is 1 piece of fruit, 1 cup of vegetables, or 2 cups of leafy, raw vegetables. Have an apple or some carrot sticks as an afternoon snack instead of a candy bar. Try to have fruits and/or vegetables at every meal. 
· Make exercise part of your daily routine. You may want to start with simple activities, such as walking, bicycling, or slow swimming. Try to be active 30 to 60 minutes every day.  You do not need to do all 30 to 60 minutes all at once. For example, you can exercise 3 times a day for 10 or 20 minutes. Moderate exercise is safe for most people, but it is always a good idea to talk to your doctor before starting an exercise program. 
· Keep moving. Keri Goltz the lawn, work in the garden, or [employer]. Take the stairs instead of the elevator at work. · If you smoke, quit. People who smoke have an increased risk for heart attack, stroke, cancer, and other lung illnesses. Quitting is hard, but there are ways to boost your chance of quitting tobacco for good. ? Use nicotine gum, patches, or lozenges. ? Ask your doctor about stop-smoking programs and medicines. ? Keep trying. In addition to reducing your risk of diseases in the future, you will notice some benefits soon after you stop using tobacco. If you have shortness of breath or asthma symptoms, they will likely get better within a few weeks after you quit. · Limit how much alcohol you drink. Moderate amounts of alcohol (up to 2 drinks a day for men, 1 drink a day for women) are okay. But drinking too much can lead to liver problems, high blood pressure, and other health problems. Family health If you have a family, there are many things you can do together to improve your health. · Eat meals together as a family as often as possible. · Eat healthy foods. This includes fruits, vegetables, lean meats and dairy, and whole grains. · Include your family in your fitness plan. Most people think of activities such as jogging or tennis as the way to fitness, but there are many ways you and your family can be more active. Anything that makes you breathe hard and gets your heart pumping is exercise. Here are some tips: 
? Walk to do errands or to take your child to school or the bus. 
? Go for a family bike ride after dinner instead of watching TV. Where can you learn more? Go to http://joseph-joel.info/. Enter Q585 in the search box to learn more about \"A Healthy Lifestyle: Care Instructions. \" Current as of: September 11, 2018 Content Version: 11.9 © 7106-6407 [employer]. Care instructions adapted under license by [employer] (which disclaims liability or warranty for this information). If you have questions about a medical condition or this instruction, always ask your healthcare professional. [place] [address] any warranty or liability for your use of this information.

## 2019-09-12 ENCOUNTER — OFFICE VISIT (OUTPATIENT)
Dept: FAMILY MEDICINE CLINIC | Age: 69
End: 2019-09-12

## 2019-09-12 VITALS
DIASTOLIC BLOOD PRESSURE: 64 MMHG | TEMPERATURE: 98.6 F | BODY MASS INDEX: 25.61 KG/M2 | OXYGEN SATURATION: 97 % | WEIGHT: 169 LBS | SYSTOLIC BLOOD PRESSURE: 134 MMHG | HEIGHT: 68 IN | RESPIRATION RATE: 18 BRPM | HEART RATE: 64 BPM

## 2019-09-12 DIAGNOSIS — I48.0 PAF (PAROXYSMAL ATRIAL FIBRILLATION) (HCC): ICD-10-CM

## 2019-09-12 DIAGNOSIS — G47.30 SLEEP APNEA, UNSPECIFIED TYPE: ICD-10-CM

## 2019-09-12 DIAGNOSIS — F33.41 RECURRENT MAJOR DEPRESSIVE DISORDER, IN PARTIAL REMISSION (HCC): Primary | ICD-10-CM

## 2019-09-12 DIAGNOSIS — I10 ESSENTIAL HYPERTENSION: ICD-10-CM

## 2019-09-12 RX ORDER — FLUOXETINE 10 MG/1
10 CAPSULE ORAL DAILY
Qty: 90 CAP | Refills: 3 | Status: SHIPPED | OUTPATIENT
Start: 2019-09-12 | End: 2020-01-16 | Stop reason: SDUPTHER

## 2019-09-12 NOTE — PROGRESS NOTES
1. Have you been to the ER, urgent care clinic since your last visit? Hospitalized since your last visit? No    2. Have you seen or consulted any other health care providers outside of the [address] [place] [address] since your last visit? Include any pap smears or colon screening.  No  Reviewed record in preparation for visit and have necessary documentation  Pt did not bring medication to office visit for review  Information was given to pt on Advanced Directives, Living Will  Information was given on Shingles Vaccine  opportunity was given for questions  Goals that were addressed and/or need to be completed during or after this appointment include     Health Maintenance Due   Topic Date Due    Shingrix Vaccine Age 50> (1 of 2) 11/06/2000    Pneumococcal 65+ years (1 of 2 - PCV13) 11/06/2015    HEMOGLOBIN A1C Q6M  12/20/2017    Influenza Age 9 to Adult  08/01/2019

## 2019-09-12 NOTE — PROGRESS NOTES
Progress Note    Patient: Jose Maldonado MRN: [id number]  SSN: [id number]    YOB: 1950  Age: 76 y.o. Sex: female        Chief Complaint   Patient presents with    Medication Refill    Follow Up Chronic Condition         Subjective:     Problems addressed:  Encounter Diagnoses     ICD-10-CM ICD-9-CM   1. Essential hypertension I10 401.9   2. Recurrent major depressive disorder, in partial remission ([address] F33.41 296.35   3. Sleep apnea, unspecified type G47.30 780.57   4. PAF (paroxysmal atrial fibrillation) ([facility]) I48.0 427.31     75 y/o F with PMH of HTN, a fib, T2DM, and XIMENA presents for medication refill. Pt requests refill of Prozac, states she is doing \"really well\" on this medication and dose, was on higher dose previously. Denies any side effects, depression, feeling down, or loss of interest in activities. Pt does not want flu shot yet, will get later in the year, says she has had 2 pneumococcal shots since age 72, and has had shingles vaccine before. Pt wears CPAP nightly for XIMENA. Says \"I feel well\" and denies any fever, chills, chest pain, palpitations, or other complaints. Occasional ibuprofen for arthritis     Current and past medical information:    Current Medications after this visit[de-identified]     Current Outpatient Medications   Medication Sig    FLUoxetine (PROZAC) 10 mg capsule Take 1 Cap by mouth daily.  cloNIDine HCl (CATAPRES) 0.2 mg tablet Take 1 Tab by mouth two (2) times a day.  amLODIPine (NORVASC) 10 mg tablet Take 1 Tab by mouth daily.  digoxin (LANOXIN) 0.125 mg tablet TAKE ONE TABLET BY MOUTH EVERY DAY    melatonin 5 mg cap capsule Take 2 Caps by mouth nightly.  ferrous sulfate 325 mg (65 mg iron) tablet Take 1 Tab by mouth three (3) times daily (with meals).  (Patient taking differently: Take 325 mg by mouth daily.)    miscellaneous medical supply (BLOOD PRESSURE CUFF) misc Use to take blood pressure as recommended by MD    magnesium oxide (MAG-OX) 400 mg tablet Take 400 mg by mouth daily.  Blood-Glucose Meter (BLOOD GLUCOSE MONITORING) monitoring kit Check BS daily    cholecalciferol, vitamin d3, (VITAMIN D) 1,000 unit tablet Take 1,000 Units by mouth daily.  PV W-O MAGDIEL/FERROUS FUMARATE/FA (M-VIT PO) Take 1 Tab by mouth daily.  ASCORBIC ACID (VITAMIN C PO) Take 1 Tab by mouth daily.  VITAMIN E ACETATE (VITAMIN E PO) Take 1 Tab by mouth daily. No current facility-administered medications for this visit.         Patient Active Problem List    Diagnosis Date Noted    Fracture of unspecified bones 02/12/2019    Irregular heart rhythm 02/12/2019    Drug-induced constipation 02/12/2019    Primary insomnia 02/12/2019    Iron deficiency anemia 01/24/2019    Type 2 diabetes with nephropathy ([facility] 01/16/2019    Clinical depression 01/10/2019    Glucose intolerance (impaired glucose tolerance) 11/01/2010    Hypertension     Hypercholesterolemia     Diabetes ([facility]     Sleep apnea     [facility]ox[facility]) ([facility])     Status post gastric banding        Past Medical History:   Diagnosis Date    Arthritis     Depression     Diabetes ([facility] [facility]     Hypercholesterolemia     Hypertension     PAF (paroxysmal atrial fibrillation) (HCC)     Sleep apnea     Status post gastric banding        Allergies   Allergen Reactions    Statins-Hmg-Coa Reductase Inhibitors Other (comments)    Crestor [Rosuvastatin] Myalgia    Lipitor [Atorvastatin] Myalgia    Sulfa (Sulfonamide Antibiotics) Hives     Other reaction(s): unknown    Tetanus Immune Globulin Unknown (comments)    Tetanus Vaccines And Toxoid Other (comments)     Local reaction--arm red and swollen         Past Surgical History:   Procedure Laterality Date    CARDIAC CATHETERIZATION      HX APPENDECTOMY      HX GI  2006    lap band    HX HYSTERECTOMY  1995       Social History     Socioeconomic History    Marital status: SINGLE     Spouse name: Not on file    Number of children: Not on file    Years of education: Not on file    Highest education level: Not on file   Tobacco Use    Smoking status: Never Smoker    Smokeless tobacco: Never Used   Substance and Sexual Activity    Alcohol use: No    Drug use: No    Sexual activity: Never         Review of Systems   Constitutional: Negative for chills and fever. Respiratory: Negative for cough and shortness of breath. Cardiovascular: Negative for chest pain and palpitations. Gastrointestinal: Negative for abdominal pain, constipation and diarrhea. Genitourinary: Negative for dysuria. Musculoskeletal: Negative for falls. Neurological: Negative for loss of consciousness. Objective:     Vitals:    09/12/19 1532   BP: 134/64   Pulse: 64   Resp: 18   Temp: 98.6 °F (37 °C)   TempSrc: Oral   SpO2: 97%   Weight: 169 lb (76.7 kg)   Height: 5' 8\" (1.727 m)      Body mass index is 25.7 kg/m². Physical Exam   Constitutional: She appears well-developed and well-nourished. No distress. HENT:   Head: Normocephalic and atraumatic. Right Ear: External ear normal.   Left Ear: External ear normal.   Mouth/Throat: Oropharynx is clear and moist. No oropharyngeal exudate. Eyes: Pupils are equal, round, and reactive to light. Conjunctivae are normal.   Cardiovascular: Normal rate, regular rhythm and normal heart sounds. No murmur heard. Pulmonary/Chest: Effort normal and breath sounds normal. No respiratory distress. She has no wheezes. Musculoskeletal: Normal range of motion. She exhibits no deformity. Neurological: She is alert. Skin: Skin is warm and dry. No rash noted. She is not diaphoretic. No erythema. Psychiatric: She has a normal mood and affect. Her behavior is normal.   Vitals reviewed.        Health Maintenance Due   Topic Date Due    Shingrix Vaccine Age 49> (1 of 2) 11/06/2000    Pneumococcal 65+ years (1 of 2 - PCV13) 11/06/2015    HEMOGLOBIN A1C Q6M  12/20/2017    Influenza Age 9 to Adult  08/01/2019 Assessment and orders:     Encounter Diagnoses     ICD-10-CM ICD-9-CM   1. Essential hypertension I10 401.9   2. Recurrent major depressive disorder, [facility] F33.41 296.35   3. Sleep apnea, unspecified type G47.30 780.57   4. PAF (paroxysmal atrial fibrillation) ([facility]) I48.0 427.31       75 y/o F doing well on Prozac, needs refill    1. Recurrent major depressive disorder, in partial remission (HCC) - stable, pt doing well on Prozac  -Refilled FLUoxetine (PROZAC) 10 mg capsule; Take 1 Cap by mouth daily. Dispense: 90 Cap; Refill: 3    2. Essential hypertension - stable, /64 today, pt says higher than usual because was talking about her ex with nurse at the time  -Continue Clonidine 0.2mg BID  -Continue Norvasc 10mg daily    3. Sleep apnea, unspecified type - stable  -Continue CPAP nightly    4. PAF (paroxysmal atrial fibrillation) (HCC) - stable  -Continue digoxin 0.125mg daily          Plan of care:  Discussed diagnoses in detail with patient. Medication risks/benefits/side effects discussed with patient. All of the patient's questions were addressed. The patient understands and agrees with our plan of care. The patient knows to call back if they are unsure of or forget any changes we discussed today or if the symptoms change. The patient received an After-Visit Summary which contains VS, orders, medication list and allergy list. This can be used as a \"mini-medical record\" should they have to seek medical care while out of town. Follow-up and Dispositions    · Return in about 3 months (around 12/12/2019) for Chronic Medical Problems. No future appointments.     Signed By: Eve Linda MD     September 12, 2019

## 2019-09-16 NOTE — PROGRESS NOTES
I saw and evaluated pt with resident. I reviewed with the resident the patient's medical history and the resident's history and findings on the physical examination. I discussed assessment and plan with the resident and concur with the findings and plan as documented in the resident's note.     76 y.o. female w PMH of HTN, MDD, XIMENA, pAfib here for f/u  Feels well today  NAD, pleasant  Agree with resident plan for chronic conditions    Morena Santos MD

## 2019-11-12 ENCOUNTER — PATIENT OUTREACH (OUTPATIENT)
Dept: FAMILY MEDICINE CLINIC | Age: 69
End: 2019-11-12

## 2020-01-16 ENCOUNTER — HOSPITAL ENCOUNTER (OUTPATIENT)
Dept: LAB | Age: 70
Discharge: HOME | End: 2020-01-16

## 2020-01-16 ENCOUNTER — OFFICE VISIT (OUTPATIENT)
Dept: FAMILY MEDICINE CLINIC | Age: 70
End: 2020-01-16

## 2020-01-16 VITALS
DIASTOLIC BLOOD PRESSURE: 69 MMHG | SYSTOLIC BLOOD PRESSURE: 135 MMHG | RESPIRATION RATE: 16 BRPM | HEART RATE: 55 BPM | TEMPERATURE: 97.4 F | OXYGEN SATURATION: 97 % | WEIGHT: 166 LBS | BODY MASS INDEX: 25.16 KG/M2 | HEIGHT: 68 IN

## 2020-01-16 DIAGNOSIS — E11.21 TYPE 2 DIABETES WITH NEPHROPATHY (HCC): ICD-10-CM

## 2020-01-16 DIAGNOSIS — E78.00 HYPERCHOLESTEROLEMIA: ICD-10-CM

## 2020-01-16 DIAGNOSIS — I10 ESSENTIAL HYPERTENSION: ICD-10-CM

## 2020-01-16 DIAGNOSIS — F33.41 RECURRENT MAJOR DEPRESSIVE DISORDER, IN PARTIAL REMISSION (HCC): ICD-10-CM

## 2020-01-16 DIAGNOSIS — I48.0 PAF (PAROXYSMAL ATRIAL FIBRILLATION) (HCC): ICD-10-CM

## 2020-01-16 DIAGNOSIS — I10 ESSENTIAL HYPERTENSION: Primary | ICD-10-CM

## 2020-01-16 LAB
ALBUMIN SERPL-MCNC: 3.8 G/DL (ref 3.5–5)
ALBUMIN/GLOB SERPL: 1 {RATIO} (ref 1.1–2.2)
ALP SERPL-CCNC: 92 U/L (ref 45–117)
ALT SERPL-CCNC: 16 U/L (ref 12–78)
ANION GAP SERPL CALC-SCNC: 6 MMOL/L (ref 5–15)
AST SERPL-CCNC: 11 U/L (ref 15–37)
BASOPHILS # BLD: 0.1 K/UL (ref 0–0.1)
BASOPHILS NFR BLD: 1 % (ref 0–1)
BILIRUB SERPL-MCNC: 0.4 MG/DL (ref 0.2–1)
BUN SERPL-MCNC: 22 MG/DL (ref 6–20)
BUN/CREAT SERPL: 23 (ref 12–20)
CALCIUM SERPL-MCNC: 9 MG/DL (ref 8.5–10.1)
CHLORIDE SERPL-SCNC: 106 MMOL/L (ref 97–108)
CHOLEST SERPL-MCNC: 176 MG/DL
CO2 SERPL-SCNC: 28 MMOL/L (ref 21–32)
CREAT SERPL-MCNC: 0.96 MG/DL (ref 0.55–1.02)
DIFFERENTIAL METHOD BLD: ABNORMAL
DIGOXIN SERPL-MCNC: 1.1 NG/ML (ref 0.9–2)
EOSINOPHIL # BLD: 0.5 K/UL (ref 0–0.4)
EOSINOPHIL NFR BLD: 7 % (ref 0–7)
ERYTHROCYTE [DISTWIDTH] IN BLOOD BY AUTOMATED COUNT: 13.6 % (ref 11.5–14.5)
EST. AVERAGE GLUCOSE BLD GHB EST-MCNC: 137 MG/DL
GLOBULIN SER CALC-MCNC: 3.7 G/DL (ref 2–4)
GLUCOSE SERPL-MCNC: 105 MG/DL (ref 65–100)
HBA1C MFR BLD: 6.4 % (ref 4–5.6)
HCT VFR BLD AUTO: 42.9 % (ref 35–47)
HDLC SERPL-MCNC: 50 MG/DL
HDLC SERPL: 3.5 {RATIO} (ref 0–5)
HGB BLD-MCNC: 12.9 G/DL (ref 11.5–16)
IMM GRANULOCYTES # BLD AUTO: 0 K/UL (ref 0–0.04)
IMM GRANULOCYTES NFR BLD AUTO: 0 % (ref 0–0.5)
LDLC SERPL CALC-MCNC: 113.8 MG/DL (ref 0–100)
LIPID PROFILE,FLP: ABNORMAL
LYMPHOCYTES # BLD: 1.7 K/UL (ref 0.8–3.5)
LYMPHOCYTES NFR BLD: 23 % (ref 12–49)
MCH RBC QN AUTO: 27.2 PG (ref 26–34)
MCHC RBC AUTO-ENTMCNC: 30.1 G/DL (ref 30–36.5)
MCV RBC AUTO: 90.5 FL (ref 80–99)
MONOCYTES # BLD: 0.7 K/UL (ref 0–1)
MONOCYTES NFR BLD: 9 % (ref 5–13)
NEUTS SEG # BLD: 4.4 K/UL (ref 1.8–8)
NEUTS SEG NFR BLD: 60 % (ref 32–75)
NRBC # BLD: 0 K/UL (ref 0–0.01)
NRBC BLD-RTO: 0 PER 100 WBC
PLATELET # BLD AUTO: 243 K/UL (ref 150–400)
PMV BLD AUTO: 9.5 FL (ref 8.9–12.9)
POTASSIUM SERPL-SCNC: 4.1 MMOL/L (ref 3.5–5.1)
PROT SERPL-MCNC: 7.5 G/DL (ref 6.4–8.2)
RBC # BLD AUTO: 4.74 M/UL (ref 3.8–5.2)
SODIUM SERPL-SCNC: 140 MMOL/L (ref 136–145)
TRIGL SERPL-MCNC: 61 MG/DL (ref ?–150)
VLDLC SERPL CALC-MCNC: 12.2 MG/DL
WBC # BLD AUTO: 7.4 K/UL (ref 3.6–11)

## 2020-01-16 RX ORDER — DIGOXIN 125 MCG
TABLET ORAL
Qty: 90 TAB | Refills: 1 | Status: SHIPPED | OUTPATIENT
Start: 2020-01-16 | End: 2020-08-06 | Stop reason: SDUPTHER

## 2020-01-16 RX ORDER — AMLODIPINE BESYLATE 10 MG/1
10 TABLET ORAL DAILY
Qty: 90 TAB | Refills: 1 | Status: SHIPPED | OUTPATIENT
Start: 2020-01-16 | End: 2020-08-06 | Stop reason: SDUPTHER

## 2020-01-16 RX ORDER — CLONIDINE HYDROCHLORIDE 0.2 MG/1
0.2 TABLET ORAL 2 TIMES DAILY
Qty: 180 TAB | Refills: 1 | Status: SHIPPED | OUTPATIENT
Start: 2020-01-16 | End: 2020-08-06 | Stop reason: SDUPTHER

## 2020-01-16 RX ORDER — FLUOXETINE 10 MG/1
10 CAPSULE ORAL DAILY
Qty: 90 CAP | Refills: 1 | Status: SHIPPED | OUTPATIENT
Start: 2020-01-16 | End: 2020-08-06 | Stop reason: SDUPTHER

## 2020-01-16 NOTE — PROGRESS NOTES
Progress Note    Patient: Judit Gonzales MRN: [id number]  SSN: [id number]    YOB: 1950  Age: 71 y.o. Sex: female        Chief Complaint   Patient presents with    Hypertension    Medication Refill         Subjective:     Problems addressed:  Encounter Diagnoses     ICD-10-CM ICD-9-CM   1. Essential hypertension I10 401.9   2. PAF (paroxysmal atrial fibrillation) ([facility]) I48.0 427.31   3. Recurrent major depressive disorder, in partial remission ([facility] F33.41 296.35   4. Type 2 diabetes with nephropathy (HCC) E11.21 250.40     583.81   5. Hypercholesterolemia E78.00 272.0       HPI: 71 y.o. y/o female with PMH of HTN, a fib, T2DM, and XIMENA presents for medication refills. Pt reports no problems with any of her medications, requests refills today. Denies any CP, SOB, or palpitations since last clinic visit. Denies any depressions symptoms, is sleeping well, no anhedonia, has a \"good mood\". Pt denies any fever, chills, dizziness, falls, or other complaints at this time. Current and past medical information:    Current Medications after this visit[de-identified]     Current Outpatient Medications   Medication Sig    amLODIPine (NORVASC) 10 mg tablet Take 1 Tab by mouth daily.  cloNIDine HCL (CATAPRES) 0.2 mg tablet Take 1 Tab by mouth two (2) times a day.  digoxin (LANOXIN) 0.125 mg tablet TAKE ONE TABLET BY MOUTH EVERY DAY    FLUoxetine (PROZAC) 10 mg capsule Take 1 Cap by mouth daily.  ferrous sulfate 325 mg (65 mg iron) tablet Take 1 Tab by mouth three (3) times daily (with meals). (Patient taking differently: Take 325 mg by mouth daily.)    magnesium oxide (MAG-OX) 400 mg tablet Take 400 mg by mouth daily.  cholecalciferol, vitamin d3, (VITAMIN D) 1,000 unit tablet Take 1,000 Units by mouth daily.  PV W-O MAGDIEL/FERROUS FUMARATE/FA (M-VIT PO) Take 1 Tab by mouth daily.  ASCORBIC ACID (VITAMIN C PO) Take 1 Tab by mouth daily.  VITAMIN E ACETATE (VITAMIN E PO) Take 1 Tab by mouth daily.  melatonin 5 mg cap capsule Take 2 Caps by mouth nightly.  miscellaneous medical supply (BLOOD PRESSURE CUFF) misc Use to take blood pressure as recommended by MD    Blood-Glucose Meter (BLOOD GLUCOSE MONITORING) monitoring kit Check BS daily     No current facility-administered medications for this visit. Patient Active Problem List    Diagnosis Date Noted    Fracture of unspecified bones 02/12/2019    Irregular heart rhythm 02/12/2019    Drug-induced constipation 02/12/2019    Primary insomnia 02/12/2019    Iron deficiency anemia 01/24/2019    Type 2 diabetes with nephropathy ([facility] 01/16/2019    Clinical depression 01/10/2019    Glucose intolerance (impaired glucose tolerance) 11/01/2010    Hypertension     Hypercholesterolemia     Diabetes (HCC)     Sleep apnea     PAF (paroxysmal atrial fibrillation) (HCC)     Status post gastric banding        Past Medical History:   Diagnosis Date    Arthritis     Depression     Diabetes ([facility]     Hypercholesterolemia     Hypertension     PAF (paroxysmal atrial fibrillation) (HCC)     Sleep apnea     Status post gastric banding        Allergies   Allergen Reactions    Statins-Hmg-Coa Reductase Inhibitors Other (comments)    Crestor [Rosuvastatin] Myalgia    Lipitor [Atorvastatin] Myalgia    Sulfa (Sulfonamide Antibiotics) Hives     Other reaction(s): unknown    Tetanus Immune Globulin Unknown (comments)    Tetanus Vaccines And Toxoid Other (comments)     Local reaction--arm red and swollen         Past Surgical History:   Procedure Laterality Date    CARDIAC CATHETERIZATION      HX APPENDECTOMY      HX GI  2006    lap band    HX HYSTERECTOMY  1995       Social History     Tobacco Use    Smoking status: Never Smoker    Smokeless tobacco: Never Used   Substance Use Topics    Alcohol use: No    Drug use: No         Review of Systems   Constitutional: Negative for chills and fever.    Eyes: Negative for blurred vision and double vision. Respiratory: Negative for cough and shortness of breath. Cardiovascular: Negative for chest pain and palpitations. Musculoskeletal: Negative for falls. Neurological: Negative for dizziness, loss of consciousness and headaches. Objective:     Vitals:    01/16/20 1115   BP: 135/69   Pulse: (!) 55   Resp: 16   Temp: 97.4 °F (36.3 °C)   TempSrc: Oral   SpO2: 97%   Weight: 166 lb (75.3 kg)   Height: 5' 8\" (1.727 m)      Body mass index is 25.24 kg/m². Physical Exam  Vitals signs reviewed. Constitutional:       General: She is not in acute distress. Appearance: Normal appearance. She is not diaphoretic. HENT:      Head: Normocephalic and atraumatic. Right Ear: External ear normal.      Left Ear: External ear normal.      Nose: Nose normal. No rhinorrhea. Mouth/Throat:      Mouth: Mucous membranes are moist.      Pharynx: Oropharynx is clear. Eyes:      Conjunctiva/sclera: Conjunctivae normal.      Pupils: Pupils are equal, round, and reactive to light. Neck:      Musculoskeletal: Neck supple. Cardiovascular:      Rate and Rhythm: Regular rhythm. Bradycardia present. Heart sounds: No murmur. Pulmonary:      Effort: Pulmonary effort is normal. No respiratory distress. Breath sounds: No wheezing or rhonchi. Musculoskeletal:      Right lower leg: No edema. Left lower leg: No edema. Skin:     General: Skin is warm and dry. Findings: No erythema or rash. Neurological:      General: No focal deficit present. Mental Status: She is alert. Psychiatric:         Mood and Affect: Mood normal.         Behavior: Behavior normal.          Health Maintenance Due   Topic Date Due    FOOT EXAM Q1  02/12/2020    MICROALBUMIN Q1  02/12/2020       Assessment and orders:     Encounter Diagnoses     ICD-10-CM ICD-9-CM   1. Essential hypertension I10 401.9   2. PAF (paroxysmal atrial fibrillation) ([facility]) I48.0 427.31   3.  Recurrent major depressive disorder, in partial remission ([facility]) F33.41 296.35   4. Type 2 diabetes with nephropathy ([facility]) E11.21 250.40     583.81   5. Hypercholesterolemia E78.00 272.0       70 y/o F with stable chronic conditions in need of refills and labs    1. Essential hypertension - well controlled  -REFILLED amLODIPine (NORVASC) 10 mg tablet; Take 1 Tab by mouth daily. Dispense: 90 Tab; Refill: 1  -REFILLED cloNIDine HCL (CATAPRES) 0.2 mg tablet; Take 1 Tab by mouth two (2) times a day. Dispense: 180 Tab; Refill: 1  - CBC WITH AUTOMATED DIFF; Future  - METABOLIC PANEL, COMPREHENSIVE; Future    2. PAF (paroxysmal atrial fibrillation) ([facility]) - pt asymptomatic at this time, but with bradycardia down to 55, pt reports is usually in the 60s at home, will check digoxin level, pt adamant that she wants to stay on current digoxin dose, \"I feel fine, I've done well on low digoxin dose for years\"  -REFILLED digoxin (LANOXIN) 0.125 mg tablet; TAKE ONE TABLET BY MOUTH EVERY DAY  Dispense: 90 Tab; Refill: 1  - DIGOXIN; Future  - CBC WITH AUTOMATED DIFF; Future    3. Recurrent major depressive disorder - stable, pt without depressive symptoms at this time, will continue current dose of Prozac  -REFILLED FLUoxetine (PROZAC) 10 mg capsule; Take 1 Cap by mouth daily. Dispense: 90 Cap; Refill: 1    4. Type 2 diabetes with nephropathy ([facility] - Diet controlled, Last A1C 5.7 6/20/17  - HEMOGLOBIN A1C WITH EAG; Future    5. Hypercholesterolemia - last LDL 97 on 1/11/19  - LIPID PANEL; Future      Plan of care:  Discussed diagnoses in detail with patient. Medication risks/benefits/side effects discussed with patient. All of the patient's questions were addressed. The patient understands and agrees with our plan of care. The patient knows to call back if they are unsure of or forget any changes we discussed today or if the symptoms change.      The patient received an After-Visit Summary which contains VS, orders, medication list and allergy list. This can be used as a \"mini-medical record\" should they have to seek medical care while out of town. Follow-up and Dispositions    · Return in about 3 months (around 4/16/2020) for Chronic Medical Problems, Health Maintinence. No future appointments.     Signed By: Raj Cantu MD     January 17, 2020

## 2020-01-16 NOTE — PROGRESS NOTES
I saw and evaluated the patient with the resident, performing the key elements of the exam and service. I discussed the findings, assessment and plan with the resident and agree with the resident's findings and plan as documented in the resident's note. Vernard Persia Maryfrances Dakins, M.D.

## 2020-01-16 NOTE — PROGRESS NOTES
1. Have you been to the ER, urgent care clinic, or been hospitalized since your last visit? No     2. Have you seen or consulted any other health care providers outside of the [address] [place] [address] since your last visit? No     Reviewed record in preparation for visit and have necessary documentation  Goals that were addressed and/or need to be completed during or after this appointment include   Health Maintenance Due   Topic Date Due    DTaP/Tdap/Td series (1 - Tdap) 11/06/1961    Shingrix Vaccine Age 50> (1 of 2) 11/06/2000    Pneumococcal 65+ years (1 of 1 - PPSV23) 11/06/2015    HEMOGLOBIN A1C Q6M  12/20/2017    Influenza Age 5 to Adult  08/01/2019    LIPID PANEL Q1  01/11/2020    FOOT EXAM Q1  02/12/2020    MICROALBUMIN Q1  02/12/2020    MEDICARE YEARLY EXAM  02/13/2020       I have received verbal consent from Estella Donahue to discuss any/all medical information while others present in the room.

## 2020-01-16 NOTE — PATIENT INSTRUCTIONS
Learning About Atrial Fibrillation What is atrial fibrillation? Atrial fibrillation (say \"AY-tree-garcia xaj-[id number]-SGF-shun\") is the most common type of irregular heartbeat (arrhythmia). Normally, the heart beats in a strong, steady rhythm. In atrial fibrillation, a problem with the heart's electrical system causes the two upper parts of the heart (the atria) to quiver, or fibrillate. Your heart rate also may be faster than normal. 
Atrial fibrillation can be dangerous because if the heartbeat isn't strong and steady, blood can collect, or pool, in the atria. And pooled blood is more likely to form clots. Clots can travel to the brain, block blood flow, and cause a stroke. Atrial fibrillation can also lead to heart failure. Treatment for atrial fibrillation helps prevent stroke and heart failure. It also helps relieve symptoms. Atrial fibrillation is often caused by another heart problem. It may happen after heart surgery. It may also be caused by other problems, such as an overactive thyroid gland or lung disease. Many people with atrial fibrillation are able to live full and active lives. What are the symptoms? Some people feel symptoms when they have episodes of atrial fibrillation. But other people don't notice any symptoms. If you have symptoms, you may feel: · A fluttering, racing, or pounding feeling in your chest called palpitations. · Weak or tired. · Dizzy or lightheaded. · Short of breath. · Chest pain. · Confused. You may notice signs of atrial fibrillation when you check your pulse. Your pulse may seem uneven or fast. 
What can you expect when you have atrial fibrillation? At first, spells of atrial fibrillation may come on suddenly and last a short time. It may go away on its own or it goes away after treatment. This is called paroxysmal atrial fibrillation. Over time, the spells may last longer and occur more often. They often don't go away on their own. How is it treated? Treatments can help you feel better and prevent future problems, especially stroke and heart failure. The main types of treatment slow the heart rate, control the heart rhythm, and help prevent stroke. Your treatment will depend on the cause of your atrial fibrillation, your symptoms, and your risk for stroke. · Heart rate treatment. Medicine may be used to slow your heart rate. Your heartbeat may still be irregular. But these medicines keep your heart from beating too fast. They may also help relieve your symptoms. · Heart rhythm treatment. Different treatments may be used to try to stop atrial fibrillation and keep it from returning. They can also relieve symptoms. These treatments include medicine, electrical cardioversion to shock the heart back to a normal rhythm, a procedure called catheter ablation, and heart surgery. · Stroke prevention. You and your doctor can decide how to lower your risk. You may decide to take a blood-thinning medicine called an anticoagulant. How can you live well with it? You can live well and help manage atrial fibrillation by having a heart-healthy lifestyle. This lifestyle may help reduce how often you have episodes of atrial fibrillation. If you are overweight, losing weight can help relieve symptoms. To have a heart-healthy lifestyle: · Don't smoke. · Eat heart-healthy foods. · Be active. Talk to your doctor about what type and level of exercise is safe for you. · Stay at a healthy weight. Lose weight if you need to. · Avoid alcohol if it triggers symptoms. · Manage other health problems such as high blood pressure, high cholesterol, and diabetes. · Avoid getting sick from the flu. Get a flu shot every year. · Manage stress. Where can you learn more? Go to http://joseph-joel.info/. Enter [phone] in the search box to learn more about \"Learning About Atrial Fibrillation. \" Current as of: April 9, 2019 Content Version: 12.2 © 4814-1213 [employer]. Care instructions adapted under license by [employer] (which disclaims liability or warranty for this information). If you have questions about a medical condition or this instruction, always ask your healthcare professional. [address] any warranty or liability for your use of this information.

## 2020-01-17 NOTE — PROGRESS NOTES
Results noted. Renal function stable. Digoxin level higher than before at 1.1, but no clinical signs of digoxin toxicity. Will continue to monitor and ensure next digoxin level check to be at least 6 hours, but preferably 12 hours after prior digoxin dose. LDL now 113.8, pt has failed statin therapy in the past due to side effects. Hemoglobin A1C increased from 5.7. to 6.4.      Speedy Benson MD  PGY2 Family Medicine Resident

## 2020-08-06 ENCOUNTER — VIRTUAL VISIT (OUTPATIENT)
Dept: FAMILY MEDICINE CLINIC | Age: 70
End: 2020-08-06
Payer: MEDICARE

## 2020-08-06 ENCOUNTER — TELEPHONE (OUTPATIENT)
Dept: FAMILY MEDICINE CLINIC | Age: 70
End: 2020-08-06

## 2020-08-06 DIAGNOSIS — E11.21 TYPE 2 DIABETES WITH NEPHROPATHY (HCC): ICD-10-CM

## 2020-08-06 DIAGNOSIS — I48.0 PAF (PAROXYSMAL ATRIAL FIBRILLATION) (HCC): ICD-10-CM

## 2020-08-06 DIAGNOSIS — F33.41 RECURRENT MAJOR DEPRESSIVE DISORDER, IN PARTIAL REMISSION (HCC): ICD-10-CM

## 2020-08-06 DIAGNOSIS — H26.9 CATARACT, UNSPECIFIED CATARACT TYPE, UNSPECIFIED LATERALITY: ICD-10-CM

## 2020-08-06 DIAGNOSIS — I10 ESSENTIAL HYPERTENSION: Primary | ICD-10-CM

## 2020-08-06 PROCEDURE — 99443 PR PHYS/QHP TELEPHONE EVALUATION 21-30 MIN: CPT | Performed by: STUDENT IN AN ORGANIZED HEALTH CARE EDUCATION/TRAINING PROGRAM

## 2020-08-06 RX ORDER — AMLODIPINE BESYLATE 10 MG/1
10 TABLET ORAL DAILY
Qty: 90 TAB | Refills: 1 | Status: SHIPPED | OUTPATIENT
Start: 2020-08-06 | End: 2020-11-10 | Stop reason: SDUPTHER

## 2020-08-06 RX ORDER — FLUOXETINE 10 MG/1
10 CAPSULE ORAL DAILY
Qty: 90 CAP | Refills: 1 | Status: SHIPPED | OUTPATIENT
Start: 2020-08-06 | End: 2020-11-10 | Stop reason: SDUPTHER

## 2020-08-06 RX ORDER — CLONIDINE HYDROCHLORIDE 0.2 MG/1
0.2 TABLET ORAL 2 TIMES DAILY
Qty: 180 TAB | Refills: 1 | Status: SHIPPED | OUTPATIENT
Start: 2020-08-06 | End: 2020-11-10 | Stop reason: SDUPTHER

## 2020-08-06 RX ORDER — DIGOXIN 125 MCG
TABLET ORAL
Qty: 90 TAB | Refills: 1 | Status: SHIPPED | OUTPATIENT
Start: 2020-08-06 | End: 2020-11-10 | Stop reason: SDUPTHER

## 2020-08-06 NOTE — PROGRESS NOTES
Wen Fields  71 y.o. female  1950  [address] [facility]  [id number]    [phone] (home)      [facility]:    Telephone Encounter  Sapna Cheng MD       Encounter Date: 8/6/2020 at 2:06 PM    Consent:  She and/or the health care decision maker is aware that that she may receive a bill for this telephone service, depending on her insurance coverage, and has provided verbal consent to proceed: Yes    Chief Complaint   Patient presents with    Medication Evaluation       History of Present Illness   Wen Fields is a 71 y.o. female was evaluated by synchronous (real-time) audio technology from home, through the phone only. I communicated with the patient and/or health care decision maker about medication refills. Pt says phone doesn't have video capability. Pt requesting refills of her medications that she gets through the mail to make sure she doesn't run out, no current refills available on prescription. Needs refill of medications: amlodipine, clonidine, digoxin, and fluoxetine. Pt states BP has been well controlled, looks like lisinopril/HCTZ discontinued in January 2019 due to dizziness and pt would like to stay on current BP regimen and not change anything right now. Pt reports depression is well controlled, denies any depressed mood, anhedonia, or trouble sleeping or with appetite. Pt denies any palpitations or chest pain. Pt says she has Cataracts and needs to see ophthalmologist for check up. Doesn't want to do another mammogram, says she does self check and will ask for mammogram if she thinks she needs one.        Patient Screening for COVID-19:     1) Patient denies complaints of shortness of breath or difficulty breathing, sore throat,  chills, fatigue, muscle aches, loss of taste or smell, or GI symptoms(nausea, vomiting or diarrhea): Yes    2) Patient denies complaints of cough or fever over 100F: Yes    3) Patient denies leaving the country in the past 14 days or any other recent travel: Yes    4) Patient denies being exposed to anyone with COVID-19 and has not been around any one that has been sick with COVID-19 like symptoms as listed above: Yes     5) Patient informed to wear mask when arriving for appointment: Yes        Review of Systems   Review of Systems   Constitutional: Negative for chills and fever. Eyes: Positive for blurred vision (at night). Negative for double vision. Respiratory: Negative for cough and shortness of breath. Gastrointestinal: Negative for abdominal pain, nausea and vomiting. Musculoskeletal: Negative for myalgias. Skin: Negative for rash. Neurological: Negative for dizziness and headaches. Psychiatric/Behavioral: Negative for depression and suicidal ideas. Vitals/Objective:   General: Patient speaking in complete sentences without effort. Talkative. Normal speech and cooperative. Not coughing. Due to this being a Virtual Check-in/Telephone evaluation, many elements of the physical examination are unable to be assessed. Assessment and Plan:   Time-based coding, delete if not needed: I spent at least 25 minutes with this established patient, and >50% of the time was spent counseling and/or coordinating care regarding medication refills  Total Time: minutes: 21-30 minutes    Malick Cruz is a 71 y.o. female with stable chronic conditions needing refills      1. Essential hypertension - pt encouraged to take home BP and let us know if too high or too low, has home BP cuff but isn't currently using it, most recent clinic readings are good, recommended patient restart ACE or start an ARB for kidney protection due to hx of diabetes, but pt declined saying she does not want to change BP regimen right now  BP Readings from Last 3 Encounters:   01/16/20 135/69   09/12/19 134/64   06/11/19 113/67     -REFILLED amLODIPine (NORVASC) 10 mg tablet; Take 1 Tab by mouth daily. Dispense: 90 Tab;  Refill: 1  -REFILLED cloNIDine HCL (CATAPRES) 0.2 mg tablet; Take 1 Tab by mouth two (2) times a day. Dispense: 180 Tab; Refill: 1    2. Recurrent major depressive disorder, in partial remission (HCC) - stable, pt without symptoms at this time  -REFILLED FLUoxetine (PROzac) 10 mg capsule; Take 1 Cap by mouth daily. Dispense: 90 Cap; Refill: 1    3. PAF (paroxysmal atrial fibrillation) ([facility]) - stable, pt denies any recent symptoms  -REFILLED digoxin (LANOXIN) 0.125 mg tablet; TAKE ONE TABLET BY MOUTH EVERY DAY  Dispense: 90 Tab; Refill: 1    4. Type 2 diabetes with nephropathy ([facility]) - last A1c below, pt will need f/u A1c soon but wants to avoid in person visits for now, diabetes diet controlled  Lab Results   Component Value Date/Time    Hemoglobin A1c 6.4 (H) 01/16/2020 01:04 PM     5. Cataract, unspecified cataract type, unspecified laterality  -Follow up with ophthalmologist         We discussed the expected course, resolution and complications of the diagnosis(es) in detail. Medication risks, benefits, costs, interactions, and alternatives were discussed as indicated. I advised her to contact the office if her condition worsens, changes or fails to improve as anticipated. She expressed understanding with the diagnosis(es) and plan. Patient understands that this encounter was a temporary measure, and the importance of further follow up and examination was emphasized. Patient verbalized understanding. Patient informed to follow up: Follow-up and Dispositions  ·   Return in about 1 month (around 9/6/2020), or if symptoms worsen or fail to improve, for Medicare yearly exam.          I affirm this is a Patient Initiated Episode with an Established Patient who has not had a related appointment within my department in the past 7 days or scheduled within the next 24 hours.   Note: not billable if this call serves to triage the patient into an appointment for the relevant concern      Electronically Signed: Sari Gloria Varun Booker MD  Providers location when delivering service: Home          ICD-10-CM ICD-9-CM    1. Essential hypertension  I10 401.9 amLODIPine (NORVASC) 10 mg tablet      cloNIDine HCL (CATAPRES) 0.2 mg tablet   2. Recurrent major depressive disorder, in partial remission (HCC)  F33.41 296.35 FLUoxetine (PROzac) 10 mg capsule   3. PAF (paroxysmal atrial fibrillation) ([facility])  I48.0 427.31 digoxin (LANOXIN) 0.125 mg tablet   4. Type 2 diabetes with nephropathy ([facility])  E11.21 250.40      583.81    5. Cataract, unspecified cataract type, unspecified laterality  H26.9 366.9        Pursuant to the emergency declaration under the [facility] waiver authority and the [employer] Act, this Virtual  Visit was conducted, with patient's consent, to reduce the patient's risk of exposure to COVID-19 and provide continuity of care for an established patient. History   Patients past medical, surgical and family histories were personally reviewed.     Past Medical History:   Diagnosis Date    Arthritis     Depression     Diabetes ([facility] [address]     Hypercholesterolemia     Hypertension     PAF (paroxysmal atrial fibrillation) ([facility])     Sleep apnea     Status post gastric banding      Past Surgical History:   Procedure Laterality Date    CARDIAC CATHETERIZATION      HX APPENDECTOMY      HX GI  2006    lap band    HX HYSTERECTOMY  1995     Family History   Problem Relation Age of Onset    Diabetes Mother     Hypertension Mother     Heart Disease Mother     Stroke Mother     Hypertension Father     Stroke Father    [facility] Arthritis-rheumatoid Father     Alcohol abuse Paternal Grandfather     Asthma Neg Hx     Bleeding Prob Neg Hx     Cancer Neg Hx     Elevated Lipids Neg Hx     Headache Neg Hx     Lung Disease Neg Hx     Migraines Neg Hx     Psychiatric Disorder Neg Hx     Mental Retardation Neg Hx      Social History Socioeconomic History    Marital status: SINGLE     Spouse name: Not on file    Number of children: Not on file    Years of education: Not on file    Highest education level: Not on file   Occupational History    Not on file   Social Needs    Financial resource strain: Not on file    Food insecurity     Worry: Not on file     Inability: Not on file    Transportation needs     Medical: Not on file     Non-medical: Not on file   Tobacco Use    Smoking status: Never Smoker    Smokeless tobacco: Never Used   Substance and Sexual Activity    Alcohol use: No    Drug use: No    Sexual activity: Never   Lifestyle    Physical activity     Days per week: Not on file     Minutes per session: Not on file    Stress: Not on file   Relationships    Social connections     Talks on phone: Not on file     Gets together: Not on file     Attends Mandaen service: Not on file     Active member of club or organization: Not on file     Attends meetings of clubs or organizations: Not on file     Relationship status: Not on file    Intimate partner violence     Fear of current or ex partner: Not on file     Emotionally abused: Not on file     Physically abused: Not on file     Forced sexual activity: Not on file   Other Topics Concern    Not on file   Social History Narrative    Not on file            Current Medications/Allergies   Medications and Allergies reviewed:    Current Outpatient Medications   Medication Sig Dispense Refill    amLODIPine (NORVASC) 10 mg tablet Take 1 Tab by mouth daily. 90 Tab 1    FLUoxetine (PROzac) 10 mg capsule Take 1 Cap by mouth daily. 90 Cap 1    digoxin (LANOXIN) 0.125 mg tablet TAKE ONE TABLET BY MOUTH EVERY DAY 90 Tab 1    cloNIDine HCL (CATAPRES) 0.2 mg tablet Take 1 Tab by mouth two (2) times a day. 180 Tab 1    melatonin 5 mg cap capsule Take 2 Caps by mouth nightly.  90 Cap 3    ferrous sulfate 325 mg (65 mg iron) tablet Take 1 Tab by mouth three (3) times daily (with meals). (Patient taking differently: Take 325 mg by mouth daily.) 90 Tab 5    miscellaneous medical supply (BLOOD PRESSURE CUFF) misc Use to take blood pressure as recommended by MD 1 Each 0    magnesium oxide (MAG-OX) 400 mg tablet Take 400 mg by mouth daily.  Blood-Glucose Meter (BLOOD GLUCOSE MONITORING) monitoring kit Check BS daily 1 Kit 0    cholecalciferol, vitamin d3, (VITAMIN D) 1,000 unit tablet Take 1,000 Units by mouth daily.  PV W-O MAGDIEL/FERROUS FUMARATE/FA (M-VIT PO) Take 1 Tab by mouth daily.  ASCORBIC ACID (VITAMIN C PO) Take 1 Tab by mouth daily.  VITAMIN E ACETATE (VITAMIN E PO) Take 1 Tab by mouth daily.        Allergies   Allergen Reactions    Statins-Hmg-Coa Reductase Inhibitors Other (comments)    Crestor [Rosuvastatin] Myalgia    Lipitor [Atorvastatin] Myalgia    Sulfa (Sulfonamide Antibiotics) Hives     Other reaction(s): unknown    Tetanus Immune Globulin Unknown (comments)    Tetanus Vaccines And Toxoid Other (comments)     Local reaction--arm red and swollen

## 2020-08-12 NOTE — PROGRESS NOTES
[address] Medicine Residency Attending Addendum:  Dr. Cruz Good MD,  the patient and I were not physically present during this encounter. The resident and I are concurrently monitoring the patient care through appropriate telecommunication technology. I discussed the findings, assessment and plan with the resident and agree with the resident's findings and plan as documented in the resident's note.       Nada Favre, MD

## 2020-11-10 DIAGNOSIS — I48.0 PAF (PAROXYSMAL ATRIAL FIBRILLATION) (HCC): ICD-10-CM

## 2020-11-10 DIAGNOSIS — F33.41 RECURRENT MAJOR DEPRESSIVE DISORDER, IN PARTIAL REMISSION (HCC): ICD-10-CM

## 2020-11-10 DIAGNOSIS — I10 ESSENTIAL HYPERTENSION: ICD-10-CM

## 2020-11-10 RX ORDER — AMLODIPINE BESYLATE 10 MG/1
10 TABLET ORAL DAILY
Qty: 90 TAB | Refills: 1 | Status: SHIPPED | OUTPATIENT
Start: 2020-11-10 | End: 2021-08-10 | Stop reason: SDUPTHER

## 2020-11-10 RX ORDER — CLONIDINE HYDROCHLORIDE 0.2 MG/1
0.2 TABLET ORAL 2 TIMES DAILY
Qty: 180 TAB | Refills: 1 | Status: SHIPPED | OUTPATIENT
Start: 2020-11-10 | End: 2021-08-10 | Stop reason: SDUPTHER

## 2020-11-10 RX ORDER — FLUOXETINE 10 MG/1
10 CAPSULE ORAL DAILY
Qty: 90 CAP | Refills: 1 | Status: SHIPPED | OUTPATIENT
Start: 2020-11-10 | End: 2021-08-10 | Stop reason: SDUPTHER

## 2020-11-10 RX ORDER — DIGOXIN 125 MCG
TABLET ORAL
Qty: 90 TAB | Refills: 1 | Status: SHIPPED | OUTPATIENT
Start: 2020-11-10 | End: 2021-08-10 | Stop reason: SDUPTHER

## 2021-06-25 ENCOUNTER — VIRTUAL VISIT (OUTPATIENT)
Dept: FAMILY MEDICINE CLINIC | Age: 71
End: 2021-06-25
Payer: MEDICARE

## 2021-06-25 DIAGNOSIS — T14.8XXA SKIN ABRASION: ICD-10-CM

## 2021-06-25 DIAGNOSIS — E11.21 TYPE 2 DIABETES WITH NEPHROPATHY (HCC): ICD-10-CM

## 2021-06-25 DIAGNOSIS — I10 ESSENTIAL HYPERTENSION: ICD-10-CM

## 2021-06-25 DIAGNOSIS — I48.0 PAF (PAROXYSMAL ATRIAL FIBRILLATION) (HCC): ICD-10-CM

## 2021-06-25 DIAGNOSIS — E78.00 HYPERCHOLESTEROLEMIA: ICD-10-CM

## 2021-06-25 DIAGNOSIS — T14.8XXA ANIMAL SCRATCH: Primary | ICD-10-CM

## 2021-06-25 PROCEDURE — G8756 NO BP MEASURE DOC: HCPCS | Performed by: STUDENT IN AN ORGANIZED HEALTH CARE EDUCATION/TRAINING PROGRAM

## 2021-06-25 PROCEDURE — G8400 PT W/DXA NO RESULTS DOC: HCPCS | Performed by: STUDENT IN AN ORGANIZED HEALTH CARE EDUCATION/TRAINING PROGRAM

## 2021-06-25 PROCEDURE — G8427 DOCREV CUR MEDS BY ELIG CLIN: HCPCS | Performed by: STUDENT IN AN ORGANIZED HEALTH CARE EDUCATION/TRAINING PROGRAM

## 2021-06-25 PROCEDURE — 1101F PT FALLS ASSESS-DOCD LE1/YR: CPT | Performed by: STUDENT IN AN ORGANIZED HEALTH CARE EDUCATION/TRAINING PROGRAM

## 2021-06-25 PROCEDURE — 2022F DILAT RTA XM EVC RTNOPTHY: CPT | Performed by: STUDENT IN AN ORGANIZED HEALTH CARE EDUCATION/TRAINING PROGRAM

## 2021-06-25 PROCEDURE — G9717 DOC PT DX DEP/BP F/U NT REQ: HCPCS | Performed by: STUDENT IN AN ORGANIZED HEALTH CARE EDUCATION/TRAINING PROGRAM

## 2021-06-25 PROCEDURE — 1090F PRES/ABSN URINE INCON ASSESS: CPT | Performed by: STUDENT IN AN ORGANIZED HEALTH CARE EDUCATION/TRAINING PROGRAM

## 2021-06-25 PROCEDURE — 3046F HEMOGLOBIN A1C LEVEL >9.0%: CPT | Performed by: STUDENT IN AN ORGANIZED HEALTH CARE EDUCATION/TRAINING PROGRAM

## 2021-06-25 PROCEDURE — 99214 OFFICE O/P EST MOD 30 MIN: CPT | Performed by: STUDENT IN AN ORGANIZED HEALTH CARE EDUCATION/TRAINING PROGRAM

## 2021-06-25 PROCEDURE — 3017F COLORECTAL CA SCREEN DOC REV: CPT | Performed by: STUDENT IN AN ORGANIZED HEALTH CARE EDUCATION/TRAINING PROGRAM

## 2021-06-25 RX ORDER — AMOXICILLIN AND CLAVULANATE POTASSIUM 875; 125 MG/1; MG/1
1 TABLET, FILM COATED ORAL EVERY 12 HOURS
Qty: 10 TABLET | Refills: 0 | Status: SHIPPED | OUTPATIENT
Start: 2021-06-25 | End: 2021-06-30

## 2021-06-25 NOTE — PROGRESS NOTES
[address] Medicine Residency Attending Addendum:  Dr. Hannah Marshall MD,  the patient and I were not physically present during this encounter. The resident and I are concurrently monitoring the patient care through appropriate telecommunication technology. I discussed the findings, assessment and plan with the resident and agree with the resident's findings and plan as documented in the resident's note.       Elie Mays MD

## 2021-06-25 NOTE — PROGRESS NOTES
Rosalie Lozano  79 y.o. female  1950  [address] [facility]  [id number]   [facility]:    Telemedicine Progress Note  Camille Villegas MD       Encounter Date and Time: June 25, 2021 at 3:17 PM    Consent:  She and/or the health care decision maker is aware that that she may receive a bill for this telephone/video service, depending on her insurance coverage, and has provided verbal consent to proceed: Yes    Chief Complaint   Patient presents with   [address] [place] [address]     History of Present Illness   Rosalie Lozano is a 79 y.o. female was evaluated by synchronous (real-time) audio-video technology from home. Pt presents due to \"rooster attack\". Has a rooster at home that got defensive of chickens and scratched patient's R forearm in several places. Pt cleaned and bandaged the wounds, does not think anything was too deep, but is worried about infection and hx of allergy to tetanus shot and asks what should be her next steps. Pt also due for labs, has 2 months of meds left, uses mail pharmacy and would like labs to be done before refills are needed. Review of Systems   Review of Systems   Constitutional: Negative for chills and fever. Respiratory: Negative for cough and shortness of breath. Gastrointestinal: Negative for abdominal pain, nausea and vomiting. Skin:        Animal scratches on R forearm   Neurological: Negative for dizziness and headaches.        Vitals/Objective:     General: alert, cooperative, no distress   Mental  status: mental status: alert, oriented to person, place, and time, normal mood, behavior, speech, dress, motor activity, and thought processes   Resp: resp: normal effort, no respiratory distress and not coughing   Neuro: neuro: no gross deficits   Skin: skin: 3 scratches noted on R forearm that span entire length of forearm and 1 smaller scratch about 1/3 of forearm, surrounding bruising but no surrounding erythema or cellulitis noted at this time   Due to this being a TeleHealth evaluation, many elements of the physical examination are unable to be assessed. Assessment and Plan:   Time-based coding, delete if not needed: I spent at least 15 minutes with this established patient, and >50% of the time was spent counseling and/or coordinating care regarding scratches from rooster attack    Nany Shipley is a 79 y.o. female with scratches from rooster attack in need of infection prophylaxis, also due for regular labs    1. Animal scratch  -START amoxicillin-clavulanate (AUGMENTIN) 875-125 mg per tablet; Take 1 Tablet by mouth every twelve (12) hours for 5 days. Dispense: 10 Tablet; Refill: 0  -Pt allergic to tetanus vaccine, last time she got this was over 10 years ago, pt instructed she can go to ER for tetanus immunoglobulin, though may not be necessary if wound is not deep and no punctures as pt reports but no way to tell this over video, pt voiced understanding    2. Type 2 diabetes with nephropathy (HCC) - diet controlled  Lab Results   Component Value Date/Time    Hemoglobin A1c 6.4 (H) 01/16/2020 01:04 PM     - HEMOGLOBIN A1C WITH EAG; Future  - MICROALBUMIN, UR, RAND W/ MICROALB/CREAT RATIO; Future  - METABOLIC PANEL, COMPREHENSIVE; Future    3. PAF (paroxysmal atrial fibrillation) ([facility] [address] - denies any CP or palpitations  - DIGOXIN; Future  - METABOLIC PANEL, COMPREHENSIVE; Future    4. Essential hypertension - stable  BP Readings from Last 3 Encounters:   01/16/20 135/69   09/12/19 134/64   06/11/19 350/95     - METABOLIC PANEL, COMPREHENSIVE; Future    5. Hypercholesterolemia  Lab Results   Component Value Date/Time    LDL, calculated 113.8 (H) 01/16/2020 01:04 PM     - LIPID PANEL; Future  - METABOLIC PANEL, COMPREHENSIVE;  Future        Time spent in direct conversation with the patient to include medical condition(s) discussed, assessment and treatment plan:       We discussed the expected course, resolution and complications of the diagnosis(es) in detail. Medication risks, benefits, costs, interactions, and alternatives were discussed as indicated. I advised her to contact the office if her condition worsens, changes or fails to improve as anticipated. She expressed understanding with the diagnosis(es) and plan. Patient understands that this encounter was a temporary measure, and the importance of further follow up and examination was emphasized. Patient verbalized understanding. Patient informed to follow up: Follow-up and Dispositions  ·   Return in about 1 month (around 7/25/2021), or if symptoms worsen or fail to improve, for Medicare wellness visit, Chronic Medical Problems. Electronically Signed: Siva Prabhakar MD    Providers location when delivering service (clinic, hospital, home): Home      ICD-10-CM ICD-9-CM    1. Animal scratch  T14. 8XXA 919.0 amoxicillin-clavulanate (AUGMENTIN) 875-125 mg per tablet     E906.8    2. Skin abrasion  T14. 8XXA 919.0 amoxicillin-clavulanate (AUGMENTIN) 875-125 mg per tablet   3. Recurrent major depressive disorder, [facility] partial [facility]  F33.41 296.35    4. Type 2 diabetes with nephropathy ([facility])  E11.21 250.40 HEMOGLOBIN A1C WITH EAG     583.81 MICROALBUMIN, UR, RAND W/ MICROALB/CREAT RATIO      METABOLIC PANEL, COMPREHENSIVE   5. PAF (paroxysmal atrial fibrillation) ([facility])  I48.0 427.31 DIGOXIN      METABOLIC PANEL, COMPREHENSIVE   6. Essential hypertension  L56 274.6 METABOLIC PANEL, COMPREHENSIVE   7. Hypercholesterolemia  E78.00 272.0 LIPID PANEL      METABOLIC PANEL, COMPREHENSIVE         Pursuant to the emergency declaration under the [facility] waiver authority and the [employer] and [employer] Act, this Virtual  Visit was conducted, with patient's consent, to reduce the patient's risk of exposure to COVID-19 and provide continuity of care for an established patient.      Services were provided through a video synchronous discussion virtually to substitute for in-person clinic visit. History   Patients past medical, surgical and family histories were reviewed. Past Medical History:   Diagnosis Date    Arthritis     Depression     Diabetes ([address]     Hypercholesterolemia     Hypertension     PAF (paroxysmal atrial fibrillation) (HCC)     Sleep apnea     Status post gastric banding      Past Surgical History:   Procedure Laterality Date    CARDIAC CATHETERIZATION      HX APPENDECTOMY      HX GI  2006    lap band    HX HYSTERECTOMY  1995     Family History   Problem Relation Age of Onset    Diabetes Mother     Hypertension Mother     Heart Disease Mother     Stroke Mother     Hypertension Father     Stroke Father     Arthritis-rheumatoid Father     Alcohol abuse Paternal Grandfather     Asthma Neg Hx     Bleeding Prob Neg Hx     Cancer Neg Hx     Elevated Lipids Neg Hx     Headache Neg Hx     Lung Disease Neg Hx     Migraines Neg Hx     Psychiatric Disorder Neg Hx     Mental Retardation Neg Hx      Social History     Socioeconomic History    Marital status: SINGLE     Spouse name: Not on file    Number of children: Not on file    Years of education: Not on file    Highest education level: Not on file   Occupational History    Not on file   Tobacco Use    Smoking status: Never Smoker    Smokeless tobacco: Never Used   Substance and Sexual Activity    Alcohol use: No    Drug use: No    Sexual activity: Never   Other Topics Concern    Not on file   Social History Narrative    Not on file     Social Determinants of Health     Financial Resource Strain:     Difficulty of Paying Living Expenses:    Food Insecurity:     Worried About Running Out of Food in the Last Year:     Ran Out of Food in the Last Year:    Transportation Needs:     Lack of Transportation (Medical):      Lack of Transportation (Non-Medical):    Physical Activity:     Days of Exercise per Week:     Minutes of Exercise per Session:    Stress:     Feeling of Stress :    Social Connections:     Frequency of Communication with Friends and Family:     Frequency of Social Gatherings with Friends and Family:     Attends Bahai Services:     Active Member of Clubs or Organizations:     Attends Club or Organization Meetings:     Marital Status:    Intimate Partner Violence:     Fear of Current or Ex-Partner:     Emotionally Abused:     Physically Abused:     Sexually Abused:      Patient Active Problem List   Diagnosis Code    Hypertension I10    Hypercholesterolemia E78.00    Sleep apnea G47.30    PAF (paroxysmal atrial fibrillation) ([facility] [address] I48.0    Status post gastric banding Z98.84    Glucose intolerance (impaired glucose tolerance) R73.02    Clinical depression F32.9    Type 2 diabetes with nephropathy (HCC) E11.21    Iron deficiency anemia D50.9    Fracture of unspecified bones T14. 8XXA    Irregular heart rhythm I49.9    Drug-induced constipation K59.03    Primary insomnia F51.01    Cataract H26.9          Current Medications/Allergies   Medications and Allergies reviewed:    Current Outpatient Medications   Medication Sig Dispense Refill    amoxicillin-clavulanate (AUGMENTIN) 875-125 mg per tablet Take 1 Tablet by mouth every twelve (12) hours for 5 days. 10 Tablet 0    digoxin (LANOXIN) 0.125 mg tablet TAKE ONE TABLET BY MOUTH EVERY DAY 90 Tab 1    cloNIDine HCL (CATAPRES) 0.2 mg tablet Take 1 Tab by mouth two (2) times a day. 180 Tab 1    amLODIPine (NORVASC) 10 mg tablet Take 1 Tab by mouth daily. 90 Tab 1    FLUoxetine (PROzac) 10 mg capsule Take 1 Cap by mouth daily. 90 Cap 1    melatonin 5 mg cap capsule Take 2 Caps by mouth nightly. 90 Cap 3    ferrous sulfate 325 mg (65 mg iron) tablet Take 1 Tab by mouth three (3) times daily (with meals).  (Patient taking differently: Take 325 mg by mouth daily.) 90 Tab 5    miscellaneous medical supply (BLOOD PRESSURE CUFF) misc Use to take blood pressure as recommended by MD 1 Each 0    magnesium oxide (MAG-OX) 400 mg tablet Take 400 mg by mouth daily.  Blood-Glucose Meter (BLOOD GLUCOSE MONITORING) monitoring kit Check BS daily 1 Kit 0    cholecalciferol, vitamin d3, (VITAMIN D) 1,000 unit tablet Take 1,000 Units by mouth daily.  PV W-O MAGDIEL/FERROUS FUMARATE/FA (M-VIT PO) Take 1 Tab by mouth daily.  ASCORBIC ACID (VITAMIN C PO) Take 1 Tab by mouth daily.  VITAMIN E ACETATE (VITAMIN E PO) Take 1 Tab by mouth daily.        Allergies   Allergen Reactions    Statins-Hmg-Coa Reductase Inhibitors Other (comments)    Crestor [Rosuvastatin] Myalgia    Lipitor [Atorvastatin] Myalgia    Sulfa (Sulfonamide Antibiotics) Hives     Other reaction(s): unknown    Tetanus Vaccines And Toxoid Other (comments)     Local reaction--arm red and swollen

## 2021-08-06 ENCOUNTER — TELEPHONE (OUTPATIENT)
Dept: FAMILY MEDICINE CLINIC | Age: 71
End: 2021-08-06

## 2021-08-06 NOTE — TELEPHONE ENCOUNTER
Pt has had her lab work done she needs her medications refilled.  Pharmacy has sent over the refill request by fax

## 2021-08-10 DIAGNOSIS — I48.0 PAF (PAROXYSMAL ATRIAL FIBRILLATION) (HCC): ICD-10-CM

## 2021-08-10 DIAGNOSIS — D50.9 IRON DEFICIENCY ANEMIA, UNSPECIFIED IRON DEFICIENCY ANEMIA TYPE: ICD-10-CM

## 2021-08-10 DIAGNOSIS — F33.41 RECURRENT MAJOR DEPRESSIVE DISORDER, IN PARTIAL REMISSION (HCC): ICD-10-CM

## 2021-08-10 DIAGNOSIS — I10 ESSENTIAL HYPERTENSION: ICD-10-CM

## 2021-08-10 NOTE — TELEPHONE ENCOUNTER
Patient states pharmacy has sent over fax over a few times already for these RXs. Pt is almost out of these medications. Can you please send these over to pharmacy ASAP.  Call pt when sent

## 2021-08-11 RX ORDER — LANOLIN ALCOHOL/MO/W.PET/CERES
325 CREAM (GRAM) TOPICAL
Qty: 90 TABLET | Refills: 5 | Status: SHIPPED | OUTPATIENT
Start: 2021-08-11

## 2021-08-11 RX ORDER — AMLODIPINE BESYLATE 10 MG/1
10 TABLET ORAL DAILY
Qty: 90 TABLET | Refills: 1 | Status: SHIPPED | OUTPATIENT
Start: 2021-08-11 | End: 2022-01-27

## 2021-08-11 RX ORDER — FLUOXETINE 10 MG/1
10 CAPSULE ORAL DAILY
Qty: 90 CAPSULE | Refills: 1 | Status: SHIPPED | OUTPATIENT
Start: 2021-08-11 | End: 2022-01-27

## 2021-08-11 RX ORDER — DIGOXIN 125 MCG
TABLET ORAL
Qty: 90 TABLET | Refills: 1 | Status: SHIPPED | OUTPATIENT
Start: 2021-08-11 | End: 2022-01-27

## 2021-08-11 RX ORDER — CLONIDINE HYDROCHLORIDE 0.2 MG/1
0.2 TABLET ORAL 2 TIMES DAILY
Qty: 180 TABLET | Refills: 1 | Status: SHIPPED | OUTPATIENT
Start: 2021-08-11 | End: 2022-01-27

## 2022-01-27 DIAGNOSIS — I48.0 PAF (PAROXYSMAL ATRIAL FIBRILLATION) (HCC): ICD-10-CM

## 2022-01-27 DIAGNOSIS — F33.41 RECURRENT MAJOR DEPRESSIVE DISORDER, IN PARTIAL REMISSION (HCC): ICD-10-CM

## 2022-01-27 DIAGNOSIS — I10 ESSENTIAL HYPERTENSION: ICD-10-CM

## 2022-01-27 RX ORDER — DIGOXIN 125 MCG
TABLET ORAL
Qty: 90 TABLET | Refills: 3 | Status: SHIPPED | OUTPATIENT
Start: 2022-01-27

## 2022-01-27 RX ORDER — AMLODIPINE BESYLATE 10 MG/1
TABLET ORAL
Qty: 90 TABLET | Refills: 3 | Status: SHIPPED | OUTPATIENT
Start: 2022-01-27

## 2022-01-27 RX ORDER — FLUOXETINE 10 MG/1
CAPSULE ORAL
Qty: 90 CAPSULE | Refills: 3 | Status: SHIPPED | OUTPATIENT
Start: 2022-01-27

## 2022-01-27 RX ORDER — CLONIDINE HYDROCHLORIDE 0.2 MG/1
TABLET ORAL
Qty: 180 TABLET | Refills: 3 | Status: SHIPPED | OUTPATIENT
Start: 2022-01-27

## 2022-03-18 PROBLEM — F32.A CLINICAL DEPRESSION: Status: ACTIVE | Noted: 2019-01-10

## 2022-03-18 PROBLEM — D50.9 IRON DEFICIENCY ANEMIA: Status: ACTIVE | Noted: 2019-01-24

## 2022-03-18 PROBLEM — H26.9 CATARACT: Status: ACTIVE | Noted: 2020-08-06

## 2022-03-18 PROBLEM — K59.03 DRUG-INDUCED CONSTIPATION: Status: ACTIVE | Noted: 2019-02-12

## 2022-03-19 PROBLEM — E11.21 TYPE 2 DIABETES WITH NEPHROPATHY (HCC): Status: ACTIVE | Noted: 2019-01-16

## 2022-03-19 PROBLEM — F51.01 PRIMARY INSOMNIA: Status: ACTIVE | Noted: 2019-02-12

## 2022-03-20 PROBLEM — I49.9 IRREGULAR HEART RHYTHM: Status: ACTIVE | Noted: 2019-02-12

## 2023-01-23 DIAGNOSIS — F33.41 RECURRENT MAJOR DEPRESSIVE DISORDER, IN PARTIAL REMISSION (HCC): ICD-10-CM

## 2023-01-23 DIAGNOSIS — I48.0 PAF (PAROXYSMAL ATRIAL FIBRILLATION) (HCC): ICD-10-CM

## 2023-01-23 DIAGNOSIS — I10 ESSENTIAL HYPERTENSION: ICD-10-CM

## 2023-01-24 RX ORDER — FLUOXETINE 10 MG/1
CAPSULE ORAL
Qty: 90 CAPSULE | Refills: 3 | OUTPATIENT
Start: 2023-01-24

## 2023-01-24 RX ORDER — CLONIDINE HYDROCHLORIDE 0.2 MG/1
TABLET ORAL
Qty: 180 TABLET | Refills: 3 | OUTPATIENT
Start: 2023-01-24

## 2023-01-24 RX ORDER — AMLODIPINE BESYLATE 10 MG/1
TABLET ORAL
Qty: 90 TABLET | Refills: 3 | OUTPATIENT
Start: 2023-01-24

## 2023-01-24 RX ORDER — DIGOXIN 125 MCG
TABLET ORAL
Qty: 90 TABLET | Refills: 3 | OUTPATIENT
Start: 2023-01-24

## 2023-01-24 NOTE — TELEPHONE ENCOUNTER
Patient called, no answer. Patient contact called and number disconnected. Patient has permission to release with person listed, however no contact information noted. Patient requires appt prior to refills of medication.

## 2023-01-27 DIAGNOSIS — I48.0 PAF (PAROXYSMAL ATRIAL FIBRILLATION) (HCC): ICD-10-CM

## 2023-01-27 DIAGNOSIS — F33.41 RECURRENT MAJOR DEPRESSIVE DISORDER, IN PARTIAL REMISSION (HCC): ICD-10-CM

## 2023-01-27 DIAGNOSIS — I10 ESSENTIAL HYPERTENSION: ICD-10-CM

## 2023-01-27 RX ORDER — DIGOXIN 125 MCG
TABLET ORAL
Qty: 90 TABLET | Refills: 3 | Status: SHIPPED | OUTPATIENT
Start: 2023-01-27

## 2023-01-27 RX ORDER — FLUOXETINE 10 MG/1
CAPSULE ORAL
Qty: 90 CAPSULE | Refills: 0 | Status: SHIPPED | OUTPATIENT
Start: 2023-01-27

## 2023-01-27 RX ORDER — CLONIDINE HYDROCHLORIDE 0.2 MG/1
TABLET ORAL
Qty: 180 TABLET | Refills: 0 | Status: SHIPPED | OUTPATIENT
Start: 2023-01-27

## 2023-01-27 RX ORDER — AMLODIPINE BESYLATE 10 MG/1
TABLET ORAL
Qty: 90 TABLET | Refills: 0 | Status: SHIPPED | OUTPATIENT
Start: 2023-01-27

## 2023-01-27 NOTE — TELEPHONE ENCOUNTER
Pt is almost out of meds and needs refills sent in today so that her mail pharmacy will get them to her on time. Please advise.

## 2023-02-17 ENCOUNTER — OFFICE VISIT (OUTPATIENT)
Dept: FAMILY MEDICINE CLINIC | Age: 73
End: 2023-02-17
Payer: MEDICARE

## 2023-02-17 VITALS
HEART RATE: 62 BPM | SYSTOLIC BLOOD PRESSURE: 159 MMHG | DIASTOLIC BLOOD PRESSURE: 66 MMHG | BODY MASS INDEX: 25.31 KG/M2 | OXYGEN SATURATION: 99 % | HEIGHT: 68 IN | WEIGHT: 167 LBS | TEMPERATURE: 98.3 F | RESPIRATION RATE: 20 BRPM

## 2023-02-17 DIAGNOSIS — E78.00 HYPERCHOLESTEROLEMIA: ICD-10-CM

## 2023-02-17 DIAGNOSIS — I10 ESSENTIAL HYPERTENSION: Primary | ICD-10-CM

## 2023-02-17 DIAGNOSIS — E11.21 TYPE 2 DIABETES WITH NEPHROPATHY (HCC): ICD-10-CM

## 2023-02-17 DIAGNOSIS — Z13.820 ENCOUNTER FOR SCREENING FOR OSTEOPOROSIS: ICD-10-CM

## 2023-02-17 DIAGNOSIS — Z00.00 ENCOUNTER FOR MEDICARE ANNUAL WELLNESS EXAM: ICD-10-CM

## 2023-02-17 DIAGNOSIS — D50.9 IRON DEFICIENCY ANEMIA, UNSPECIFIED IRON DEFICIENCY ANEMIA TYPE: ICD-10-CM

## 2023-02-17 DIAGNOSIS — Z28.21 PNEUMOCOCCAL VACCINATION DECLINED: ICD-10-CM

## 2023-02-17 DIAGNOSIS — Z71.89 ADVANCED DIRECTIVES, COUNSELING/DISCUSSION: ICD-10-CM

## 2023-02-17 DIAGNOSIS — Z53.20 COLONOSCOPY REFUSED: ICD-10-CM

## 2023-02-17 DIAGNOSIS — Z53.20 MAMMOGRAM DECLINED: ICD-10-CM

## 2023-02-17 DIAGNOSIS — I48.0 PAF (PAROXYSMAL ATRIAL FIBRILLATION) (HCC): ICD-10-CM

## 2023-02-17 DIAGNOSIS — Z78.0 ASYMPTOMATIC MENOPAUSAL STATE: ICD-10-CM

## 2023-02-17 NOTE — PATIENT INSTRUCTIONS
Medicare Wellness Visit, Female     The best way to live healthy is to have a lifestyle where you eat a well-balanced diet, exercise regularly, limit alcohol use, and quit all forms of tobacco/nicotine, if applicable. Regular preventive services are another way to keep healthy. Preventive services (vaccines, screening tests, monitoring & exams) can help personalize your care plan, which helps you manage your own care. Screening tests can find health problems at the earliest stages, when they are easiest to treat. [facility] follows the current, evidence-based guidelines published by the [facility] ([facility]) when recommending preventive services for our patients. Because we follow these guidelines, sometimes recommendations change over time as research supports it. (For example, mammograms used to be recommended annually. Even though Medicare will still pay for an annual mammogram, the newer guidelines recommend a mammogram every two years for women of average risk). Of course, you and your doctor may decide to screen more often for some diseases, based on your risk and your co-morbidities (chronic disease you are already diagnosed with). Preventive services for you include:  - Medicare offers their members a free annual wellness visit, which is time for you and your primary care provider to discuss and plan for your preventive service needs.  Take advantage of this benefit every year!    -Over the age of 72 should receive the recommended pneumonia vaccines.    -All adults should have a flu vaccine yearly.  -All adults should have a tetanus vaccine every 10 years.   -Over the age 48 should receive the shingles vaccines.        -All adults should be screened once for Hepatitis C.  -All adults age 38-68 who are overweight should have a diabetes screening test once every three years.   -Other screening tests and preventive services for persons with diabetes include: an eye exam to screen for diabetic retinopathy, a kidney function test, a foot exam, and stricter control over your cholesterol.   -Cardiovascular screening for adults with routine risk involves an electrocardiogram (ECG) at intervals determined by your doctor.     -Colorectal cancer screenings should be done for adults age 39-70 with no increased risk factors for colorectal cancer. There are a number of acceptable methods of screening for this type of cancer. Each test has its own benefits and drawbacks. Discuss with your doctor what is most appropriate for you during your annual wellness visit. The different tests include: colonoscopy (considered the best screening method), a fecal occult blood test, a fecal DNA test, and sigmoidoscopy.    -Lung cancer screening is recommended annually with a low dose CT scan for adults between age 54 and 68, who have smoked at least 30 pack years (equivalent of 1 pack per day for 30 days), and who is a current smoker or quit less than 15 years ago.    -A bone mass density test is recommended when a woman turns 65 to screen for osteoporosis. This test is only recommended one time, as a screening. Some providers will use this same test as a disease monitoring tool if you already have osteoporosis. -Breast cancer screenings are recommended every other year for women of normal risk, age 54-69.    -Cervical cancer screenings for women over age 72 are only recommended with certain risk factors.      Here is a list of your current Health Maintenance items (your personalized list of preventive services) with a due date:  Health Maintenance Due   Topic Date Due    COVID-19 Vaccine (1) Never done    Pneumococcal Vaccine (1 - PCV) Never done    Depression Monitoring  Never done    DTaP/Tdap/Td  (1 - Tdap) Never done    Shingles Vaccine (1 of 2) Never done    Bone Mineral Density   Never done    Mammogram  01/10/2016    Diabetic Foot Care  02/12/2020    Eye Exam 02/11/2021    Colorectal Screening  02/15/2022    Yearly Flu Vaccine (1) 08/01/2022    URINE CHECK FOR KIDNEY PROBLEMS  08/04/2022    Kidney Testing  08/04/2022    Hemoglobin A1C    08/04/2022    Cholesterol Test   08/04/2022

## 2023-02-17 NOTE — PROGRESS NOTES
1. Have you been to the ER, urgent care clinic since your last visit? Hospitalized since your last visit? No    2. Have you seen or consulted any other health care providers outside of the [address] [place] [address] since your last visit? Include any pap smears or colon screening. No  Reviewed record in preparation for visit and have necessary documentation  Pt did not bring medication to office visit for review  opportunity was given for questions      3. For patients aged 39-70: Has the patient had a colonoscopy / FIT/ Cologuard? No      If the patient is female:    4. For patients aged 41-77: Has the patient had a mammogram within the past 2 years? No      5. For patients aged 21-65: Has the patient had a pap smear?  NA - based on age or sex      Goals that were addressed and/or need to be completed during or after this appointment include   Health Maintenance Due   Topic Date Due    COVID-19 Vaccine (1) Never done    Pneumococcal 65+ years (1 - PCV) Never done    Depression Monitoring  Never done    DTaP/Tdap/Td series (1 - Tdap) Never done    Shingles Vaccine (1 of 2) Never done    Bone Densitometry (Dexa) Screening  Never done    Breast Cancer Screen Mammogram  01/10/2016    Foot Exam Q1  02/12/2020    Medicare Yearly Exam  04/17/2020    Eye Exam Retinal or Dilated  02/11/2021    Colorectal Cancer Screening Combo  02/15/2022    Flu Vaccine (1) 08/01/2022    Diabetic Alb to Cr ratio (uACR) test  08/04/2022    GFR test (Diabetes, CKD 3-4, OR last GFR 15-59)  08/04/2022    A1C test (Diabetic or Prediabetic)  08/04/2022    Lipid Screen  08/04/2022

## 2023-02-17 NOTE — PROGRESS NOTES
This is the Subsequent Medicare Annual Wellness Exam, performed 12 months or more after the Initial AWV or the last Subsequent AWV    I have reviewed the patient's medical history in detail and updated the computerized patient record. Assessment/Plan   Education and counseling provided:  Are appropriate based on today's review and evaluation  End-of-Life planning (with patient's consent)  Pneumococcal Vaccine  Screening Mammography  Colorectal cancer screening tests  Bone mass measurement (DEXA)    1. Essential hypertension  -     METABOLIC PANEL, COMPREHENSIVE; Future  2. Type 2 diabetes with nephropathy (HCC)  -     METABOLIC PANEL, COMPREHENSIVE; Future  -     MICROALBUMIN, UR, RAND W/ MICROALB/CREAT RATIO; Future  3. Hypercholesterolemia  -     LIPID PANEL; Future  4. PAF (paroxysmal atrial fibrillation) ([facility])  -     DIGOXIN; Future  5. Iron deficiency anemia, unspecified iron deficiency anemia type  6. Encounter for Medicare annual wellness exam  7. Mammogram declined  8. Colonoscopy refused  9. Encounter for screening for osteoporosis  -     DEXA BONE DENSITY STUDY AXIAL; Future  10. Advanced directives, counseling/discussion  -     DO NOT RESUSCITATE  6. Asymptomatic menopausal state   -     DEXA BONE DENSITY STUDY AXIAL; Future  12.  Pneumococcal vaccination declined       Depression Risk Factor Screening     3 most recent PHQ Screens 2/17/2023   Little interest or pleasure in doing things Not at all   Feeling down, depressed, irritable, or hopeless Not at all   Total Score PHQ 2 0   Trouble falling or staying asleep, or sleeping too much Not at all   Feeling tired or having little energy Several days   Poor appetite, weight loss, or overeating Not at all   Feeling bad about yourself - or that you are a failure or have let yourself or your family down Not at all   Trouble concentrating on things such as school, work, reading, or watching TV Not at all   Moving or speaking so slowly that other people could have noticed; or the opposite being so fidgety that others notice Not at all   Thoughts of being better off dead, or hurting yourself in some way Not at all   PHQ 9 Score 1   How difficult have these problems made it for you to do your work, take care of your home and get along with others Not difficult at all       Alcohol & Drug Abuse Risk Screen    Do you average more than 1 drink per night or more than 7 drinks a week:  No    On any one occasion in the past three months have you have had more than 3 drinks containing alcohol:  No          Functional Ability and Level of Safety    Hearing: Hearing is good. Activities of Daily Living: The home contains: no safety equipment. Patient does total self care      Ambulation: with no difficulty     Fall Risk:  Fall Risk Assessment, last 12 mths 2/17/2023   Able to walk? Yes   Fall in past 12 months? 0   Do you feel unsteady?  0   Are you worried about falling 0      Abuse Screen:  Patient is not abused       Cognitive Screening    Has your family/caregiver stated any concerns about your memory: no     Cognitive Screening: Normal - Mini Cog Test    Health Maintenance Due     Health Maintenance Due   Topic Date Due    COVID-19 Vaccine (1) Never done    Pneumococcal 65+ years (1 - PCV) Never done    DTaP/Tdap/Td series (1 - Tdap) Never done    Shingles Vaccine (1 of 2) Never done    Bone Densitometry (Dexa) Screening  Never done    Breast Cancer Screen Mammogram  01/10/2016    Foot Exam Q1  02/12/2020    Eye Exam Retinal or Dilated  02/11/2021    Colorectal Cancer Screening Combo  02/15/2022    Flu Vaccine (1) 08/01/2022    A1C test (Diabetic or Prediabetic)  08/04/2022       Patient Care Team   Patient Care Team:  Janette Pulido MD as PCP - General (Family Medicine)  Isaías Jimenez MD (Cardiovascular Disease Physician)    History     Patient Active Problem List   Diagnosis Code    Hypertension I10    Hypercholesterolemia E78.00    Sleep apnea G47.30    PAF (paroxysmal atrial fibrillation) (HCC) I48.0    Status post gastric banding Z98.84    Glucose intolerance (impaired glucose tolerance) R73.02    Clinical depression F32. A    Type 2 diabetes with nephropathy (HCC) E11.21    Iron deficiency anemia D50.9    Fracture of unspecified bones T14. 8XXA    Irregular heart rhythm I49.9    Drug-induced constipation K59.03    Primary insomnia F51.01    Cataract H26.9     Past Medical History:   Diagnosis Date    Arthritis     Depression     Diabetes (HCC)     Hypercholesterolemia     Hypertension     PAF (paroxysmal atrial fibrillation) ([facility])     Sleep apnea     Status post gastric banding       Past Surgical History:   Procedure Laterality Date    CARDIAC CATHETERIZATION      HX APPENDECTOMY      HX GI  2006    lap band    HX HYSTERECTOMY  1995     Current Outpatient Medications   Medication Sig Dispense Refill    digoxin (LANOXIN) 0.125 mg tablet TAKE 1 TABLET DAILY 90 Tablet 3    cloNIDine HCL (CATAPRES) 0.2 mg tablet TAKE 1 TABLET TWICE A  Tablet 0    amLODIPine (NORVASC) 10 mg tablet TAKE 1 TABLET DAILY 90 Tablet 0    FLUoxetine (PROzac) 10 mg capsule TAKE 1 CAPSULE DAILY 90 Capsule 0    melatonin 5 mg cap capsule Take 2 Caps by mouth nightly. 90 Cap 3    Blood-Glucose Meter (BLOOD GLUCOSE MONITORING) monitoring kit Check BS daily 1 Kit 0    cholecalciferol (VITAMIN D3) (1000 Units /25 mcg) tablet Take 1,000 Units by mouth daily. ASCORBIC ACID (VITAMIN C PO) Take 1 Tablet by mouth daily. VITAMIN E ACETATE (VITAMIN E PO) Take 1 Tab by mouth daily.       miscellaneous medical supply (BLOOD PRESSURE CUFF) misc Use to take blood pressure as recommended by MD 1 Each 0     Allergies   Allergen Reactions    Statins-Hmg-Coa Reductase Inhibitors Other (comments)    Crestor [Rosuvastatin] Myalgia    Lipitor [Atorvastatin] Myalgia    Sulfa (Sulfonamide Antibiotics) Hives     Other reaction(s): unknown    Tetanus Vaccines And Toxoid Other (comments)     Local reaction--arm red and swollen         Family History   Problem Relation Age of Onset    Diabetes Mother     Hypertension Mother     Heart Disease Mother     Stroke Mother     Hypertension Father     Stroke Father     Arthritis-rheumatoid Father     Alcohol abuse Paternal Grandfather     Asthma Neg Hx     Bleeding Prob Neg Hx     Cancer Neg Hx     Elevated Lipids Neg Hx     Headache Neg Hx     Lung Disease Neg Hx     Migraines Neg Hx     Psychiatric Disorder Neg Hx     Mental Retardation Neg Hx      Social History     Tobacco Use    Smoking status: Never    Smokeless tobacco: Never   Substance Use Topics    Alcohol use: No         Barrera Newsomen, DO

## 2023-02-17 NOTE — ACP (ADVANCE CARE PLANNING)
Advance Care Planning     Advance Care Planning (ACP) Physician/NP/PA Conversation      Date of Conversation: 2/17/2023  Conducted with: Patient with Decision Making Capacity    Healthcare Decision Maker:   No healthcare decision makers have been documented. Click here to complete [address] including selection of the Healthcare Decision Maker Relationship (ie \"Primary\")    Today we did NOT document Decision Maker(s) consistent with Legal Next of Kin hierarchy - pt wishes to appoint Chilango Deng as her MDM and we will have her fill out an ACP document allowing him to make decisions on her behalf. Care Preferences:    Hospitalization: \"If your health worsens and it becomes clear that your chance of recovery is unlikely, what would be your preference regarding hospitalization? \"  The patient would prefer comfort-focused treatment without hospitalization. Ventilation: \"If you were unable to breathe on your own and your chance of recovery was unlikely, what would be your preference about the use of a ventilator (breathing machine) if it was available to you? \"   The patient would NOT desire the use of a ventilator. Resuscitation: \"In the event your heart stopped as a result of an underlying serious health condition, would you want attempts to be made to restart your heart, or would you prefer a natural death? \"   No, do NOT attempt to resuscitate.     Additional topics discussed: hospice care    Conversation Outcomes / Follow-Up Plan:   ACP complete - no further action today  Reviewed DNR/DNI and patient elects DNR order - completed portable DNR form & placed order     Length of Voluntary ACP Conversation in minutes:  20 minutes    Thad Johnson MD

## 2023-02-18 LAB
ALBUMIN SERPL-MCNC: 3.5 G/DL (ref 3.5–5)
ALBUMIN/GLOB SERPL: 1 (ref 1.1–2.2)
ALP SERPL-CCNC: 78 U/L (ref 45–117)
ALT SERPL-CCNC: 21 U/L (ref 12–78)
ANION GAP SERPL CALC-SCNC: 9 MMOL/L (ref 5–15)
AST SERPL-CCNC: 22 U/L (ref 15–37)
BILIRUB SERPL-MCNC: 0.6 MG/DL (ref 0.2–1)
BUN SERPL-MCNC: 17 MG/DL (ref 6–20)
BUN/CREAT SERPL: 21 (ref 12–20)
CALCIUM SERPL-MCNC: 9.4 MG/DL (ref 8.5–10.1)
CHLORIDE SERPL-SCNC: 108 MMOL/L (ref 97–108)
CHOLEST SERPL-MCNC: 165 MG/DL
CO2 SERPL-SCNC: 22 MMOL/L (ref 21–32)
CREAT SERPL-MCNC: 0.81 MG/DL (ref 0.55–1.02)
CREAT UR-MCNC: 38.2 MG/DL
DIGOXIN SERPL-MCNC: 0.8 NG/ML (ref 0.9–2)
GLOBULIN SER CALC-MCNC: 3.6 G/DL (ref 2–4)
GLUCOSE SERPL-MCNC: 59 MG/DL (ref 65–100)
HDLC SERPL-MCNC: 62 MG/DL
HDLC SERPL: 2.7 (ref 0–5)
LDLC SERPL CALC-MCNC: 93.2 MG/DL (ref 0–100)
MICROALBUMIN UR-MCNC: 21.5 MG/DL
MICROALBUMIN/CREAT UR-RTO: 563 MG/G (ref 0–30)
POTASSIUM SERPL-SCNC: 6 MMOL/L (ref 3.5–5.1)
PROT SERPL-MCNC: 7.1 G/DL (ref 6.4–8.2)
SODIUM SERPL-SCNC: 139 MMOL/L (ref 136–145)
TRIGL SERPL-MCNC: 49 MG/DL (ref ?–150)
VLDLC SERPL CALC-MCNC: 9.8 MG/DL

## 2023-02-21 ENCOUNTER — TELEPHONE (OUTPATIENT)
Dept: FAMILY MEDICINE CLINIC | Age: 73
End: 2023-02-21

## 2023-02-21 NOTE — TELEPHONE ENCOUNTER
Pt states last week she was unable to have her labs drawn. Pt would like to come back in for repeat labs, however the labs from 2-17 state they have been completed. Please put in new lab orders and advise pt on when she can come in.

## 2023-02-21 NOTE — TELEPHONE ENCOUNTER
Lab was unable to result  HEMOGLOBIN A1C WITH EAG     CBC W/O DIFF  Please reorder pt would like to have labs drawn prior to follow up appt    Please advise

## 2023-02-24 ENCOUNTER — TELEPHONE (OUTPATIENT)
Dept: FAMILY MEDICINE CLINIC | Age: 73
End: 2023-02-24

## 2023-02-24 ENCOUNTER — LAB ONLY (OUTPATIENT)
Dept: FAMILY MEDICINE CLINIC | Age: 73
End: 2023-02-24

## 2023-02-24 DIAGNOSIS — E11.21 TYPE 2 DIABETES WITH NEPHROPATHY (HCC): ICD-10-CM

## 2023-02-24 DIAGNOSIS — E11.21 TYPE 2 DIABETES WITH NEPHROPATHY (HCC): Primary | ICD-10-CM

## 2023-02-24 DIAGNOSIS — D50.9 IRON DEFICIENCY ANEMIA, UNSPECIFIED IRON DEFICIENCY ANEMIA TYPE: ICD-10-CM

## 2023-02-25 LAB
ANION GAP SERPL CALC-SCNC: 10 MMOL/L (ref 5–15)
BUN SERPL-MCNC: 24 MG/DL (ref 6–20)
BUN/CREAT SERPL: 27 (ref 12–20)
CALCIUM SERPL-MCNC: 9.3 MG/DL (ref 8.5–10.1)
CHLORIDE SERPL-SCNC: 107 MMOL/L (ref 97–108)
CO2 SERPL-SCNC: 27 MMOL/L (ref 21–32)
CREAT SERPL-MCNC: 0.88 MG/DL (ref 0.55–1.02)
ERYTHROCYTE [DISTWIDTH] IN BLOOD BY AUTOMATED COUNT: 17.8 % (ref 11.5–14.5)
EST. AVERAGE GLUCOSE BLD GHB EST-MCNC: 111 MG/DL
GLUCOSE SERPL-MCNC: 86 MG/DL (ref 65–100)
HBA1C MFR BLD: 5.5 % (ref 4–5.6)
HCT VFR BLD AUTO: 39.9 % (ref 35–47)
HGB BLD-MCNC: 11.1 G/DL (ref 11.5–16)
MCH RBC QN AUTO: 21.9 PG (ref 26–34)
MCHC RBC AUTO-ENTMCNC: 27.8 G/DL (ref 30–36.5)
MCV RBC AUTO: 78.9 FL (ref 80–99)
NRBC # BLD: 0 K/UL (ref 0–0.01)
NRBC BLD-RTO: 0 PER 100 WBC
PLATELET # BLD AUTO: 302 K/UL (ref 150–400)
PMV BLD AUTO: 9.3 FL (ref 8.9–12.9)
POTASSIUM SERPL-SCNC: 4.2 MMOL/L (ref 3.5–5.1)
RBC # BLD AUTO: 5.06 M/UL (ref 3.8–5.2)
SODIUM SERPL-SCNC: 144 MMOL/L (ref 136–145)
WBC # BLD AUTO: 6.6 K/UL (ref 3.6–11)

## 2023-02-26 DIAGNOSIS — D50.9 IRON DEFICIENCY ANEMIA, UNSPECIFIED IRON DEFICIENCY ANEMIA TYPE: Primary | ICD-10-CM

## 2023-02-26 RX ORDER — LANOLIN ALCOHOL/MO/W.PET/CERES
325 CREAM (GRAM) TOPICAL EVERY OTHER DAY
Qty: 90 TABLET | Refills: 1 | Status: SHIPPED | OUTPATIENT
Start: 2023-02-26

## 2023-02-27 ENCOUNTER — OFFICE VISIT (OUTPATIENT)
Dept: FAMILY MEDICINE CLINIC | Age: 73
End: 2023-02-27
Payer: MEDICARE

## 2023-02-27 VITALS
HEIGHT: 68 IN | SYSTOLIC BLOOD PRESSURE: 168 MMHG | WEIGHT: 164.4 LBS | RESPIRATION RATE: 18 BRPM | HEART RATE: 59 BPM | TEMPERATURE: 98 F | OXYGEN SATURATION: 99 % | BODY MASS INDEX: 24.92 KG/M2 | DIASTOLIC BLOOD PRESSURE: 78 MMHG

## 2023-02-27 DIAGNOSIS — M17.0 PRIMARY OSTEOARTHRITIS OF BOTH KNEES: Primary | ICD-10-CM

## 2023-02-27 PROCEDURE — G9717 DOC PT DX DEP/BP F/U NT REQ: HCPCS | Performed by: FAMILY MEDICINE

## 2023-02-27 PROCEDURE — G8427 DOCREV CUR MEDS BY ELIG CLIN: HCPCS | Performed by: FAMILY MEDICINE

## 2023-02-27 PROCEDURE — 1123F ACP DISCUSS/DSCN MKR DOCD: CPT | Performed by: FAMILY MEDICINE

## 2023-02-27 PROCEDURE — 1090F PRES/ABSN URINE INCON ASSESS: CPT | Performed by: FAMILY MEDICINE

## 2023-02-27 PROCEDURE — G8400 PT W/DXA NO RESULTS DOC: HCPCS | Performed by: FAMILY MEDICINE

## 2023-02-27 PROCEDURE — 99213 OFFICE O/P EST LOW 20 MIN: CPT | Performed by: FAMILY MEDICINE

## 2023-02-27 PROCEDURE — 1101F PT FALLS ASSESS-DOCD LE1/YR: CPT | Performed by: FAMILY MEDICINE

## 2023-02-27 PROCEDURE — G8536 NO DOC ELDER MAL SCRN: HCPCS | Performed by: FAMILY MEDICINE

## 2023-02-27 PROCEDURE — G8417 CALC BMI ABV UP PARAM F/U: HCPCS | Performed by: FAMILY MEDICINE

## 2023-02-27 PROCEDURE — 3017F COLORECTAL CA SCREEN DOC REV: CPT | Performed by: FAMILY MEDICINE

## 2023-02-27 PROCEDURE — 3077F SYST BP >= 140 MM HG: CPT | Performed by: FAMILY MEDICINE

## 2023-02-27 PROCEDURE — 3078F DIAST BP <80 MM HG: CPT | Performed by: FAMILY MEDICINE

## 2023-02-27 NOTE — PROGRESS NOTES
[facility]    History of Present Illness:   Ayush Trujillo is a 67 y.o. female with history of HTN, Diabetes Mellitus, PAF, HLD, Insomnia, Iron Deficiency Anemia. CC: Knee pains  History provided by patient and Records    HPI:  Knee Pain:  Reports pain of the Bilateral medial side of the knee. Pain secondary to Chronic wear and tear that occurred during no injury. Overall symptoms are are worsening. Location: Bilateral medial, lateral  Duration: Pain/Injury started several weeks  ago. Pain lasts Constant and continues. Quality: aching in characteristic. Severity: Mild to severe 10/10 pains  Onset: insidious  Radiation: None  Other Symptoms: Noting Swelling. Denies redness, locking. Patient does not endorse weakness/tingling sensation. Modifying Factors: Pain is worsened by going up and down stairs. Pain is improved by heat and rest.  Previous remedies tried include Tlenol  Previous Injuries/Surgeries: None  Previous Imaging: None     Health Maintenance  Health Maintenance Due   Topic Date Due    COVID-19 Vaccine (1) Never done    Pneumococcal 65+ years (1 - PCV) Never done    DTaP/Tdap/Td series (1 - Tdap) Never done    Shingles Vaccine (1 of 2) Never done    Bone Densitometry (Dexa) Screening  Never done    Breast Cancer Screen Mammogram  01/10/2016    Foot Exam Q1  02/12/2020    Eye Exam Retinal or Dilated  02/11/2021    Colorectal Cancer Screening Combo  02/15/2022    Flu Vaccine (1) 08/01/2022       Past Medical, Family, and Social History:     Current Outpatient Medications on File Prior to Visit   Medication Sig Dispense Refill    ferrous sulfate 325 mg (65 mg iron) tablet Take 1 Tablet by mouth every other day.  90 Tablet 1    digoxin (LANOXIN) 0.125 mg tablet TAKE 1 TABLET DAILY 90 Tablet 3    cloNIDine HCL (CATAPRES) 0.2 mg tablet TAKE 1 TABLET TWICE A  Tablet 0    amLODIPine (NORVASC) 10 mg tablet TAKE 1 TABLET DAILY 90 Tablet 0    FLUoxetine (PROzac) 10 mg capsule TAKE 1 CAPSULE DAILY 90 Capsule 0    melatonin 5 mg cap capsule Take 2 Caps by mouth nightly. 90 Cap 3    miscellaneous medical supply (BLOOD PRESSURE CUFF) misc Use to take blood pressure as recommended by MD 1 Each 0    Blood-Glucose Meter (BLOOD GLUCOSE MONITORING) monitoring kit Check BS daily 1 Kit 0    cholecalciferol (VITAMIN D3) (1000 Units /25 mcg) tablet Take 1,000 Units by mouth daily. ASCORBIC ACID (VITAMIN C PO) Take 1 Tablet by mouth daily. VITAMIN E ACETATE (VITAMIN E PO) Take 1 Tab by mouth daily. No current facility-administered medications on file prior to visit. Patient Active Problem List   Diagnosis Code    Hypertension I10    Hypercholesterolemia E78.00    Sleep apnea G47.30    PAF (paroxysmal atrial fibrillation) ([facility]) I48.0    Status post gastric banding Z98.84    Glucose intolerance (impaired glucose tolerance) R73.02    Clinical depression F32. A    Type 2 diabetes with nephropathy ([facility]) E11.21    Iron deficiency anemia D50.9    Fracture of unspecified bones T14. 8XXA    Irregular heart rhythm I49.9    Drug-induced constipation K59.03    Primary insomnia F51.01    Cataract H26.9       Social History     Socioeconomic History    Marital status: SINGLE   Tobacco Use    Smoking status: Never    Smokeless tobacco: Never   Substance and Sexual Activity    Alcohol use: No    Drug use: No    Sexual activity: Never     Social Determinants of Health     Financial Resource Strain: Medium Risk    Difficulty of Paying Living Expenses: Somewhat hard   Food Insecurity: Food Insecurity Present    Worried About Running Out of Food in the Last Year: Sometimes true    Ran Out of Food in the Last Year: Never true   Transportation Needs: No Transportation Needs    Lack of Transportation (Medical): No    Lack of Transportation (Non-Medical): No   Housing Stability: Low Risk     Unable to Pay for Housing in the Last Year: No    Number of Places Lived in the Last Year: 1    Unstable Housing in the Last Year: No        Review of Systems   Review of Systems   Constitutional:  Negative for chills and fever. Eyes:  Negative for double vision and photophobia. Cardiovascular:  Negative for chest pain and palpitations. Gastrointestinal:  Negative for abdominal pain, nausea and vomiting. Musculoskeletal:  Positive for joint pain. Objective:   Visit Vitals  BP (!) 168/78 (BP 1 Location: Left arm, BP Patient Position: Sitting, BP Cuff Size: Adult)   Pulse (!) 59   Temp 98 °F (36.7 °C) (Oral)   Resp 18   Ht 5' 8\" (1.727 m)   Wt 164 lb 6.4 oz (74.6 kg)   SpO2 99%   BMI 25.00 kg/m²        Physical Exam  Vitals and nursing note reviewed. Constitutional:       Appearance: Normal appearance. HENT:      Head: Normocephalic and atraumatic. Cardiovascular:      Rate and Rhythm: Normal rate and regular rhythm. Pulses: Normal pulses. Heart sounds: Normal heart sounds. No murmur heard. No friction rub. No gallop. Pulmonary:      Effort: Pulmonary effort is normal.      Breath sounds: Normal breath sounds. Abdominal:      General: Abdomen is flat. Bowel sounds are normal.      Palpations: Abdomen is soft. Musculoskeletal:      Cervical back: Normal range of motion and neck supple. Comments: Bilateral knee swelling and tenderness at joint lines   Skin:     General: Skin is warm and dry. Neurological:      Mental Status: She is alert. Pertinent Labs/Studies:      Assessment and orders:       ICD-10-CM ICD-9-CM    1. Primary osteoarthritis of both knees  M17.0 715.16 XR KNEE LT 3 V      XR KNEE RT 3 V        Diagnoses and all orders for this visit:    1. Primary osteoarthritis of both knees: XR now and plan for Injection tomorrow. -     XR KNEE LT 3 V; Future  -     XR KNEE RT 3 V; Future    Follow-up and Dispositions    Return in about 1 day (around 2/28/2023) for Come in at 11:20.            I have discussed the diagnosis with the patient and the intended plan as seen in the above orders. Social history, medical history, and labs were reviewed. The patient has received an after-visit summary and questions were answered concerning future plans. I have discussed medication side effects and warnings with the patient as well.     MD [facility]  02/27/23

## 2023-02-28 ENCOUNTER — OFFICE VISIT (OUTPATIENT)
Dept: FAMILY MEDICINE CLINIC | Age: 73
End: 2023-02-28
Payer: MEDICARE

## 2023-02-28 VITALS
BODY MASS INDEX: 24.86 KG/M2 | TEMPERATURE: 99.2 F | WEIGHT: 164 LBS | HEART RATE: 77 BPM | DIASTOLIC BLOOD PRESSURE: 89 MMHG | HEIGHT: 68 IN | RESPIRATION RATE: 18 BRPM | OXYGEN SATURATION: 96 % | SYSTOLIC BLOOD PRESSURE: 176 MMHG

## 2023-02-28 DIAGNOSIS — M17.0 PRIMARY OSTEOARTHRITIS OF BOTH KNEES: Primary | ICD-10-CM

## 2023-02-28 PROCEDURE — 20610 DRAIN/INJ JOINT/BURSA W/O US: CPT | Performed by: FAMILY MEDICINE

## 2023-02-28 RX ORDER — TRIAMCINOLONE ACETONIDE 40 MG/ML
40 INJECTION, SUSPENSION INTRA-ARTICULAR; INTRAMUSCULAR ONCE
Qty: 1 ML | Refills: 0
Start: 2023-02-28 | End: 2023-02-28

## 2023-02-28 NOTE — PROGRESS NOTES
1. Have you been to the ER, urgent care clinic since your last visit? Hospitalized since your last visit? No    2. Have you seen or consulted any other health care providers outside of the [address] [place] [address] since your last visit? Including any pap smears or colon screening.  No      Health Maintenance Due   Topic Date Due    COVID-19 Vaccine (1) Never done    Pneumococcal 65+ years (1 - PCV) Never done    DTaP/Tdap/Td series (1 - Tdap) Never done    Shingles Vaccine (1 of 2) Never done    Bone Densitometry (Dexa) Screening  Never done    Breast Cancer Screen Mammogram  01/10/2016    Foot Exam Q1  02/12/2020    Eye Exam Retinal or Dilated  02/11/2021    Colorectal Cancer Screening Combo  02/15/2022    Flu Vaccine (1) 08/01/2022

## 2023-02-28 NOTE — PROGRESS NOTES
Clinic Procedure Note:    Procedure performed: Steroid injection of the bilateral knees    Indications: Symptom relief from osteoarthritis    Date of procedure: 02/28/23    Chart reviewed for the following:              Deloris Carrillo MD, have reviewed the History, Physical and updated the Allergic reactions for [address] performed immediately prior to start of procedure:              Deloris Carrillo MD, have performed the following reviews on [address] prior to the start of the procedure, see attached form in media. Procedure:  After consent was obtained, using sterile technique the Right and left knee joint was prepped using Chlorprep. Local anesthetic used: Cold spray. Kenalog 40 mg was mixed with 1% lidocaine 1 ml and  0.5% marcaine 1 ml and injected into the Right and left knee joints joint via the lateral approach bilaterally and the needle withdrawn. The procedure was well tolerated. The patient is asked to continue to rest the joint for a few more days before resuming regular activities. It may be more painful for the first 1-2 days. Watch for fever, or increased swelling or persistent pain in the joint. Call or return to clinic prn if such symptoms occur or there is failure to improve as anticipated. Procedure performed by:   George León MD  Provider assisted by: Nelly Giron LPN   Patient assisted by: self     How tolerated by patient: tolerated the procedure well with no complications    Comments: none    George León MD  Resident [facility]

## 2023-04-24 DIAGNOSIS — I10 ESSENTIAL HYPERTENSION: ICD-10-CM

## 2023-04-24 DIAGNOSIS — F33.41 RECURRENT MAJOR DEPRESSIVE DISORDER, IN PARTIAL REMISSION (HCC): ICD-10-CM

## 2023-04-24 RX ORDER — CLONIDINE HYDROCHLORIDE 0.2 MG/1
TABLET ORAL
Qty: 180 TABLET | Refills: 3 | Status: SHIPPED | OUTPATIENT
Start: 2023-04-24

## 2023-04-24 RX ORDER — FLUOXETINE 10 MG/1
CAPSULE ORAL
Qty: 90 CAPSULE | Refills: 3 | Status: SHIPPED | OUTPATIENT
Start: 2023-04-24

## 2023-04-24 RX ORDER — AMLODIPINE BESYLATE 10 MG/1
TABLET ORAL
Qty: 90 TABLET | Refills: 3 | Status: SHIPPED | OUTPATIENT
Start: 2023-04-24

## 2024-01-19 RX ORDER — DIGOXIN 125 MCG
125 TABLET ORAL DAILY
Qty: 30 TABLET | Refills: 0 | Status: SHIPPED | OUTPATIENT
Start: 2024-01-19

## 2024-04-22 RX ORDER — AMLODIPINE BESYLATE 10 MG/1
10 TABLET ORAL DAILY
Qty: 30 TABLET | Refills: 0 | Status: SHIPPED | OUTPATIENT
Start: 2024-04-22

## 2024-04-22 RX ORDER — FLUOXETINE 10 MG/1
10 CAPSULE ORAL DAILY
Qty: 30 CAPSULE | Refills: 0 | Status: SHIPPED | OUTPATIENT
Start: 2024-04-22

## 2024-04-22 RX ORDER — CLONIDINE HYDROCHLORIDE 0.2 MG/1
0.2 TABLET ORAL 2 TIMES DAILY
Qty: 60 TABLET | Refills: 0 | Status: SHIPPED | OUTPATIENT
Start: 2024-04-22

## 2024-06-10 ENCOUNTER — PREP FOR PROCEDURE (OUTPATIENT)
Age: 74
End: 2024-06-10

## 2024-06-10 ENCOUNTER — OFFICE VISIT (OUTPATIENT)
Age: 74
End: 2024-06-10
Payer: MEDICARE

## 2024-06-10 VITALS
HEIGHT: 66 IN | WEIGHT: 172 LBS | BODY MASS INDEX: 27.64 KG/M2 | HEART RATE: 80 BPM | SYSTOLIC BLOOD PRESSURE: 176 MMHG | DIASTOLIC BLOOD PRESSURE: 76 MMHG

## 2024-06-10 DIAGNOSIS — N39.46 MIXED STRESS AND URGE URINARY INCONTINENCE: Primary | ICD-10-CM

## 2024-06-10 LAB
BILIRUBIN, URINE, POC: NEGATIVE
BLOOD URINE, POC: NEGATIVE
GLUCOSE URINE, POC: NEGATIVE
KETONES, URINE, POC: NEGATIVE
LEUKOCYTE ESTERASE, URINE, POC: ABNORMAL
NITRITE, URINE, POC: NEGATIVE
PH, URINE, POC: 6 (ref 4.6–8)
PROTEIN,URINE, POC: 100
SPECIFIC GRAVITY, URINE, POC: 1.02 (ref 1–1.03)
URINALYSIS CLARITY, POC: ABNORMAL
URINALYSIS COLOR, POC: YELLOW
UROBILINOGEN, POC: ABNORMAL

## 2024-06-10 PROCEDURE — 3078F DIAST BP <80 MM HG: CPT | Performed by: STUDENT IN AN ORGANIZED HEALTH CARE EDUCATION/TRAINING PROGRAM

## 2024-06-10 PROCEDURE — 81003 URINALYSIS AUTO W/O SCOPE: CPT | Performed by: STUDENT IN AN ORGANIZED HEALTH CARE EDUCATION/TRAINING PROGRAM

## 2024-06-10 PROCEDURE — 3077F SYST BP >= 140 MM HG: CPT | Performed by: STUDENT IN AN ORGANIZED HEALTH CARE EDUCATION/TRAINING PROGRAM

## 2024-06-10 PROCEDURE — 1090F PRES/ABSN URINE INCON ASSESS: CPT | Performed by: STUDENT IN AN ORGANIZED HEALTH CARE EDUCATION/TRAINING PROGRAM

## 2024-06-10 PROCEDURE — G8427 DOCREV CUR MEDS BY ELIG CLIN: HCPCS | Performed by: STUDENT IN AN ORGANIZED HEALTH CARE EDUCATION/TRAINING PROGRAM

## 2024-06-10 PROCEDURE — 0509F URINE INCON PLAN DOCD: CPT | Performed by: STUDENT IN AN ORGANIZED HEALTH CARE EDUCATION/TRAINING PROGRAM

## 2024-06-10 PROCEDURE — 3017F COLORECTAL CA SCREEN DOC REV: CPT | Performed by: STUDENT IN AN ORGANIZED HEALTH CARE EDUCATION/TRAINING PROGRAM

## 2024-06-10 PROCEDURE — 1036F TOBACCO NON-USER: CPT | Performed by: STUDENT IN AN ORGANIZED HEALTH CARE EDUCATION/TRAINING PROGRAM

## 2024-06-10 PROCEDURE — 99204 OFFICE O/P NEW MOD 45 MIN: CPT | Performed by: STUDENT IN AN ORGANIZED HEALTH CARE EDUCATION/TRAINING PROGRAM

## 2024-06-10 PROCEDURE — G8419 CALC BMI OUT NRM PARAM NOF/U: HCPCS | Performed by: STUDENT IN AN ORGANIZED HEALTH CARE EDUCATION/TRAINING PROGRAM

## 2024-06-10 PROCEDURE — G8400 PT W/DXA NO RESULTS DOC: HCPCS | Performed by: STUDENT IN AN ORGANIZED HEALTH CARE EDUCATION/TRAINING PROGRAM

## 2024-06-10 PROCEDURE — 1123F ACP DISCUSS/DSCN MKR DOCD: CPT | Performed by: STUDENT IN AN ORGANIZED HEALTH CARE EDUCATION/TRAINING PROGRAM

## 2024-06-10 NOTE — PROGRESS NOTES
HISTORY OF PRESENT ILLNESS  Chely Malik is a 73 y.o. female.  Chief Complaint   Patient presents with    urinary incontinence       73-year-old female seen for mixed urinary incontinence.  She has bothersome urgency and frequency and has implemented timed voiding at home to help with this.  She urinates every 2 hours to keep her bladder empty.  Her most bothersome symptoms are at nighttime as she does not use timed voiding when sleeping.  She also has bothersome stress incontinence.  She has a history of morbid obesity and at one point weighed 300 pounds prior to undergoing a placement of a gastric band in the mid 2000's.  She has not seen a pelvic floor physical therapist but she has been educated on Kegels and has not had improvement when doing these exercises at home.        PAST MEDICAL HISTORY:  PMHx (including negatives):  has a past medical history of Arthritis, Depression, Diabetes ([facility]), Hypercholesterolemia, Hypertension, PAF (paroxysmal atrial fibrillation) ([facility]), Sleep apnea, and Status post gastric banding.   PSurgHx:  has a past surgical history that includes Hysterectomy (1995); gi (2006); Appendectomy; and XR Cardiac Cath Procedure.  PSocHx:  reports that she has never smoked. She has never used smokeless tobacco. She reports that she does not drink alcohol and does not use drugs.     Review of Systems      Physical Exam  Constitutional:       General: She is not in acute distress.     Appearance: She is not ill-appearing.   HENT:      Head: Normocephalic and atraumatic.   Eyes:      Extraocular Movements: Extraocular movements intact.      Pupils: Pupils are equal, round, and reactive to light.   Pulmonary:      Effort: Pulmonary effort is normal.   Musculoskeletal:         General: Normal range of motion.   Neurological:      General: No focal deficit present.      Mental Status: She is alert and oriented to person, place, and time.   Psychiatric:         Mood and Affect: Mood normal.

## 2024-06-10 NOTE — PROGRESS NOTES
Chief Complaint   Patient presents with    urinary incontinence     1. Have you been to the ER, urgent care clinic since your last visit?  Hospitalized since your last visit?No    2. Have you seen or consulted any other health care providers outside of the [facility] since your last visit?  Include any pap smears or colon screening. No  BP (!) 176/76   Pulse 80   Ht 1.676 m (5' 6\")   Wt 78 kg (172 lb)   BMI 27.76 kg/m²

## 2024-06-10 NOTE — ASSESSMENT & PLAN NOTE
Bothersome mixed urinary incontinence.  She has stress incontinence and utilizes multiple pads and diapers through out the day to help with this.  She also has urgency and has already implemented timed voiding and urinates every 2 hours to help manage her symptoms.  We discussed stress incontinence procedures including injection of bulking agents and placement of mid urethral slings but the patient is not interested in this at this time.  High concern for neurological side effects with anticholinergic medications that the patient wishes to avoid.  She is also not interested in pursuing Botox.  She is interested in sacral neuromodulation and wishes to proceed with a PNE in the operating room.  We discussed the risks and benefits and the patient wished to proceed at this time.

## 2024-06-13 LAB — BACTERIA UR CULT: ABNORMAL

## 2024-06-14 ENCOUNTER — TELEPHONE (OUTPATIENT)
Age: 74
End: 2024-06-14

## 2024-06-14 ENCOUNTER — HOSPITAL ENCOUNTER (OUTPATIENT)
Facility: HOSPITAL | Age: 74
End: 2024-06-14
Payer: MEDICARE

## 2024-06-14 ENCOUNTER — TELEPHONE (OUTPATIENT)
Facility: CLINIC | Age: 74
End: 2024-06-14

## 2024-06-14 VITALS
WEIGHT: 172 LBS | OXYGEN SATURATION: 99 % | HEIGHT: 67 IN | TEMPERATURE: 98.6 F | DIASTOLIC BLOOD PRESSURE: 62 MMHG | RESPIRATION RATE: 20 BRPM | BODY MASS INDEX: 27 KG/M2 | SYSTOLIC BLOOD PRESSURE: 142 MMHG | HEART RATE: 60 BPM

## 2024-06-14 LAB
EKG ATRIAL RATE: 468 BPM
EKG DIAGNOSIS: NORMAL
EKG Q-T INTERVAL: 428 MS
EKG QRS DURATION: 92 MS
EKG QTC CALCULATION (BAZETT): 420 MS
EKG R AXIS: -32 DEGREES
EKG T AXIS: 41 DEGREES
EKG VENTRICULAR RATE: 58 BPM
ERYTHROCYTE [DISTWIDTH] IN BLOOD BY AUTOMATED COUNT: 19.2 % (ref 11.5–14.5)
HCT VFR BLD AUTO: 26.4 % (ref 35–47)
HGB BLD-MCNC: 7.3 G/DL (ref 11.5–16)
MCH RBC QN AUTO: 19.4 PG (ref 26–34)
MCHC RBC AUTO-ENTMCNC: 27.7 G/DL (ref 30–36.5)
MCV RBC AUTO: 70 FL (ref 80–99)
NRBC # BLD: 0 K/UL (ref 0–0.01)
NRBC BLD-RTO: 0 PER 100 WBC
PLATELET # BLD AUTO: 239 K/UL (ref 150–400)
PMV BLD AUTO: 8.5 FL (ref 8.9–12.9)
RBC # BLD AUTO: 3.77 M/UL (ref 3.8–5.2)
WBC # BLD AUTO: 4.6 K/UL (ref 3.6–11)

## 2024-06-14 PROCEDURE — 93005 ELECTROCARDIOGRAM TRACING: CPT | Performed by: STUDENT IN AN ORGANIZED HEALTH CARE EDUCATION/TRAINING PROGRAM

## 2024-06-14 PROCEDURE — 36415 COLL VENOUS BLD VENIPUNCTURE: CPT

## 2024-06-14 PROCEDURE — 83036 HEMOGLOBIN GLYCOSYLATED A1C: CPT

## 2024-06-14 PROCEDURE — 85027 COMPLETE CBC AUTOMATED: CPT

## 2024-06-14 RX ORDER — MAGNESIUM 200 MG
200 TABLET ORAL DAILY
COMMUNITY

## 2024-06-14 RX ORDER — LEVOFLOXACIN 500 MG/1
500 TABLET, FILM COATED ORAL DAILY
Qty: 10 TABLET | Refills: 0 | Status: SHIPPED | OUTPATIENT
Start: 2024-06-14 | End: 2024-06-24

## 2024-06-14 RX ORDER — IBUPROFEN 400 MG/1
400 TABLET ORAL EVERY 6 HOURS PRN
COMMUNITY

## 2024-06-14 NOTE — PERIOP NOTE
Chart and EKG reviewed with Dr. Blackwell and informed him that patient stated repeatedly that she has a DNR is not going to go see a cardiologist.  Since we had already faxed a form for medical clearance he was okay with medical clearance stating Afib is chronic and she is taking medication-digoxin.    Hgb 7.3 and reviewed with Berana Rothman NP.  Stated due to nature of procedure there is zero to minimal blood loss expected and ordered a CBC and type and screen to be drawn DOS.

## 2024-06-14 NOTE — TELEPHONE ENCOUNTER
Received a medical clearance form for patient. Pt will need an appt for pre-op exam.  Called to advise and schedule pre-op.  No answer, left detailed message.

## 2024-06-15 LAB
EST. AVERAGE GLUCOSE BLD GHB EST-MCNC: 108 MG/DL
HBA1C MFR BLD: 5.4 % (ref 4–5.6)

## 2024-06-17 ENCOUNTER — OFFICE VISIT (OUTPATIENT)
Facility: CLINIC | Age: 74
End: 2024-06-17
Payer: MEDICARE

## 2024-06-17 VITALS
HEIGHT: 68 IN | DIASTOLIC BLOOD PRESSURE: 65 MMHG | OXYGEN SATURATION: 96 % | BODY MASS INDEX: 25.76 KG/M2 | TEMPERATURE: 97.8 F | RESPIRATION RATE: 18 BRPM | HEART RATE: 71 BPM | WEIGHT: 170 LBS | SYSTOLIC BLOOD PRESSURE: 154 MMHG

## 2024-06-17 DIAGNOSIS — D64.9 ANEMIA, UNSPECIFIED TYPE: ICD-10-CM

## 2024-06-17 DIAGNOSIS — I48.0 PAROXYSMAL ATRIAL FIBRILLATION (HCC): ICD-10-CM

## 2024-06-17 DIAGNOSIS — I10 ESSENTIAL (PRIMARY) HYPERTENSION: ICD-10-CM

## 2024-06-17 DIAGNOSIS — Z01.818 PREOPERATIVE CLEARANCE: Primary | ICD-10-CM

## 2024-06-17 DIAGNOSIS — E11.21 TYPE 2 DIABETES MELLITUS WITH DIABETIC NEPHROPATHY, WITHOUT LONG-TERM CURRENT USE OF INSULIN (HCC): ICD-10-CM

## 2024-06-17 PROCEDURE — 99214 OFFICE O/P EST MOD 30 MIN: CPT | Performed by: STUDENT IN AN ORGANIZED HEALTH CARE EDUCATION/TRAINING PROGRAM

## 2024-06-17 PROCEDURE — 2022F DILAT RTA XM EVC RTNOPTHY: CPT | Performed by: STUDENT IN AN ORGANIZED HEALTH CARE EDUCATION/TRAINING PROGRAM

## 2024-06-17 PROCEDURE — 3078F DIAST BP <80 MM HG: CPT | Performed by: STUDENT IN AN ORGANIZED HEALTH CARE EDUCATION/TRAINING PROGRAM

## 2024-06-17 PROCEDURE — 3044F HG A1C LEVEL LT 7.0%: CPT | Performed by: STUDENT IN AN ORGANIZED HEALTH CARE EDUCATION/TRAINING PROGRAM

## 2024-06-17 PROCEDURE — 1090F PRES/ABSN URINE INCON ASSESS: CPT | Performed by: STUDENT IN AN ORGANIZED HEALTH CARE EDUCATION/TRAINING PROGRAM

## 2024-06-17 PROCEDURE — 3077F SYST BP >= 140 MM HG: CPT | Performed by: STUDENT IN AN ORGANIZED HEALTH CARE EDUCATION/TRAINING PROGRAM

## 2024-06-17 PROCEDURE — G8419 CALC BMI OUT NRM PARAM NOF/U: HCPCS | Performed by: STUDENT IN AN ORGANIZED HEALTH CARE EDUCATION/TRAINING PROGRAM

## 2024-06-17 PROCEDURE — 1036F TOBACCO NON-USER: CPT | Performed by: STUDENT IN AN ORGANIZED HEALTH CARE EDUCATION/TRAINING PROGRAM

## 2024-06-17 PROCEDURE — G8427 DOCREV CUR MEDS BY ELIG CLIN: HCPCS | Performed by: STUDENT IN AN ORGANIZED HEALTH CARE EDUCATION/TRAINING PROGRAM

## 2024-06-17 PROCEDURE — G8400 PT W/DXA NO RESULTS DOC: HCPCS | Performed by: STUDENT IN AN ORGANIZED HEALTH CARE EDUCATION/TRAINING PROGRAM

## 2024-06-17 PROCEDURE — 3017F COLORECTAL CA SCREEN DOC REV: CPT | Performed by: STUDENT IN AN ORGANIZED HEALTH CARE EDUCATION/TRAINING PROGRAM

## 2024-06-17 PROCEDURE — 1123F ACP DISCUSS/DSCN MKR DOCD: CPT | Performed by: STUDENT IN AN ORGANIZED HEALTH CARE EDUCATION/TRAINING PROGRAM

## 2024-06-17 RX ORDER — AMLODIPINE BESYLATE 10 MG/1
10 TABLET ORAL DAILY
Qty: 90 TABLET | Refills: 0 | Status: SHIPPED | OUTPATIENT
Start: 2024-06-17

## 2024-06-17 RX ORDER — CLONIDINE HYDROCHLORIDE 0.2 MG/1
0.2 TABLET ORAL 2 TIMES DAILY
Qty: 180 TABLET | Refills: 0 | Status: SHIPPED | OUTPATIENT
Start: 2024-06-17

## 2024-06-17 RX ORDER — DIGOXIN 125 MCG
125 TABLET ORAL DAILY
Qty: 90 TABLET | Refills: 0 | Status: SHIPPED | OUTPATIENT
Start: 2024-06-17

## 2024-06-17 SDOH — ECONOMIC STABILITY: INCOME INSECURITY: HOW HARD IS IT FOR YOU TO PAY FOR THE VERY BASICS LIKE FOOD, HOUSING, MEDICAL CARE, AND HEATING?: NOT HARD AT ALL

## 2024-06-17 SDOH — ECONOMIC STABILITY: FOOD INSECURITY: WITHIN THE PAST 12 MONTHS, YOU WORRIED THAT YOUR FOOD WOULD RUN OUT BEFORE YOU GOT MONEY TO BUY MORE.: NEVER TRUE

## 2024-06-17 SDOH — ECONOMIC STABILITY: FOOD INSECURITY: WITHIN THE PAST 12 MONTHS, THE FOOD YOU BOUGHT JUST DIDN'T LAST AND YOU DIDN'T HAVE MONEY TO GET MORE.: NEVER TRUE

## 2024-06-17 SDOH — ECONOMIC STABILITY: HOUSING INSECURITY
IN THE LAST 12 MONTHS, WAS THERE A TIME WHEN YOU DID NOT HAVE A STEADY PLACE TO SLEEP OR SLEPT IN A SHELTER (INCLUDING NOW)?: NO

## 2024-06-17 ASSESSMENT — PATIENT HEALTH QUESTIONNAIRE - PHQ9
9. THOUGHTS THAT YOU WOULD BE BETTER OFF DEAD, OR OF HURTING YOURSELF: NOT AT ALL
SUM OF ALL RESPONSES TO PHQ QUESTIONS 1-9: 0
SUM OF ALL RESPONSES TO PHQ QUESTIONS 1-9: 0
2. FEELING DOWN, DEPRESSED OR HOPELESS: NOT AT ALL
SUM OF ALL RESPONSES TO PHQ QUESTIONS 1-9: 0
SUM OF ALL RESPONSES TO PHQ9 QUESTIONS 1 & 2: 0
3. TROUBLE FALLING OR STAYING ASLEEP: NOT AT ALL
4. FEELING TIRED OR HAVING LITTLE ENERGY: NOT AT ALL
8. MOVING OR SPEAKING SO SLOWLY THAT OTHER PEOPLE COULD HAVE NOTICED. OR THE OPPOSITE, BEING SO FIGETY OR RESTLESS THAT YOU HAVE BEEN MOVING AROUND A LOT MORE THAN USUAL: NOT AT ALL
5. POOR APPETITE OR OVEREATING: NOT AT ALL
6. FEELING BAD ABOUT YOURSELF - OR THAT YOU ARE A FAILURE OR HAVE LET YOURSELF OR YOUR FAMILY DOWN: NOT AT ALL
7. TROUBLE CONCENTRATING ON THINGS, SUCH AS READING THE NEWSPAPER OR WATCHING TELEVISION: NOT AT ALL
SUM OF ALL RESPONSES TO PHQ QUESTIONS 1-9: 0
10. IF YOU CHECKED OFF ANY PROBLEMS, HOW DIFFICULT HAVE THESE PROBLEMS MADE IT FOR YOU TO DO YOUR WORK, TAKE CARE OF THINGS AT HOME, OR GET ALONG WITH OTHER PEOPLE: NOT DIFFICULT AT ALL
1. LITTLE INTEREST OR PLEASURE IN DOING THINGS: NOT AT ALL

## 2024-06-17 NOTE — PERIOP NOTE
Called patient made aware her PCP needs her to be seen prior to providing Medical Clearance for planned surgery - patient states she will call PCP

## 2024-06-17 NOTE — PROGRESS NOTES
Chief Complaint   Patient presents with    Pre-op Exam       History of Present Illness:  Chely Malik is a 73 y.o. female w/ PMHx of mixed urinary incontinence, SAM, HTN, HLD, DM2, AFIB, YOLANDA, depression, knee OA, gastric banding who presents to clinic for pre-operative clearance.    - Pt follows with Dr. Mayorga ([facility]).  - Plan for implantation of neuro-stimulator bladder electrode in two days on 6/19/24.  - Hgb 7.3, MCV 70 on 6/14/24.  - Last CSY in 2012; records indicate follow-up recommended in 2022.    HTN:  - Pt prescribed amlodipine 10 mg daily, clonidine 0.2 mg BID.  - Last BMP on 2/24/23.    HLD:  - Last LDL 93.2 on 2/17/23.    DM2:  - Last A1c 5.4 (6/14/24).    AFIB:  - Last BMP on 2/24/23.  - Pt prescribed digoxin 125 mcg daily.  - Pt seen by Dr. Wood on 7/31/17.  - Previously declined anticoagulation.  - Last Echo (July 2017) demonstrated LVEF 65-70%, mildly increased wall thickness.    Depression:  - Pt prescribed Prozac 10 mg capsule.        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 after having 4 covid shots    PAF (paroxysmal atrial fibrillation) (HCC)     Sleep apnea     in the past, never had a cpap    Status post gastric banding        Current Outpatient Medications   Medication Sig Dispense Refill    amLODIPine (NORVASC) 10 MG tablet Take 1 tablet by mouth daily 90 tablet 0    cloNIDine (CATAPRES) 0.2 MG tablet Take 1 tablet by mouth 2 times daily 180 tablet 0    digoxin (LANOXIN) 125 MCG tablet Take 1 tablet by mouth daily 90 tablet 0    Acetaminophen (TYLENOL PO) Take 500 mg by mouth daily as needed      ibuprofen (ADVIL;MOTRIN) 400 MG tablet Take 1 tablet by mouth every 6 hours as needed for Pain      magnesium 200 MG TABS tablet Take 1 tablet by mouth daily      FLUoxetine (PROZAC) 10 MG

## 2024-06-18 ENCOUNTER — TELEPHONE (OUTPATIENT)
Facility: CLINIC | Age: 74
End: 2024-06-18

## 2024-06-18 LAB
ALBUMIN SERPL-MCNC: 3.4 G/DL (ref 3.5–5)
ALBUMIN/GLOB SERPL: 0.9 (ref 1.1–2.2)
ALP SERPL-CCNC: 100 U/L (ref 45–117)
ALT SERPL-CCNC: 21 U/L (ref 12–78)
ANION GAP SERPL CALC-SCNC: 4 MMOL/L (ref 5–15)
AST SERPL-CCNC: 13 U/L (ref 15–37)
BILIRUB SERPL-MCNC: 0.6 MG/DL (ref 0.2–1)
BUN SERPL-MCNC: 25 MG/DL (ref 6–20)
BUN/CREAT SERPL: 25 (ref 12–20)
CALCIUM SERPL-MCNC: 9.3 MG/DL (ref 8.5–10.1)
CHLORIDE SERPL-SCNC: 109 MMOL/L (ref 97–108)
CO2 SERPL-SCNC: 28 MMOL/L (ref 21–32)
CREAT SERPL-MCNC: 1 MG/DL (ref 0.55–1.02)
ERYTHROCYTE [DISTWIDTH] IN BLOOD BY AUTOMATED COUNT: 19.3 % (ref 11.5–14.5)
FERRITIN SERPL-MCNC: 20 NG/ML (ref 8–252)
FOLATE SERPL-MCNC: 5.2 NG/ML (ref 5–21)
GLOBULIN SER CALC-MCNC: 3.6 G/DL (ref 2–4)
GLUCOSE SERPL-MCNC: 82 MG/DL (ref 65–100)
HAPTOGLOB SERPL-MCNC: 87 MG/DL (ref 30–200)
HCT VFR BLD AUTO: 28.3 % (ref 35–47)
HGB BLD-MCNC: 7.5 G/DL (ref 11.5–16)
IRON SATN MFR SERPL: 3 % (ref 20–50)
IRON SERPL-MCNC: 14 UG/DL (ref 35–150)
LDH SERPL L TO P-CCNC: 189 U/L (ref 81–246)
MCH RBC QN AUTO: 19 PG (ref 26–34)
MCHC RBC AUTO-ENTMCNC: 26.5 G/DL (ref 30–36.5)
MCV RBC AUTO: 71.8 FL (ref 80–99)
NRBC # BLD: 0 K/UL (ref 0–0.01)
NRBC BLD-RTO: 0 PER 100 WBC
PERIPHERAL SMEAR, MD REVIEW: NORMAL
PLATELET # BLD AUTO: 282 K/UL (ref 150–400)
PMV BLD AUTO: 8.9 FL (ref 8.9–12.9)
POTASSIUM SERPL-SCNC: 3.9 MMOL/L (ref 3.5–5.1)
PROT SERPL-MCNC: 7 G/DL (ref 6.4–8.2)
RBC # BLD AUTO: 3.94 M/UL (ref 3.8–5.2)
RETICS # AUTO: 0.04 M/UL (ref 0.02–0.08)
RETICS/RBC NFR AUTO: 1.1 % (ref 0.7–2.1)
SODIUM SERPL-SCNC: 141 MMOL/L (ref 136–145)
TIBC SERPL-MCNC: 489 UG/DL (ref 250–450)
VIT B12 SERPL-MCNC: 320 PG/ML (ref 193–986)
WBC # BLD AUTO: 4.6 K/UL (ref 3.6–11)

## 2024-06-18 NOTE — TELEPHONE ENCOUNTER
Pt is scheduled for a procedure tomorrow. Please advise if pt has been cleared for this procedure.

## 2024-06-18 NOTE — PERIOP NOTE
Called patient's PCP - [facility] - requested clearance note to be faxed to PAT dept ASAP    Called patient -made aware we have not received note from PCP - patient states she was under the impression she would be cleared after Hgb resulted - made aware I will faxed request again

## 2024-06-18 NOTE — RESULT ENCOUNTER NOTE
Reviewed lab results:  - CMP acceptable.  Creatinine 1.0 (previously 0.88 about 1 year ago).  eGFR 59.  - Iron 14 (low), TIBC 489 (elevated), iron percent saturation 3 (low).  Consistent with iron deficiency.  - Ferritin 20 (borderline low).  - Hemoglobin 7.5 (previously 7.3 four days ago).  MCV remains low at 71.8.  WBC and PLT WNL.  - Vitamin B12 and folate levels WNL.  - LDH WNL.  - Reticulocyte count WNL.  - Haptoglobin level WNL.  - Peripheral smear notable for microcytic, hypochromic anemia with anisocytosis and ovalocytes.  Normal white blood cell morphology and differential.  Platelets are normal in number with normal morphology.  No evidence of dysgranulopoiesis or circulating immature cells.  - See phone note.

## 2024-06-18 NOTE — TELEPHONE ENCOUNTER
Reviewed lab results:  - CMP acceptable.  Creatinine 1.0 (previously 0.88 about 1 year ago).  eGFR 59.  - Iron 14 (low), TIBC 489 (elevated), iron percent saturation 3 (low).  Consistent with iron deficiency.  - Ferritin 20 (borderline low).  - Hemoglobin 7.5 (previously 7.3 four days ago).  MCV remains low at 71.8.  WBC and PLT WNL.  - Vitamin B12 and folate levels WNL.  - LDH WNL.  - Reticulocyte count WNL.  - Haptoglobin level WNL.  - Peripheral smear notable for microcytic, hypochromic anemia with anisocytosis and ovalocytes.  Normal white blood cell morphology and differential.  Platelets are normal in number with normal morphology.  No evidence of dysgranulopoiesis or circulating immature cells.    Called patient.    She states that her surgeon cleared her for procedure on 6/19/2024 as her hemoglobin level is stable from 4 days ago.  Discussed increased perioperative risk given anemia and comorbidities.  She states that she understands and would like to proceed.    Anemia consistent with SAM: Hemoglobin 7.5 (previously 7.3 four days ago).  MCV low.  Iron indices consistent with SAM.  Recommended follow-up for IV iron infusions after urologic procedure.  Patient stated that she would like to receive IV iron infusions.  Recommended continuing iron supplementation p.o.  Message sent to staff to obtain form to write prescription for IV iron infusions.  Notified patient that we will attempt to arrange for IV iron infusions in [place], but may need to be at the [facility] location depending on availability.

## 2024-06-19 ENCOUNTER — ANESTHESIA EVENT (OUTPATIENT)
Facility: HOSPITAL | Age: 74
End: 2024-06-19
Payer: MEDICARE

## 2024-06-19 ENCOUNTER — ANESTHESIA (OUTPATIENT)
Facility: HOSPITAL | Age: 74
End: 2024-06-19
Payer: MEDICARE

## 2024-06-19 ENCOUNTER — APPOINTMENT (OUTPATIENT)
Facility: HOSPITAL | Age: 74
End: 2024-06-19
Attending: STUDENT IN AN ORGANIZED HEALTH CARE EDUCATION/TRAINING PROGRAM
Payer: MEDICARE

## 2024-06-19 ENCOUNTER — TELEPHONE (OUTPATIENT)
Facility: CLINIC | Age: 74
End: 2024-06-19

## 2024-06-19 ENCOUNTER — HOSPITAL ENCOUNTER (OUTPATIENT)
Facility: HOSPITAL | Age: 74
Discharge: HOME | End: 2024-06-19
Attending: STUDENT IN AN ORGANIZED HEALTH CARE EDUCATION/TRAINING PROGRAM | Admitting: STUDENT IN AN ORGANIZED HEALTH CARE EDUCATION/TRAINING PROGRAM
Payer: MEDICARE

## 2024-06-19 VITALS
RESPIRATION RATE: 18 BRPM | DIASTOLIC BLOOD PRESSURE: 85 MMHG | HEART RATE: 53 BPM | TEMPERATURE: 97.6 F | SYSTOLIC BLOOD PRESSURE: 174 MMHG | OXYGEN SATURATION: 100 %

## 2024-06-19 PROBLEM — N39.46 MIXED INCONTINENCE URGE AND STRESS (MALE)(FEMALE): Status: ACTIVE | Noted: 2024-06-19

## 2024-06-19 LAB
ABO + RH BLD: NORMAL
BLOOD GROUP ANTIBODIES SERPL: NEGATIVE
ERYTHROCYTE [DISTWIDTH] IN BLOOD BY AUTOMATED COUNT: 19 % (ref 11.5–14.5)
HCT VFR BLD AUTO: 26.9 % (ref 35–47)
HGB BLD-MCNC: 7.5 G/DL (ref 11.5–16)
MCH RBC QN AUTO: 19.3 PG (ref 26–34)
MCHC RBC AUTO-ENTMCNC: 27.9 G/DL (ref 30–36.5)
MCV RBC AUTO: 69.3 FL (ref 80–99)
NRBC # BLD: 0 K/UL (ref 0–0.01)
NRBC BLD-RTO: 0 PER 100 WBC
PLATELET # BLD AUTO: 281 K/UL (ref 150–400)
PMV BLD AUTO: 8.4 FL (ref 8.9–12.9)
RBC # BLD AUTO: 3.88 M/UL (ref 3.8–5.2)
SPECIMEN EXP DATE BLD: NORMAL
WBC # BLD AUTO: 4.5 K/UL (ref 3.6–11)

## 2024-06-19 PROCEDURE — 3700000001 HC ADD 15 MINUTES (ANESTHESIA): Performed by: STUDENT IN AN ORGANIZED HEALTH CARE EDUCATION/TRAINING PROGRAM

## 2024-06-19 PROCEDURE — 76000 FLUOROSCOPY <1 HR PHYS/QHP: CPT

## 2024-06-19 PROCEDURE — C1778 LEAD, NEUROSTIMULATOR: HCPCS | Performed by: STUDENT IN AN ORGANIZED HEALTH CARE EDUCATION/TRAINING PROGRAM

## 2024-06-19 PROCEDURE — 3700000000 HC ANESTHESIA ATTENDED CARE: Performed by: STUDENT IN AN ORGANIZED HEALTH CARE EDUCATION/TRAINING PROGRAM

## 2024-06-19 PROCEDURE — 86850 RBC ANTIBODY SCREEN: CPT

## 2024-06-19 PROCEDURE — 64561 IMPLANT NEUROELECTRODES: CPT | Performed by: STUDENT IN AN ORGANIZED HEALTH CARE EDUCATION/TRAINING PROGRAM

## 2024-06-19 PROCEDURE — 6360000002 HC RX W HCPCS: Performed by: NURSE ANESTHETIST, CERTIFIED REGISTERED

## 2024-06-19 PROCEDURE — 7100000011 HC PHASE II RECOVERY - ADDTL 15 MIN: Performed by: STUDENT IN AN ORGANIZED HEALTH CARE EDUCATION/TRAINING PROGRAM

## 2024-06-19 PROCEDURE — 2709999900 HC NON-CHARGEABLE SUPPLY: Performed by: STUDENT IN AN ORGANIZED HEALTH CARE EDUCATION/TRAINING PROGRAM

## 2024-06-19 PROCEDURE — 2500000003 HC RX 250 WO HCPCS: Performed by: STUDENT IN AN ORGANIZED HEALTH CARE EDUCATION/TRAINING PROGRAM

## 2024-06-19 PROCEDURE — 86900 BLOOD TYPING SEROLOGIC ABO: CPT

## 2024-06-19 PROCEDURE — 6360000002 HC RX W HCPCS: Performed by: STUDENT IN AN ORGANIZED HEALTH CARE EDUCATION/TRAINING PROGRAM

## 2024-06-19 PROCEDURE — 3600000012 HC SURGERY LEVEL 2 ADDTL 15MIN: Performed by: STUDENT IN AN ORGANIZED HEALTH CARE EDUCATION/TRAINING PROGRAM

## 2024-06-19 PROCEDURE — 36415 COLL VENOUS BLD VENIPUNCTURE: CPT

## 2024-06-19 PROCEDURE — 86901 BLOOD TYPING SEROLOGIC RH(D): CPT

## 2024-06-19 PROCEDURE — 3600000002 HC SURGERY LEVEL 2 BASE: Performed by: STUDENT IN AN ORGANIZED HEALTH CARE EDUCATION/TRAINING PROGRAM

## 2024-06-19 PROCEDURE — 7100000000 HC PACU RECOVERY - FIRST 15 MIN: Performed by: STUDENT IN AN ORGANIZED HEALTH CARE EDUCATION/TRAINING PROGRAM

## 2024-06-19 PROCEDURE — 7100000001 HC PACU RECOVERY - ADDTL 15 MIN: Performed by: STUDENT IN AN ORGANIZED HEALTH CARE EDUCATION/TRAINING PROGRAM

## 2024-06-19 PROCEDURE — 7100000010 HC PHASE II RECOVERY - FIRST 15 MIN: Performed by: STUDENT IN AN ORGANIZED HEALTH CARE EDUCATION/TRAINING PROGRAM

## 2024-06-19 PROCEDURE — 2580000003 HC RX 258: Performed by: STUDENT IN AN ORGANIZED HEALTH CARE EDUCATION/TRAINING PROGRAM

## 2024-06-19 PROCEDURE — 85027 COMPLETE CBC AUTOMATED: CPT

## 2024-06-19 DEVICE — PNE LEAD IMPLANT KIT
Type: IMPLANTABLE DEVICE | Site: BACK | Status: FUNCTIONAL
Brand: AXONICS

## 2024-06-19 RX ORDER — GLYCOPYRROLATE 0.2 MG/ML
INJECTION INTRAMUSCULAR; INTRAVENOUS PRN
Status: DISCONTINUED | OUTPATIENT
Start: 2024-06-19 | End: 2024-06-19 | Stop reason: SDUPTHER

## 2024-06-19 RX ORDER — SODIUM CHLORIDE, SODIUM LACTATE, POTASSIUM CHLORIDE, CALCIUM CHLORIDE 600; 310; 30; 20 MG/100ML; MG/100ML; MG/100ML; MG/100ML
INJECTION, SOLUTION INTRAVENOUS CONTINUOUS
Status: DISCONTINUED | OUTPATIENT
Start: 2024-06-19 | End: 2024-06-19 | Stop reason: HOSPADM

## 2024-06-19 RX ORDER — CEPHALEXIN 500 MG/1
500 CAPSULE ORAL 2 TIMES DAILY
Qty: 10 CAPSULE | Refills: 0 | Status: SHIPPED | OUTPATIENT
Start: 2024-06-19 | End: 2024-06-24

## 2024-06-19 RX ORDER — LIDOCAINE HYDROCHLORIDE 10 MG/ML
INJECTION, SOLUTION INFILTRATION; PERINEURAL PRN
Status: DISCONTINUED | OUTPATIENT
Start: 2024-06-19 | End: 2024-06-19 | Stop reason: ALTCHOICE

## 2024-06-19 RX ORDER — FENTANYL CITRATE 50 UG/ML
INJECTION, SOLUTION INTRAMUSCULAR; INTRAVENOUS PRN
Status: DISCONTINUED | OUTPATIENT
Start: 2024-06-19 | End: 2024-06-19 | Stop reason: SDUPTHER

## 2024-06-19 RX ORDER — PROPOFOL 10 MG/ML
INJECTION, EMULSION INTRAVENOUS PRN
Status: DISCONTINUED | OUTPATIENT
Start: 2024-06-19 | End: 2024-06-19 | Stop reason: SDUPTHER

## 2024-06-19 RX ADMIN — PROPOFOL 20 MG: 10 INJECTION, EMULSION INTRAVENOUS at 11:57

## 2024-06-19 RX ADMIN — SODIUM CHLORIDE, POTASSIUM CHLORIDE, SODIUM LACTATE AND CALCIUM CHLORIDE: 600; 310; 30; 20 INJECTION, SOLUTION INTRAVENOUS at 11:39

## 2024-06-19 RX ADMIN — FENTANYL CITRATE 25 MCG: 50 INJECTION, SOLUTION INTRAMUSCULAR; INTRAVENOUS at 11:55

## 2024-06-19 RX ADMIN — PROPOFOL 20 MG: 10 INJECTION, EMULSION INTRAVENOUS at 12:02

## 2024-06-19 RX ADMIN — PROPOFOL 20 MG: 10 INJECTION, EMULSION INTRAVENOUS at 12:08

## 2024-06-19 RX ADMIN — FENTANYL CITRATE 25 MCG: 50 INJECTION, SOLUTION INTRAMUSCULAR; INTRAVENOUS at 11:48

## 2024-06-19 RX ADMIN — CEFAZOLIN SODIUM 2000 MG: 1 INJECTION, POWDER, FOR SOLUTION INTRAMUSCULAR; INTRAVENOUS at 11:39

## 2024-06-19 RX ADMIN — GLYCOPYRROLATE 0.2 MG: 0.2 INJECTION INTRAMUSCULAR; INTRAVENOUS at 11:39

## 2024-06-19 RX ADMIN — PROPOFOL 30 MG: 10 INJECTION, EMULSION INTRAVENOUS at 11:48

## 2024-06-19 ASSESSMENT — PAIN - FUNCTIONAL ASSESSMENT
PAIN_FUNCTIONAL_ASSESSMENT: 0-10
PAIN_FUNCTIONAL_ASSESSMENT: 0-10

## 2024-06-19 NOTE — OP NOTE
Operative Note      Patient: Chely Malik  YOB: 1950  MRN: [id number]    Date of Procedure: 6/19/2024    Pre-Op Diagnosis Codes:     * Mixed stress and urge urinary incontinence [N39.46]    Post-Op Diagnosis: Same       Procedure(s):  PERCUTANEOUS IMPLANTATION OF NEUROSTIMULATOR ELECTRODE ARRAY,  BILATERAL WITH FLUOROSCOPY    Surgeon(s):  Juan Alberto Mayorga MD    Assistant:   Surgical Assistant: Sonya Saleh    Anesthesia: General    Estimated Blood Loss (mL): Minimal    Complications: None    Specimens:   * No specimens in log *    Implants:  Implant Name Type Inv. Item Serial No.  Lot No. LRB No. Used Action   LEAD NERVE STIM KT PNE - [id number]  LEAD NERVE STIM KT PNE  [employer] [id number] Left 1 Implanted         Drains: * No LDAs found *    Findings:  Infection Present At Time Of Surgery (PATOS) (choose all levels that have infection present):  No infection present  Other Findings: Irvin and great toe response seen on both leads    Detailed Description of Procedure:   Findings: Percutaneously placed PNE lead in the left and right S3 foramen using fluoroscopic guidance    CPT Code 17781- (modifier 50 for bilateral implantation)    Detailed Description of Procedure:   The patient was taken to the operating room and positioned in the prone position on the table.  His pressure points were padded and anesthesia was administered.  A timeout was performed.  Using a spinal needle the medial portion of the S3 foramen was identified using fluoroscopy.  The needle entry points were marked using a surgical marking pen.  Quarter percent Marcaine was used to anesthetize the skin and subcutaneous tissue.  Under fluoroscopic guidance the spinal needle was advanced into the S3 foramen on the right side.  Multiple fluoroscopic images were obtained in the PA and lateral view.  A separate spinal needle was then used for the left side and was placed into the left S3

## 2024-06-19 NOTE — PROGRESS NOTES
PT ASSISTED TO BR , VOIDED , IV DC'D SITE INTACT NO SWELLING NOTED , DR HENSON IN TO TALK WITH PT , DISCHARGE INSTRUCTIONS GIVEN TO PT , SHE  VERBALIZED UNDERSTANDING , NO DISTRESS NOTED

## 2024-06-19 NOTE — TELEPHONE ENCOUNTER
Pt has surgery today. Hospital is waiting for the Dr to fax over a surgical clearance note. Please send to [phone] asap.

## 2024-06-19 NOTE — ANESTHESIA PRE PROCEDURE
Department of Anesthesiology  Preprocedure Note       Name:  Chely Malik   Age:  73 y.o.  :  1950                                          MRN:  [id number]         Date:  2024      Surgeon: Surgeon(s):  Juan Alberto Mayorga MD    Procedure: Procedure(s):  PERCUTANEOUS IMPLANTATION OF NEUROSTIMULATOR ELECTRODE ARRAY,  BILATERAL WITH FLUOROSCOPY    Medications prior to admission:   Prior to Admission medications    Medication Sig Start Date End Date Taking? Authorizing Provider   amLODIPine (NORVASC) 10 MG tablet Take 1 tablet by mouth daily 24   Lazaro Stovall MD   cloNIDine (CATAPRES) 0.2 MG tablet Take 1 tablet by mouth 2 times daily 24   Lazaro Stovall MD   digoxin (LANOXIN) 125 MCG tablet Take 1 tablet by mouth daily 24   Lazaro Stovall MD   levoFLOXacin (LEVAQUIN) 500 MG tablet Take 1 tablet by mouth daily for 10 days  Patient not taking: Reported on 2024  Juan Alberto Mayorga MD   Acetaminophen (TYLENOL PO) Take 500 mg by mouth daily as needed    ProviderMerrill MD   ibuprofen (ADVIL;MOTRIN) 400 MG tablet Take 1 tablet by mouth every 6 hours as needed for Pain    Provider, MD Merrill   magnesium 200 MG TABS tablet Take 1 tablet by mouth daily    Provider, MD Merrill   FLUoxetine (PROZAC) 10 MG capsule Take 1 capsule by mouth daily APPOINTMENT REQUIRED FOR ADDITIONAL REFILL. 24   George Shelton MD   vitamin D (CHOLECALCIFEROL) 25 MCG (1000 UT) TABS tablet Take 1 tablet by mouth daily    Automatic Reconciliation, Ar   ferrous sulfate (IRON 325) 325 (65 Fe) MG tablet Take 1 tablet by mouth every other day 23   Automatic Reconciliation, Ar       Current medications:    Current Facility-Administered Medications   Medication Dose Route Frequency Provider Last Rate Last Admin    lactated ringers IV soln infusion   IntraVENous Continuous Juan Alberto Mayorga MD        ceFAZolin (ANCEF) 2,000 mg in sterile water 20 mL IV syringe  2,000 mg

## 2024-06-19 NOTE — PROGRESS NOTES
Spoke with Carol Lima in the office and she was to fax surgical clearance to our fax [phone] as a high priority ASAP.

## 2024-06-19 NOTE — DISCHARGE INSTRUCTIONS
Post-Operative Instructions After Neuromodulator Implantation    Surgical Site  Blood spotting from the incision is expected and not cause for alarm. All stitches will dissolve on their own.  You may apply ice packs to the incision area as needed for comfort for the first two days.  You can shower approximately 24 hours after your surgery but avoid scrubbing the surgical site. Do not submerge underwater for 2 weeks (no bathtub or swimming pool).    Diet and Activity  You may return to your normal diet and light activity, as tolerated, after surgery but avoid driving while on narcotic pain medications.  Avoid deep bending at the waist for several days.  Your recovery period can be up to 4 weeks concerning strenuous exercise or heavy lifting.  Talk with your doctor at your follow up appointment to review any restrictions.    Medications  You may receive a prescription for pain medication in addition to using Extra Strength Tylenol.  Prescription pain medicaitons can cause constipation so you may need to use over-the-counter stool softeners or laxatives as needed (Colace, Senna, Miralax).  Please avoid any Aspirin, Advil, Motrin, Aleve, ibuprofen, or any other blood thinners after the procedure for at least two to three days.    Problems you should report and when to call the doctor  Fevers > 101.  Any pain not controlled by pain medication provided.  Uncontrolled bleeding from the incision.  Spreading redness from incision.  Separation (opening) of the incision.  Any concerns or problems, please contact us at [phone].    Follow-up  You should be scheduled for a post-operative appointment about 2 weeks after your surgery.  Please call [phone] to confirm time and location if you are unsure.

## 2024-06-19 NOTE — ANESTHESIA POSTPROCEDURE EVALUATION
Department of Anesthesiology  Postprocedure Note    Patient: Chely Malik  MRN: [id number]  YOB: 1950  Date of evaluation: 6/19/2024    Procedure Summary       Date: 06/19/24 Room / Location: [facility] MAIN OR 06 / [facility] MAIN OR    Anesthesia Start: 1139 Anesthesia Stop: 1232    Procedure: PERCUTANEOUS IMPLANTATION OF NEUROSTIMULATOR ELECTRODE ARRAY,  BILATERAL WITH FLUOROSCOPY (Bilateral: Back) Diagnosis:       Mixed stress and urge urinary incontinence      (Mixed stress and urge urinary incontinence [N39.46])    Surgeons: Juan Alberto Mayorga MD Responsible Provider: Rashawn Palacios Jr., MD    Anesthesia Type: MAC ASA Status: 3            Anesthesia Type: MAC    Linda Phase I: Linda Score: 10    Linda Phase II:      Anesthesia Post Evaluation    Patient location during evaluation: bedside  Patient participation: complete - patient participated  Level of consciousness: awake  Pain score: 0  Airway patency: patent  Nausea & Vomiting: no vomiting and no nausea  Cardiovascular status: blood pressure returned to baseline  Respiratory status: acceptable  Hydration status: stable  Pain management: adequate    No notable events documented.

## 2024-06-19 NOTE — H&P
UROLOGY HISTORY AND PHYSICAL  Juan Alberto Mayorga MD  [phone] Office    Patient: Chely Malik MRN: [id number]  SSN: [id number]    YOB: 1950  Age: 73 y.o.  Sex: female          Date of Encounter:  June 19, 2024  Chief Complaint:  Mixed Urinary Incontinence             History of Present Illness:  Patient is a 73 y.o. female here for surgery.    She has bothersome mixed urinary incontinence. She wishes to proceed with sacral neuromodulation to help with incontinence. She has had no success with prior treatments.        Past Medical History:  Allergies   Allergen Reactions    Aspirin     Statins Myalgia and Other (See Comments)    Sulfa Antibiotics Hives     Other reaction(s): unknown    Tetanus Toxoids Other (See Comments)     Local reaction--arm red and swollen      Prior to Admission medications    Medication Sig Start Date End Date Taking? Authorizing Provider   amLODIPine (NORVASC) 10 MG tablet Take 1 tablet by mouth daily 6/17/24   Lazaro Stovall MD   cloNIDine (CATAPRES) 0.2 MG tablet Take 1 tablet by mouth 2 times daily 6/17/24   Lazaro Stovall MD   digoxin (LANOXIN) 125 MCG tablet Take 1 tablet by mouth daily 6/17/24   Lazaro Stovall MD   levoFLOXacin (LEVAQUIN) 500 MG tablet Take 1 tablet by mouth daily for 10 days  Patient not taking: Reported on 6/14/2024 6/14/24 6/24/24  Juan Alberto Mayorga MD   Acetaminophen (TYLENOL PO) Take 500 mg by mouth daily as needed    ProviderMerrill MD   ibuprofen (ADVIL;MOTRIN) 400 MG tablet Take 1 tablet by mouth every 6 hours as needed for Pain    ProviderMerrill MD   magnesium 200 MG TABS tablet Take 1 tablet by mouth daily    ProviderMerrill MD   FLUoxetine (PROZAC) 10 MG capsule Take 1 capsule by mouth daily APPOINTMENT REQUIRED FOR ADDITIONAL REFILL. 4/22/24   George Shelton MD   vitamin D (CHOLECALCIFEROL) 25 MCG (1000 UT) TABS tablet Take 1 tablet by mouth daily    Automatic Reconciliation, Ar   ferrous sulfate

## 2024-06-19 NOTE — PROGRESS NOTES
Verified  with  pt  no  diabetes  no  meds  for  DM   had   lap band  years  ago  and  no  DM   since  labs  in  system

## 2024-06-20 PROBLEM — N39.46 MIXED INCONTINENCE URGE AND STRESS (MALE)(FEMALE): Status: RESOLVED | Noted: 2024-06-19 | Resolved: 2024-06-20

## 2024-06-24 ENCOUNTER — OFFICE VISIT (OUTPATIENT)
Age: 74
End: 2024-06-24
Payer: MEDICARE

## 2024-06-24 VITALS
HEIGHT: 68 IN | DIASTOLIC BLOOD PRESSURE: 63 MMHG | WEIGHT: 170 LBS | HEART RATE: 59 BPM | BODY MASS INDEX: 25.76 KG/M2 | SYSTOLIC BLOOD PRESSURE: 161 MMHG

## 2024-06-24 DIAGNOSIS — N39.46 MIXED STRESS AND URGE URINARY INCONTINENCE: Primary | ICD-10-CM

## 2024-06-24 PROCEDURE — 99213 OFFICE O/P EST LOW 20 MIN: CPT | Performed by: STUDENT IN AN ORGANIZED HEALTH CARE EDUCATION/TRAINING PROGRAM

## 2024-06-24 PROCEDURE — G8419 CALC BMI OUT NRM PARAM NOF/U: HCPCS | Performed by: STUDENT IN AN ORGANIZED HEALTH CARE EDUCATION/TRAINING PROGRAM

## 2024-06-24 PROCEDURE — 0509F URINE INCON PLAN DOCD: CPT | Performed by: STUDENT IN AN ORGANIZED HEALTH CARE EDUCATION/TRAINING PROGRAM

## 2024-06-24 PROCEDURE — 3017F COLORECTAL CA SCREEN DOC REV: CPT | Performed by: STUDENT IN AN ORGANIZED HEALTH CARE EDUCATION/TRAINING PROGRAM

## 2024-06-24 PROCEDURE — 1036F TOBACCO NON-USER: CPT | Performed by: STUDENT IN AN ORGANIZED HEALTH CARE EDUCATION/TRAINING PROGRAM

## 2024-06-24 PROCEDURE — G8427 DOCREV CUR MEDS BY ELIG CLIN: HCPCS | Performed by: STUDENT IN AN ORGANIZED HEALTH CARE EDUCATION/TRAINING PROGRAM

## 2024-06-24 PROCEDURE — 1090F PRES/ABSN URINE INCON ASSESS: CPT | Performed by: STUDENT IN AN ORGANIZED HEALTH CARE EDUCATION/TRAINING PROGRAM

## 2024-06-24 PROCEDURE — 3077F SYST BP >= 140 MM HG: CPT | Performed by: STUDENT IN AN ORGANIZED HEALTH CARE EDUCATION/TRAINING PROGRAM

## 2024-06-24 PROCEDURE — 3078F DIAST BP <80 MM HG: CPT | Performed by: STUDENT IN AN ORGANIZED HEALTH CARE EDUCATION/TRAINING PROGRAM

## 2024-06-24 PROCEDURE — 1123F ACP DISCUSS/DSCN MKR DOCD: CPT | Performed by: STUDENT IN AN ORGANIZED HEALTH CARE EDUCATION/TRAINING PROGRAM

## 2024-06-24 PROCEDURE — G8400 PT W/DXA NO RESULTS DOC: HCPCS | Performed by: STUDENT IN AN ORGANIZED HEALTH CARE EDUCATION/TRAINING PROGRAM

## 2024-06-24 NOTE — PROGRESS NOTES
HISTORY OF PRESENT ILLNESS  Chely Malik is a 73 y.o. female.  Chief Complaint   Patient presents with    Follow Up After Procedure       73-year-old female who returns for removal of [employer] PNE leads.  She has had significant improvement in her symptoms since initiation of the trial.  Her left PNE lead was the primary source of success.  Over the 5-day trial she had 3 episodes of leakage and greater than 50% reduction in the number of voids per day.        PAST MEDICAL HISTORY:  PMHx (including negatives):  has a past medical history of Anemia, Arthritis, Depression, Diabetes (HCC), Hypercholesterolemia, Hypertension, Leukemia (HCC), PAF (paroxysmal atrial fibrillation) ([facility]), Sleep apnea, and Status post gastric banding.   PSurgHx:  has a past surgical history that includes Hysterectomy (1995); gi (2006); Appendectomy; XR Cardiac Cath Procedure; eye surgery (Bilateral); Colonoscopy; and Pain management procedure (Bilateral, 6/19/2024).  PSocHx:  reports that she has never smoked. She has never used smokeless tobacco. She reports that she does not drink alcohol and does not use drugs.     Review of Systems      Physical Exam  Constitutional:       General: She is not in acute distress.     Appearance: She is not ill-appearing.   HENT:      Head: Normocephalic and atraumatic.   Eyes:      Extraocular Movements: Extraocular movements intact.      Pupils: Pupils are equal, round, and reactive to light.   Pulmonary:      Effort: Pulmonary effort is normal.   Abdominal:      Comments: PNE leads removed without issue.   Musculoskeletal:         General: Normal range of motion.   Neurological:      General: No focal deficit present.      Mental Status: She is alert and oriented to person, place, and time.   Psychiatric:         Mood and Affect: Mood normal.         ASSESSMENT AND PLAN      1. Mixed stress and urge urinary incontinence  Overview:  Mixed incontinence.  No improvement with Kegels.  She is s/p PNE on

## 2024-06-24 NOTE — PROGRESS NOTES
Chief Complaint   Patient presents with    Follow Up After Procedure     1. Have you been to the ER, urgent care clinic since your last visit?  Hospitalized since your last visit?No    2. Have you seen or consulted any other health care providers outside of the [facility] since your last visit?  Include any pap smears or colon screening. No    BP (!) 161/63 (Site: Right Upper Arm, Position: Sitting, Cuff Size: Medium Adult)   Pulse 59   Ht 1.727 m (5' 8\")   Wt 77.1 kg (170 lb)   BMI 25.85 kg/m²

## 2024-06-24 NOTE — ASSESSMENT & PLAN NOTE
Greater than 50% improvement during PNE trial.  We discussed risks and benefits of proceeding with the full Kensho sacral neuromodulation device and the patient wishes to proceed at this time.  We will plan for placement of the lead in the left S3 foramen.  She does have a known hemoglobin level less than 8 which is likely due to anemia of chronic disease/iron deficiency anemia.  This places her at a higher risk for the procedure however the risk of bleeding is minimal and we will plan to proceed at this time.  
Universal Safety Interventions

## 2024-07-03 NOTE — DISCHARGE INSTRUCTIONS
Post-Operative Instructions After Neuromodulator Implantation    Surgical Site  Blood spotting from the incision is expected and not cause for alarm. All stitches will dissolve on their own.  You may apply ice packs to the incision area as needed for comfort for the first two days.  You can shower approximately 24 hours after your surgery but avoid scrubbing the surgical site. Do not submerge underwater for 2 weeks (no bathtub or swimming pool).    Diet and Activity  You may return to your normal diet and light activity, as tolerated, after surgery but avoid driving while on narcotic pain medications.  Avoid deep bending at the waist for several days.  Your recovery period can be up to 4 weeks concerning strenuous exercise or heavy lifting.  Talk with your doctor at your follow up appointment to review any restrictions.    Medications  You may receive a prescription for pain medication in addition to using Extra Strength Tylenol.  Prescription pain medicaitons can cause constipation so you may need to use over-the-counter stool softeners or laxatives as needed (Colace, Senna, Miralax).  Please avoid any Aspirin, Advil, Motrin, Aleve, ibuprofen, or any other blood thinners after the procedure for at least two to three days.    Problems you should report and when to call the doctor  Fevers > 101.  Any pain not controlled by pain medication provided.  Uncontrolled bleeding from the incision.  Spreading redness from incision.  Separation (opening) of the incision.  Any concerns or problems, please contact us at [phone].    Follow-up  You should be scheduled for a post-operative appointment about 2 weeks after your surgery.  Please call [phone] to confirm time and location if you are unsure.

## 2024-07-10 ENCOUNTER — ANESTHESIA EVENT (OUTPATIENT)
Facility: HOSPITAL | Age: 74
End: 2024-07-10
Payer: MEDICARE

## 2024-07-10 ENCOUNTER — ANESTHESIA (OUTPATIENT)
Facility: HOSPITAL | Age: 74
End: 2024-07-10
Payer: MEDICARE

## 2024-07-10 ENCOUNTER — APPOINTMENT (OUTPATIENT)
Facility: HOSPITAL | Age: 74
End: 2024-07-10
Attending: STUDENT IN AN ORGANIZED HEALTH CARE EDUCATION/TRAINING PROGRAM
Payer: MEDICARE

## 2024-07-10 ENCOUNTER — HOSPITAL ENCOUNTER (OUTPATIENT)
Facility: HOSPITAL | Age: 74
Discharge: HOME | End: 2024-07-10
Attending: STUDENT IN AN ORGANIZED HEALTH CARE EDUCATION/TRAINING PROGRAM | Admitting: STUDENT IN AN ORGANIZED HEALTH CARE EDUCATION/TRAINING PROGRAM
Payer: MEDICARE

## 2024-07-10 VITALS
BODY MASS INDEX: 26.68 KG/M2 | WEIGHT: 170 LBS | RESPIRATION RATE: 18 BRPM | HEART RATE: 63 BPM | HEIGHT: 67 IN | OXYGEN SATURATION: 97 % | TEMPERATURE: 97.5 F | SYSTOLIC BLOOD PRESSURE: 147 MMHG | DIASTOLIC BLOOD PRESSURE: 70 MMHG

## 2024-07-10 DIAGNOSIS — N39.46 MIXED INCONTINENCE URGE AND STRESS (MALE)(FEMALE): Primary | ICD-10-CM

## 2024-07-10 LAB
GLUCOSE BLD STRIP.AUTO-MCNC: 101 MG/DL (ref 65–100)
PERFORMED BY:: ABNORMAL

## 2024-07-10 PROCEDURE — 3700000001 HC ADD 15 MINUTES (ANESTHESIA): Performed by: STUDENT IN AN ORGANIZED HEALTH CARE EDUCATION/TRAINING PROGRAM

## 2024-07-10 PROCEDURE — 2500000003 HC RX 250 WO HCPCS: Performed by: STUDENT IN AN ORGANIZED HEALTH CARE EDUCATION/TRAINING PROGRAM

## 2024-07-10 PROCEDURE — 7100000001 HC PACU RECOVERY - ADDTL 15 MIN: Performed by: STUDENT IN AN ORGANIZED HEALTH CARE EDUCATION/TRAINING PROGRAM

## 2024-07-10 PROCEDURE — 6360000002 HC RX W HCPCS: Performed by: NURSE ANESTHETIST, CERTIFIED REGISTERED

## 2024-07-10 PROCEDURE — 2709999900 HC NON-CHARGEABLE SUPPLY: Performed by: STUDENT IN AN ORGANIZED HEALTH CARE EDUCATION/TRAINING PROGRAM

## 2024-07-10 PROCEDURE — 64590 INS/RPL PRPH SAC/GSTR NPG/R: CPT | Performed by: STUDENT IN AN ORGANIZED HEALTH CARE EDUCATION/TRAINING PROGRAM

## 2024-07-10 PROCEDURE — 2580000003 HC RX 258: Performed by: STUDENT IN AN ORGANIZED HEALTH CARE EDUCATION/TRAINING PROGRAM

## 2024-07-10 PROCEDURE — 82962 GLUCOSE BLOOD TEST: CPT

## 2024-07-10 PROCEDURE — 76000 FLUOROSCOPY <1 HR PHYS/QHP: CPT

## 2024-07-10 PROCEDURE — C1787 PATIENT PROGR, NEUROSTIM: HCPCS | Performed by: STUDENT IN AN ORGANIZED HEALTH CARE EDUCATION/TRAINING PROGRAM

## 2024-07-10 PROCEDURE — C1778 LEAD, NEUROSTIMULATOR: HCPCS | Performed by: STUDENT IN AN ORGANIZED HEALTH CARE EDUCATION/TRAINING PROGRAM

## 2024-07-10 PROCEDURE — 64561 IMPLANT NEUROELECTRODES: CPT | Performed by: STUDENT IN AN ORGANIZED HEALTH CARE EDUCATION/TRAINING PROGRAM

## 2024-07-10 PROCEDURE — C1897 LEAD, NEUROSTIM TEST KIT: HCPCS | Performed by: STUDENT IN AN ORGANIZED HEALTH CARE EDUCATION/TRAINING PROGRAM

## 2024-07-10 PROCEDURE — 3700000000 HC ANESTHESIA ATTENDED CARE: Performed by: STUDENT IN AN ORGANIZED HEALTH CARE EDUCATION/TRAINING PROGRAM

## 2024-07-10 PROCEDURE — 7100000000 HC PACU RECOVERY - FIRST 15 MIN: Performed by: STUDENT IN AN ORGANIZED HEALTH CARE EDUCATION/TRAINING PROGRAM

## 2024-07-10 PROCEDURE — 6360000002 HC RX W HCPCS: Performed by: STUDENT IN AN ORGANIZED HEALTH CARE EDUCATION/TRAINING PROGRAM

## 2024-07-10 PROCEDURE — 7100000011 HC PHASE II RECOVERY - ADDTL 15 MIN: Performed by: STUDENT IN AN ORGANIZED HEALTH CARE EDUCATION/TRAINING PROGRAM

## 2024-07-10 PROCEDURE — C1820 GENERATOR NEURO RECHG BAT SY: HCPCS | Performed by: STUDENT IN AN ORGANIZED HEALTH CARE EDUCATION/TRAINING PROGRAM

## 2024-07-10 PROCEDURE — 3600000014 HC SURGERY LEVEL 4 ADDTL 15MIN: Performed by: STUDENT IN AN ORGANIZED HEALTH CARE EDUCATION/TRAINING PROGRAM

## 2024-07-10 PROCEDURE — 7100000010 HC PHASE II RECOVERY - FIRST 15 MIN: Performed by: STUDENT IN AN ORGANIZED HEALTH CARE EDUCATION/TRAINING PROGRAM

## 2024-07-10 PROCEDURE — 3600000004 HC SURGERY LEVEL 4 BASE: Performed by: STUDENT IN AN ORGANIZED HEALTH CARE EDUCATION/TRAINING PROGRAM

## 2024-07-10 DEVICE — TINED LEAD KIT
Type: IMPLANTABLE DEVICE | Site: BACK | Status: FUNCTIONAL
Brand: AXONICS

## 2024-07-10 DEVICE — NEUROSTIMULATOR
Type: IMPLANTABLE DEVICE | Site: BACK | Status: FUNCTIONAL
Brand: AXONICS

## 2024-07-10 RX ORDER — TRAMADOL HYDROCHLORIDE 50 MG/1
50 TABLET ORAL EVERY 4 HOURS PRN
Qty: 18 TABLET | Refills: 0 | Status: SHIPPED | OUTPATIENT
Start: 2024-07-10 | End: 2024-07-13

## 2024-07-10 RX ORDER — SODIUM CHLORIDE 0.9 % (FLUSH) 0.9 %
5-40 SYRINGE (ML) INJECTION EVERY 12 HOURS SCHEDULED
Status: DISCONTINUED | OUTPATIENT
Start: 2024-07-10 | End: 2024-07-10 | Stop reason: HOSPADM

## 2024-07-10 RX ORDER — GLYCOPYRROLATE 0.2 MG/ML
INJECTION INTRAMUSCULAR; INTRAVENOUS PRN
Status: DISCONTINUED | OUTPATIENT
Start: 2024-07-10 | End: 2024-07-10 | Stop reason: SDUPTHER

## 2024-07-10 RX ORDER — FENTANYL CITRATE 50 UG/ML
25 INJECTION, SOLUTION INTRAMUSCULAR; INTRAVENOUS EVERY 5 MIN PRN
Status: DISCONTINUED | OUTPATIENT
Start: 2024-07-10 | End: 2024-07-10 | Stop reason: HOSPADM

## 2024-07-10 RX ORDER — DEXTROSE MONOHYDRATE 100 MG/ML
INJECTION, SOLUTION INTRAVENOUS CONTINUOUS PRN
Status: DISCONTINUED | OUTPATIENT
Start: 2024-07-10 | End: 2024-07-10 | Stop reason: HOSPADM

## 2024-07-10 RX ORDER — GLUCAGON 1 MG/ML
1 KIT INJECTION PRN
Status: DISCONTINUED | OUTPATIENT
Start: 2024-07-10 | End: 2024-07-10 | Stop reason: HOSPADM

## 2024-07-10 RX ORDER — LIDOCAINE HYDROCHLORIDE 10 MG/ML
INJECTION, SOLUTION INFILTRATION; PERINEURAL PRN
Status: DISCONTINUED | OUTPATIENT
Start: 2024-07-10 | End: 2024-07-10 | Stop reason: ALTCHOICE

## 2024-07-10 RX ORDER — HYDROMORPHONE HYDROCHLORIDE 1 MG/ML
0.25 INJECTION, SOLUTION INTRAMUSCULAR; INTRAVENOUS; SUBCUTANEOUS EVERY 5 MIN PRN
Status: DISCONTINUED | OUTPATIENT
Start: 2024-07-10 | End: 2024-07-10 | Stop reason: HOSPADM

## 2024-07-10 RX ORDER — SODIUM CHLORIDE, SODIUM LACTATE, POTASSIUM CHLORIDE, CALCIUM CHLORIDE 600; 310; 30; 20 MG/100ML; MG/100ML; MG/100ML; MG/100ML
INJECTION, SOLUTION INTRAVENOUS CONTINUOUS
Status: DISCONTINUED | OUTPATIENT
Start: 2024-07-10 | End: 2024-07-10 | Stop reason: HOSPADM

## 2024-07-10 RX ORDER — PROPOFOL 10 MG/ML
INJECTION, EMULSION INTRAVENOUS PRN
Status: DISCONTINUED | OUTPATIENT
Start: 2024-07-10 | End: 2024-07-10 | Stop reason: SDUPTHER

## 2024-07-10 RX ORDER — SODIUM CHLORIDE 9 MG/ML
INJECTION, SOLUTION INTRAVENOUS PRN
Status: DISCONTINUED | OUTPATIENT
Start: 2024-07-10 | End: 2024-07-10 | Stop reason: HOSPADM

## 2024-07-10 RX ORDER — LABETALOL HYDROCHLORIDE 5 MG/ML
10 INJECTION, SOLUTION INTRAVENOUS
Status: DISCONTINUED | OUTPATIENT
Start: 2024-07-10 | End: 2024-07-10 | Stop reason: HOSPADM

## 2024-07-10 RX ORDER — CEPHALEXIN 500 MG/1
500 CAPSULE ORAL 2 TIMES DAILY
Qty: 14 CAPSULE | Refills: 0 | Status: SHIPPED | OUTPATIENT
Start: 2024-07-10 | End: 2024-07-17

## 2024-07-10 RX ORDER — HYDRALAZINE HYDROCHLORIDE 20 MG/ML
10 INJECTION INTRAMUSCULAR; INTRAVENOUS
Status: DISCONTINUED | OUTPATIENT
Start: 2024-07-10 | End: 2024-07-10 | Stop reason: HOSPADM

## 2024-07-10 RX ORDER — OXYCODONE HYDROCHLORIDE 5 MG/1
5 TABLET ORAL PRN
Status: DISCONTINUED | OUTPATIENT
Start: 2024-07-10 | End: 2024-07-10 | Stop reason: HOSPADM

## 2024-07-10 RX ORDER — FENTANYL CITRATE 50 UG/ML
INJECTION, SOLUTION INTRAMUSCULAR; INTRAVENOUS PRN
Status: DISCONTINUED | OUTPATIENT
Start: 2024-07-10 | End: 2024-07-10 | Stop reason: SDUPTHER

## 2024-07-10 RX ORDER — LIDOCAINE 4 G/G
1 PATCH TOPICAL AS NEEDED
Status: DISCONTINUED | OUTPATIENT
Start: 2024-07-10 | End: 2024-07-10 | Stop reason: HOSPADM

## 2024-07-10 RX ORDER — OXYCODONE HYDROCHLORIDE 5 MG/1
10 TABLET ORAL PRN
Status: DISCONTINUED | OUTPATIENT
Start: 2024-07-10 | End: 2024-07-10 | Stop reason: HOSPADM

## 2024-07-10 RX ORDER — DIPHENHYDRAMINE HYDROCHLORIDE 50 MG/ML
12.5 INJECTION INTRAMUSCULAR; INTRAVENOUS
Status: DISCONTINUED | OUTPATIENT
Start: 2024-07-10 | End: 2024-07-10 | Stop reason: HOSPADM

## 2024-07-10 RX ORDER — NALOXONE HYDROCHLORIDE 0.4 MG/ML
INJECTION, SOLUTION INTRAMUSCULAR; INTRAVENOUS; SUBCUTANEOUS PRN
Status: DISCONTINUED | OUTPATIENT
Start: 2024-07-10 | End: 2024-07-10 | Stop reason: HOSPADM

## 2024-07-10 RX ORDER — ONDANSETRON 2 MG/ML
4 INJECTION INTRAMUSCULAR; INTRAVENOUS
Status: DISCONTINUED | OUTPATIENT
Start: 2024-07-10 | End: 2024-07-10 | Stop reason: HOSPADM

## 2024-07-10 RX ORDER — SODIUM CHLORIDE, SODIUM LACTATE, POTASSIUM CHLORIDE, CALCIUM CHLORIDE 600; 310; 30; 20 MG/100ML; MG/100ML; MG/100ML; MG/100ML
INJECTION, SOLUTION INTRAVENOUS ONCE
Status: DISCONTINUED | OUTPATIENT
Start: 2024-07-10 | End: 2024-07-10 | Stop reason: HOSPADM

## 2024-07-10 RX ORDER — SODIUM CHLORIDE 0.9 % (FLUSH) 0.9 %
5-40 SYRINGE (ML) INJECTION PRN
Status: DISCONTINUED | OUTPATIENT
Start: 2024-07-10 | End: 2024-07-10 | Stop reason: HOSPADM

## 2024-07-10 RX ORDER — IPRATROPIUM BROMIDE AND ALBUTEROL SULFATE 2.5; .5 MG/3ML; MG/3ML
1 SOLUTION RESPIRATORY (INHALATION)
Status: DISCONTINUED | OUTPATIENT
Start: 2024-07-10 | End: 2024-07-10 | Stop reason: HOSPADM

## 2024-07-10 RX ADMIN — PROPOFOL 25 MG: 10 INJECTION, EMULSION INTRAVENOUS at 13:46

## 2024-07-10 RX ADMIN — GENTAMICIN SULFATE 400 MG: 40 INJECTION, SOLUTION INTRAMUSCULAR; INTRAVENOUS at 13:55

## 2024-07-10 RX ADMIN — PROPOFOL 25 MG: 10 INJECTION, EMULSION INTRAVENOUS at 14:25

## 2024-07-10 RX ADMIN — PROPOFOL 25 MG: 10 INJECTION, EMULSION INTRAVENOUS at 14:13

## 2024-07-10 RX ADMIN — FENTANYL CITRATE 25 MCG: 50 INJECTION, SOLUTION INTRAMUSCULAR; INTRAVENOUS at 13:57

## 2024-07-10 RX ADMIN — PROPOFOL 25 MG: 10 INJECTION, EMULSION INTRAVENOUS at 13:59

## 2024-07-10 RX ADMIN — FENTANYL CITRATE 25 MCG: 50 INJECTION, SOLUTION INTRAMUSCULAR; INTRAVENOUS at 13:22

## 2024-07-10 RX ADMIN — PROPOFOL 25 MG: 10 INJECTION, EMULSION INTRAVENOUS at 14:04

## 2024-07-10 RX ADMIN — PROPOFOL 25 MG: 10 INJECTION, EMULSION INTRAVENOUS at 13:49

## 2024-07-10 RX ADMIN — PROPOFOL 25 MG: 10 INJECTION, EMULSION INTRAVENOUS at 13:53

## 2024-07-10 RX ADMIN — PROPOFOL 25 MG: 10 INJECTION, EMULSION INTRAVENOUS at 13:30

## 2024-07-10 RX ADMIN — PROPOFOL 25 MG: 10 INJECTION, EMULSION INTRAVENOUS at 13:36

## 2024-07-10 RX ADMIN — PROPOFOL 25 MG: 10 INJECTION, EMULSION INTRAVENOUS at 14:21

## 2024-07-10 RX ADMIN — PROPOFOL 25 MG: 10 INJECTION, EMULSION INTRAVENOUS at 13:28

## 2024-07-10 RX ADMIN — PROPOFOL 25 MG: 10 INJECTION, EMULSION INTRAVENOUS at 13:20

## 2024-07-10 RX ADMIN — PROPOFOL 25 MG: 10 INJECTION, EMULSION INTRAVENOUS at 14:43

## 2024-07-10 RX ADMIN — GLYCOPYRROLATE 0.2 MG: 0.2 INJECTION INTRAMUSCULAR; INTRAVENOUS at 13:11

## 2024-07-10 RX ADMIN — PROPOFOL 25 MG: 10 INJECTION, EMULSION INTRAVENOUS at 13:32

## 2024-07-10 RX ADMIN — FENTANYL CITRATE 25 MCG: 50 INJECTION, SOLUTION INTRAMUSCULAR; INTRAVENOUS at 13:29

## 2024-07-10 RX ADMIN — PROPOFOL 25 MG: 10 INJECTION, EMULSION INTRAVENOUS at 13:24

## 2024-07-10 RX ADMIN — PROPOFOL 25 MG: 10 INJECTION, EMULSION INTRAVENOUS at 14:33

## 2024-07-10 RX ADMIN — PROPOFOL 25 MG: 10 INJECTION, EMULSION INTRAVENOUS at 13:43

## 2024-07-10 RX ADMIN — PROPOFOL 25 MG: 10 INJECTION, EMULSION INTRAVENOUS at 14:38

## 2024-07-10 RX ADMIN — SODIUM CHLORIDE, POTASSIUM CHLORIDE, SODIUM LACTATE AND CALCIUM CHLORIDE: 600; 310; 30; 20 INJECTION, SOLUTION INTRAVENOUS at 12:54

## 2024-07-10 RX ADMIN — PROPOFOL 25 MG: 10 INJECTION, EMULSION INTRAVENOUS at 14:47

## 2024-07-10 RX ADMIN — PROPOFOL 25 MG: 10 INJECTION, EMULSION INTRAVENOUS at 14:30

## 2024-07-10 RX ADMIN — CEFAZOLIN SODIUM 2000 MG: 1 INJECTION, POWDER, FOR SOLUTION INTRAMUSCULAR; INTRAVENOUS at 13:11

## 2024-07-10 RX ADMIN — FENTANYL CITRATE 25 MCG: 50 INJECTION, SOLUTION INTRAMUSCULAR; INTRAVENOUS at 13:40

## 2024-07-10 ASSESSMENT — PAIN SCALES - GENERAL
PAINLEVEL_OUTOF10: 0

## 2024-07-10 ASSESSMENT — PAIN - FUNCTIONAL ASSESSMENT
PAIN_FUNCTIONAL_ASSESSMENT: NONE - DENIES PAIN
PAIN_FUNCTIONAL_ASSESSMENT: 0-10
PAIN_FUNCTIONAL_ASSESSMENT: 0-10

## 2024-07-10 NOTE — ANESTHESIA PRE PROCEDURE
Department of Anesthesiology  Preprocedure Note       Name:  Chely Malik   Age:  73 y.o.  :  1950                                          MRN:  [id number]         Date:  7/10/2024      Surgeon: Surgeon(s):  Juan Alberto Mayorga MD    Procedure: Procedure(s):  BILATERAL PERCUTANEOUS IMPLANTATION OF PERMANENT NEUROSTIMULATOR ELECTRODE ARRAY AND INSERTION OF NEUROSTIMULATOR    Medications prior to admission:   Prior to Admission medications    Medication Sig Start Date End Date Taking? Authorizing Provider   amLODIPine (NORVASC) 10 MG tablet Take 1 tablet by mouth daily 24   Lazaro Stovall MD   cloNIDine (CATAPRES) 0.2 MG tablet Take 1 tablet by mouth 2 times daily 24   Lazaro Stovall MD   digoxin (LANOXIN) 125 MCG tablet Take 1 tablet by mouth daily 24   Lazaro Stovall MD   Acetaminophen (TYLENOL PO) Take 500 mg by mouth daily as needed    ProviderMerrill MD   ibuprofen (ADVIL;MOTRIN) 400 MG tablet Take 1 tablet by mouth every 6 hours as needed for Pain    ProviderMerrill MD   magnesium 200 MG TABS tablet Take 1 tablet by mouth daily    ProviderMerrill MD   FLUoxetine (PROZAC) 10 MG capsule Take 1 capsule by mouth daily APPOINTMENT REQUIRED FOR ADDITIONAL REFILL. 24   George Shelton MD   vitamin D (CHOLECALCIFEROL) 25 MCG (1000 UT) TABS tablet Take 1 tablet by mouth daily    Automatic Reconciliation, Ar   ferrous sulfate (IRON 325) 325 (65 Fe) MG tablet Take 1 tablet by mouth every other day 23   Automatic Reconciliation, Ar       Current medications:    Current Facility-Administered Medications   Medication Dose Route Frequency Provider Last Rate Last Admin   • lactated ringers IV soln infusion   IntraVENous Continuous Juan Alberto Mayorga MD       • ceFAZolin (ANCEF) 2,000 mg in sterile water 20 mL IV syringe  2,000 mg IntraVENous Once Juan Alberto Mayorga MD       • gentamicin (GARAMYCIN) 400 mg in sodium chloride 0.9 % 250 mL IVPB  400 mg IntraVENous Once

## 2024-07-10 NOTE — PROGRESS NOTES
X3 INCISIONS TO BACK DRY AND INTACT , IV DC'D SITE INTACT NO SWELLING NOTED ,PT ASSISTED TO BR , UNABLE TO URINATE , DR HENSON CALLED AND STATES PT CAN BE DISCHARGED PRIOR TO URINATING , DISCHARGE INSTRUCTIONS GIVEN TO PT AND PT'S FRIEND ELIDIA , BOTH VERBALIZED UNDERSTANDING , NO DISTRESS NOTED

## 2024-07-10 NOTE — PROGRESS NOTES
Patient transferred to [facility] via stretcher in stable condition.  Bedside report given, SBAR reviewed, sites viewed. Patients friend and belongings at the bedside.

## 2024-07-10 NOTE — H&P
UROLOGY HISTORY AND PHYSICAL  Juan Alberto Mayorga MD  [phone] Office    Patient: Chely Malik MRN: [id number]  SSN: [id number]    YOB: 1950  Age: 73 y.o.  Sex: female          Date of Encounter:  July 10, 2024  Chief Complaint:  Urge Incontinence             History of Present Illness:  Patient is a 73 y.o. female here for surgery.    She underwent a successful PNE Trial and wishes to proceed with implantation of [employer] Sacral neuromodulation. No new changes since last visit.        Past Medical History:  Allergies   Allergen Reactions    Aspirin     Statins Myalgia and Other (See Comments)    Sulfa Antibiotics Hives     Other reaction(s): unknown    Tetanus Toxoids Other (See Comments)     Local reaction--arm red and swollen      Prior to Admission medications    Medication Sig Start Date End Date Taking? Authorizing Provider   amLODIPine (NORVASC) 10 MG tablet Take 1 tablet by mouth daily 6/17/24   Lazaro Stovall MD   cloNIDine (CATAPRES) 0.2 MG tablet Take 1 tablet by mouth 2 times daily 6/17/24   Lazaro Stovall MD   digoxin (LANOXIN) 125 MCG tablet Take 1 tablet by mouth daily 6/17/24   Lazaro Stovall MD   Acetaminophen (TYLENOL PO) Take 500 mg by mouth daily as needed    ProviderMerrill MD   ibuprofen (ADVIL;MOTRIN) 400 MG tablet Take 1 tablet by mouth every 6 hours as needed for Pain    ProviderMerrill MD   magnesium 200 MG TABS tablet Take 1 tablet by mouth daily    ProviderMerrill MD   FLUoxetine (PROZAC) 10 MG capsule Take 1 capsule by mouth daily APPOINTMENT REQUIRED FOR ADDITIONAL REFILL. 4/22/24   George Shelton MD   vitamin D (CHOLECALCIFEROL) 25 MCG (1000 UT) TABS tablet Take 1 tablet by mouth daily    Automatic Reconciliation, Ar   ferrous sulfate (IRON 325) 325 (65 Fe) MG tablet Take 1 tablet by mouth every other day 2/26/23   Automatic Reconciliation, Ar      PMHx:  has a past medical history of Anemia, Arthritis, Depression, Diabetes (HCC),

## 2024-07-10 NOTE — ANESTHESIA POSTPROCEDURE EVALUATION
Department of Anesthesiology  Postprocedure Note    Patient: Chely Malik  MRN: [id number]  YOB: 1950  Date of evaluation: 7/10/2024    Procedure Summary       Date: 07/10/24 Room / Location: [facility] MAIN OR 02 / [facility] MAIN OR    Anesthesia Start: 1311 Anesthesia Stop: 1505    Procedure: BILATERAL PERCUTANEOUS IMPLANTATION OF PERMANENT NEUROSTIMULATOR ELECTRODE ARRAY AND INSERTION OF NEUROSTIMULATOR (Bilateral: Back) Diagnosis:       Mixed stress and urge urinary incontinence      (Mixed stress and urge urinary incontinence [N39.46])    Surgeons: Juan Alberto Mayorga MD Responsible Provider: Rashawn Palacios Jr., MD    Anesthesia Type: MAC, TIVA ASA Status: 3            Anesthesia Type: No value filed.    Linda Phase I: Linda Score: 9    Linda Phase II:      Anesthesia Post Evaluation    Patient location during evaluation: bedside  Patient participation: complete - patient participated  Level of consciousness: awake  Pain score: 0  Airway patency: patent  Nausea & Vomiting: no vomiting and no nausea  Cardiovascular status: blood pressure returned to baseline  Respiratory status: acceptable  Hydration status: stable  Pain management: adequate    No notable events documented.

## 2024-07-10 NOTE — TELEPHONE ENCOUNTER
Heart muscle is strong  Valves fine except moderate TR , we will keep watch  12/20/2017 8:50 AM   Edwina Clayton MD      [facility]         DANILO SCHED  7/11/2018  10:40 AM   MD HOMERO Hess SCHED   No changes in meds needed Detail Level: Detailed Add 68151 Cpt? (Important Note: In 2017 The Use Of 84902 Is Being Tracked By Cms To Determine Future Global Period Reimbursement For Global Periods): yes

## 2024-07-10 NOTE — OP NOTE
The level of the S3 and the medial border of the sacral foramen were identified bilaterally using fluoroscopy and the PA view.  After administration of local anesthesia a foramen needle was placed in the superior medial aspect of the S3 foramen such that the needle was parallel to the medial border of the foramen. This needle was on the patient's right side.  A lateral fluoroscopic image was then obtained to ensure the needle entry point was approximately 1 cm above and parallel to the fusion plate at S3.  The needle was advanced such that the tip of the foramen needle was just at the anterior aspect of the sacrum. The J hip was then placed on the on insulated portion of the foramen needle and stimulation applied to obtain the opening threshold amplitude.  There is noted to be positive silvano response as well as a great toe flexion.  Once the ideal location was confirmed a small incision was made at the foramen needle to accommodate the lead wire and tunneling tool.  The directional guide was placed through the foramen needle and the needle was removed.  The introducer was placed over the directional guide and advanced under live fluoroscopy such that the radiopaque marker was placed prison through the sacral plate.  The dilator was removed along with a directional guide.  Using live fluoroscopy the tined lead with the curved stylette was placed through the introducer until electrodes 3 straddled the anterior surface of the sacrum and ensuring the lead had a gentle downward trajectory.  The stimulation clip was then placed on the distal lead and the each electrode was tested observing for a motor response.  After satisfactory lead positioning was confirmed the introducer was retracted over the lead under continuous fluoroscopy.  Stimulation thresholds were established using the least amount of stimulation required to obtain a motor response.     After administration of local anesthetic, a 2.5 cm incision was made

## 2024-07-15 ENCOUNTER — OFFICE VISIT (OUTPATIENT)
Facility: CLINIC | Age: 74
End: 2024-07-15

## 2024-07-15 VITALS
SYSTOLIC BLOOD PRESSURE: 114 MMHG | HEART RATE: 84 BPM | OXYGEN SATURATION: 98 % | BODY MASS INDEX: 25.43 KG/M2 | DIASTOLIC BLOOD PRESSURE: 55 MMHG | TEMPERATURE: 98.3 F | HEIGHT: 67 IN | WEIGHT: 162 LBS | RESPIRATION RATE: 18 BRPM

## 2024-07-15 DIAGNOSIS — D50.8 OTHER IRON DEFICIENCY ANEMIA: Primary | ICD-10-CM

## 2024-07-15 LAB — HEMOGLOBIN, POC: 10.1 G/DL

## 2024-07-15 RX ORDER — FERROUS SULFATE 325(65) MG
325 TABLET ORAL EVERY OTHER DAY
Qty: 90 TABLET | Refills: 3 | Status: SHIPPED | OUTPATIENT
Start: 2024-07-15 | End: 2026-07-04

## 2024-07-15 NOTE — PROGRESS NOTES
Chief Complaint   Patient presents with    Follow-up         \"Have you been to the ER, urgent care clinic since your last visit?  Hospitalized since your last visit?\"    NO    “Have you seen or consulted any other health care providers outside of [facility] since your last visit?”    NO    Have you had a mammogram?”   NO- [facility] will no longer have mammograms.          “Have you had a colorectal cancer screening such as a colonoscopy/FIT/Cologuard?    NO- [facility] will not have no further colonoscopy.         Click Here for Release of Records Request     Health Maintenance Due   Topic Date Due    Pneumococcal 65+ years Vaccine (1 of 2 - PCV) Never done    Breast cancer screen  Never done    DEXA (modify frequency per FRAX score)  Never done    Respiratory Syncytial Virus (RSV) Pregnant or age 60 yrs+ (1 - 1-dose 60+ series) Never done    Shingles vaccine (2 of 3) 02/06/2013    Diabetic retinal exam  02/11/2020    Diabetic foot exam  02/12/2020    Colorectal Cancer Screen  02/15/2022    COVID-19 Vaccine (4 - 2023-24 season) 09/01/2023    Diabetic Alb to Cr ratio (uACR) test  02/17/2024    Lipids  02/17/2024    Annual Wellness Visit (Medicare)  06/04/2024       
I discussed the patient's history and physical exam with the resident. I reviewed the assessment and plan and agree with the resident's documentation.     
UT) TABS tablet Take 1 tablet by mouth daily     No current facility-administered medications for this visit.        Allergies:  Allergies   Allergen Reactions    Aspirin     Statins Myalgia and Other (See Comments)    Sulfa Antibiotics Hives     Other reaction(s): unknown    Tetanus Toxoids Other (See Comments)     Local reaction--arm red and swollen        Health Maintenance:  Health Maintenance Due   Topic Date Due    Pneumococcal 65+ years Vaccine (1 of 2 - PCV) Never done    Breast cancer screen  Never done    DEXA (modify frequency per FRAX score)  Never done    Respiratory Syncytial Virus (RSV) Pregnant or age 60 yrs+ (1 - 1-dose 60+ series) Never done    Shingles vaccine (2 of 3) 02/06/2013    Diabetic retinal exam  02/11/2020    Diabetic foot exam  02/12/2020    Colorectal Cancer Screen  02/15/2022    COVID-19 Vaccine (4 - 2023-24 season) 09/01/2023    Diabetic Alb to Cr ratio (uACR) test  02/17/2024    Lipids  02/17/2024    Annual Wellness Visit (Medicare)  06/04/2024          Objective:   Vitals reviewed.  BP (!) 114/55 (Site: Left Upper Arm, Position: Sitting, Cuff Size: Medium Adult)   Pulse 84   Temp 98.3 °F (36.8 °C) (Oral)   Resp 18   Ht 1.702 m (5' 7\")   Wt 73.5 kg (162 lb)   SpO2 98%   BMI 25.37 kg/m²      Physical Exam:  General: No acute distress  Head: Normocephalic, atraumatic  Eyes: Conjunctiva pink  CV: irregularly irregular rate and rhythm   Resp: Lungs: clear to auscultation bilaterally  GI: + bowel sounds, non-tender to palpation, non-distended.  Skin: Warm, dry    Assessment/Plan:     Chely Malik is a 73 y.o. female that presents for:      Diagnosis Orders   1. Other iron deficiency anemia  ferrous sulfate (IRON 325) 325 (65 Fe) MG tablet    AMB POC HEMOGLOBIN (HGB)    COLLECTION CAPILLARY BLOOD SPECIMEN      Patient requesting POC hbg test today, POC Hbg level 10.1 (7/15/24). Declining IV Iron transfusion therapy, hematology/oncology referral or colon cancer screening at this

## 2024-08-26 ENCOUNTER — OFFICE VISIT (OUTPATIENT)
Age: 74
End: 2024-08-26

## 2024-08-26 VITALS
HEART RATE: 77 BPM | SYSTOLIC BLOOD PRESSURE: 186 MMHG | DIASTOLIC BLOOD PRESSURE: 79 MMHG | WEIGHT: 162 LBS | BODY MASS INDEX: 25.43 KG/M2 | HEIGHT: 67 IN

## 2024-08-26 DIAGNOSIS — N39.46 MIXED STRESS AND URGE URINARY INCONTINENCE: Primary | ICD-10-CM

## 2024-08-26 PROCEDURE — 99024 POSTOP FOLLOW-UP VISIT: CPT | Performed by: STUDENT IN AN ORGANIZED HEALTH CARE EDUCATION/TRAINING PROGRAM

## 2024-08-26 NOTE — ASSESSMENT & PLAN NOTE
Chronic issue at treatment goal and stable.  Patient is satisfied with her treatment and will continue to utilize the [employer] sacral neuromodulation device.  She has a contact information for the [employer] rep and will adjust the device as needed.  She will call me in the future if she has any issues with the device for any additional questions or concerns.

## 2024-08-26 NOTE — PROGRESS NOTES
Chief Complaint   Patient presents with    Follow Up After Procedure     1. Have you been to the ER, urgent care clinic since your last visit?  Hospitalized since your last visit?No    2. Have you seen or consulted any other health care providers outside of the [facility] since your last visit?  Include any pap smears or colon screening. No  BP (!) 186/79 (Site: Left Upper Arm, Position: Sitting, Cuff Size: Medium Adult)   Pulse 77   Ht 1.702 m (5' 7\")   Wt 73.5 kg (162 lb)   BMI 25.37 kg/m²     
reduction in number of voids per day.   Assessment & Plan:  Chronic issue at treatment goal and stable.  Patient is satisfied with her treatment and will continue to utilize the [employer] sacral neuromodulation device.  She has a contact information for the [employer] rep and will adjust the device as needed.  She will call me in the future if she has any issues with the device for any additional questions or concerns.         Juan Alberto Mayorga MD      Please note that portions of this note were completed with Dragon dictation, the computer voice recognition software.  Quite often unanticipated grammatical, syntax, homophones, and other interpretive errors are inadvertently transcribed by the computer software.  Please disregard these errors and any other errors that may have escaped final proofreading.  Thank you.

## 2024-10-14 ENCOUNTER — OFFICE VISIT (OUTPATIENT)
Facility: CLINIC | Age: 74
End: 2024-10-14
Payer: MEDICARE

## 2024-10-14 VITALS
DIASTOLIC BLOOD PRESSURE: 70 MMHG | RESPIRATION RATE: 18 BRPM | BODY MASS INDEX: 25.74 KG/M2 | TEMPERATURE: 98.9 F | HEART RATE: 74 BPM | SYSTOLIC BLOOD PRESSURE: 123 MMHG | WEIGHT: 164 LBS | OXYGEN SATURATION: 99 % | HEIGHT: 67 IN

## 2024-10-14 DIAGNOSIS — E11.21 TYPE 2 DIABETES WITH NEPHROPATHY (HCC): ICD-10-CM

## 2024-10-14 DIAGNOSIS — D50.9 IRON DEFICIENCY ANEMIA, UNSPECIFIED IRON DEFICIENCY ANEMIA TYPE: Primary | ICD-10-CM

## 2024-10-14 DIAGNOSIS — Z00.00 HEALTHCARE MAINTENANCE: ICD-10-CM

## 2024-10-14 DIAGNOSIS — I48.0 PAF (PAROXYSMAL ATRIAL FIBRILLATION) (HCC): ICD-10-CM

## 2024-10-14 DIAGNOSIS — I10 ESSENTIAL (PRIMARY) HYPERTENSION: ICD-10-CM

## 2024-10-14 LAB — HEMOGLOBIN, POC: 10.9 G/DL

## 2024-10-14 PROCEDURE — 3078F DIAST BP <80 MM HG: CPT

## 2024-10-14 PROCEDURE — G8400 PT W/DXA NO RESULTS DOC: HCPCS

## 2024-10-14 PROCEDURE — G8427 DOCREV CUR MEDS BY ELIG CLIN: HCPCS

## 2024-10-14 PROCEDURE — 3044F HG A1C LEVEL LT 7.0%: CPT

## 2024-10-14 PROCEDURE — 1123F ACP DISCUSS/DSCN MKR DOCD: CPT

## 2024-10-14 PROCEDURE — G8484 FLU IMMUNIZE NO ADMIN: HCPCS

## 2024-10-14 PROCEDURE — G8419 CALC BMI OUT NRM PARAM NOF/U: HCPCS

## 2024-10-14 PROCEDURE — 3017F COLORECTAL CA SCREEN DOC REV: CPT

## 2024-10-14 PROCEDURE — 1090F PRES/ABSN URINE INCON ASSESS: CPT

## 2024-10-14 PROCEDURE — 3074F SYST BP LT 130 MM HG: CPT

## 2024-10-14 PROCEDURE — 85018 HEMOGLOBIN: CPT

## 2024-10-14 PROCEDURE — 99214 OFFICE O/P EST MOD 30 MIN: CPT

## 2024-10-14 PROCEDURE — 2022F DILAT RTA XM EVC RTNOPTHY: CPT

## 2024-10-14 PROCEDURE — 1036F TOBACCO NON-USER: CPT

## 2024-10-14 RX ORDER — CLONIDINE HYDROCHLORIDE 0.2 MG/1
0.2 TABLET ORAL 2 TIMES DAILY
Qty: 180 TABLET | Refills: 0 | Status: SHIPPED | OUTPATIENT
Start: 2024-10-14 | End: 2024-10-15 | Stop reason: SDUPTHER

## 2024-10-14 RX ORDER — DIGOXIN 125 MCG
125 TABLET ORAL DAILY
Qty: 90 TABLET | Refills: 0 | Status: SHIPPED | OUTPATIENT
Start: 2024-10-14 | End: 2024-10-15 | Stop reason: SDUPTHER

## 2024-10-14 NOTE — PROGRESS NOTES
[facility]  [address] [place] [id number]  Tel: [phone]  Fax: [phone]      Chief Complaint:     Chief Complaint   Patient presents with    Follow-up Chronic Condition       Subjective:   HPI:  Chely Malik is a 73 y.o. female that presents to the clinic for:    #Anemia Follow Up   - Presenting today for a follow up of her iron def anemia  - \"I would like my Hbg checked today like last time\"   - Has iron supplement prescribed, but has not taken for 2 weeks  - Reports doing well, no complaints or concerns  - Denies feeling fatigued, lightheadedness/dizziness, palpitations, abdominal pain, blood in the urine or stool  - States she does not want work up with colonoscopy    Chronic Problems:   Hypertension:   - Norvasc and Clonidine     Paroxsymal A. Fib:   - Digoxin  - Long term medication, filled by PCP  - Does not follow with cardiology any more    T2DM:   - Diet controlled  - Does not check blood glucoses at home   - Last A1c 5.4 (6/2024)    ROS:   Negative other than mentioned in HPI      Past medical history, social history, medications, and allergies personally reviewed.  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 after having 4 covid shots    PAF (paroxysmal atrial fibrillation) (HCC)     Sleep apnea     in the past, never had a cpap    Status post gastric banding      Social History     Socioeconomic History    Marital status: Single     Spouse name: Not on file    Number of children: Not on file    Years of education: Not on file    Highest education level: Not on file   Occupational History    Not on file   Tobacco Use    Smoking status: Never    Smokeless tobacco: Never   Vaping Use    Vaping status: Never Used   Substance and

## 2024-10-14 NOTE — PROGRESS NOTES
\"Have you been to the ER, urgent care clinic since your last visit?  Hospitalized since your last visit?\"    no    “Have you seen or consulted any other health care providers outside our system since your last visit?”    no    Have you had a mammogram?”   Pt declines    No breast cancer screening on file       “Have you had a colorectal cancer screening such as a colonoscopy/FIT/Cologuard?    Pt declines    Date of last Colonoscopy: 2/15/2012  No cologuard on file  No FIT/FOBT on file   No flexible sigmoidoscopy on file     “Have you had a diabetic eye exam?”    Pt declines    Date of last diabetic eye exam: 2/11/2019

## 2024-10-14 NOTE — PROGRESS NOTES
I saw and evaluated the patient, performing the key elements of the service.  I discussed the findings, assessment and plan with the resident and agree with the resident's findings and plan as documented in the resident's note.    Patient refusing labs today. Encouraged close follow up.

## 2024-10-15 ENCOUNTER — TELEPHONE (OUTPATIENT)
Facility: CLINIC | Age: 74
End: 2024-10-15

## 2024-10-15 DIAGNOSIS — I10 ESSENTIAL (PRIMARY) HYPERTENSION: ICD-10-CM

## 2024-10-15 RX ORDER — DIGOXIN 125 MCG
125 TABLET ORAL DAILY
Qty: 90 TABLET | Refills: 0 | Status: SHIPPED | OUTPATIENT
Start: 2024-10-15

## 2024-10-15 RX ORDER — CLONIDINE HYDROCHLORIDE 0.2 MG/1
0.2 TABLET ORAL 2 TIMES DAILY
Qty: 180 TABLET | Refills: 0 | Status: SHIPPED | OUTPATIENT
Start: 2024-10-15

## 2024-10-15 NOTE — TELEPHONE ENCOUNTER
Please send prescription to [facility]    digoxin (LANOXIN) 125 MCG tablet [[id number]]    Order Details  Dose: 125 mcg Route: Oral Frequency: DAILY   Dispense Quantity: 90 tablet Refills: 0          Sig: Take 1 tablet by mouth daily   .      cloNIDine (CATAPRES) 0.2 MG tablet [[id number]]    Order Details  Dose: 0.2 mg Route: Oral Frequency: 2 TIMES DAILY   Dispense Quantity: 180 tablet Refills: 0          Sig: Take 1 tablet by mouth 2 times daily

## 2025-01-07 DIAGNOSIS — I10 ESSENTIAL (PRIMARY) HYPERTENSION: ICD-10-CM

## 2025-01-07 RX ORDER — CLONIDINE HYDROCHLORIDE 0.2 MG/1
0.2 TABLET ORAL 2 TIMES DAILY
Qty: 180 TABLET | Refills: 0 | Status: SHIPPED | OUTPATIENT
Start: 2025-01-07

## 2025-01-07 RX ORDER — DIGOXIN 125 MCG
125 TABLET ORAL DAILY
Qty: 90 TABLET | Refills: 0 | Status: SHIPPED | OUTPATIENT
Start: 2025-01-07

## 2025-01-07 NOTE — TELEPHONE ENCOUNTER
Attempted to call patient, no answer. Unable to leave message.    Due for OV. Please schedule upon return call.

## 2025-01-07 NOTE — TELEPHONE ENCOUNTER
digoxin (LANOXIN) 125 MCG tablet   cloNIDine (CATAPRES) 0.2 MG tablet   Please send to [facility].

## 2025-03-27 ENCOUNTER — TELEPHONE (OUTPATIENT)
Facility: CLINIC | Age: 75
End: 2025-03-27

## 2025-03-27 DIAGNOSIS — I10 ESSENTIAL (PRIMARY) HYPERTENSION: ICD-10-CM

## 2025-03-27 RX ORDER — DIGOXIN 125 MCG
125 TABLET ORAL DAILY
Qty: 90 TABLET | Refills: 0 | Status: SHIPPED | OUTPATIENT
Start: 2025-03-27

## 2025-03-27 RX ORDER — CLONIDINE HYDROCHLORIDE 0.2 MG/1
0.2 TABLET ORAL 2 TIMES DAILY
Qty: 180 TABLET | Refills: 0 | Status: SHIPPED | OUTPATIENT
Start: 2025-03-27

## 2025-03-27 NOTE — TELEPHONE ENCOUNTER
Pt will like med refilled. Please send to [facility]. Please advise.   digoxin (LANOXIN) 125 MCG table   cloNIDine (CATAPRES) 0.2 MG tablet

## 2025-04-11 ENCOUNTER — TELEPHONE (OUTPATIENT)
Facility: CLINIC | Age: 75
End: 2025-04-11

## 2025-04-11 NOTE — TELEPHONE ENCOUNTER
Called patient. No answer. Unable to leave VM. Patient needs to schedule appointment for routine follow up.

## 2025-06-26 ENCOUNTER — TELEPHONE (OUTPATIENT)
Facility: CLINIC | Age: 75
End: 2025-06-26

## 2025-06-26 DIAGNOSIS — I10 ESSENTIAL (PRIMARY) HYPERTENSION: ICD-10-CM

## 2025-06-26 NOTE — TELEPHONE ENCOUNTER
cloNIDine (CATAPRES) 0.2 MG tablet   digoxin (LANOXIN) 125 MCG tablet    FLUoxetine (PROZAC) 10 MG capsule    Pt states she will like meds refilled. States she has no transportation to come to the doctor. Pt will like meds to go to [facility]. Please advise.

## 2025-06-27 RX ORDER — CLONIDINE HYDROCHLORIDE 0.2 MG/1
0.2 TABLET ORAL 2 TIMES DAILY
Qty: 180 TABLET | Refills: 0 | Status: SHIPPED | OUTPATIENT
Start: 2025-06-27

## 2025-06-27 RX ORDER — FLUOXETINE 10 MG/1
10 CAPSULE ORAL DAILY
Qty: 90 CAPSULE | Refills: 0 | Status: SHIPPED | OUTPATIENT
Start: 2025-06-27

## 2025-06-27 RX ORDER — DIGOXIN 125 MCG
125 TABLET ORAL DAILY
Qty: 90 TABLET | Refills: 0 | Status: SHIPPED | OUTPATIENT
Start: 2025-06-27

## 2025-06-27 NOTE — TELEPHONE ENCOUNTER
Patient called x2. No answer. Unable to leave message.    She can arrange transportation through her insurance by calling the number on the back of the card.    She should also call [facility] at [phone]. They offer transportation and other services.    LOV 10/14/24 with directive for follow up in one month. Having an appointment and labs drawn are very important due to medications ordered.

## 2025-08-07 ENCOUNTER — OFFICE VISIT (OUTPATIENT)
Facility: CLINIC | Age: 75
End: 2025-08-07

## 2025-08-07 VITALS
DIASTOLIC BLOOD PRESSURE: 76 MMHG | OXYGEN SATURATION: 98 % | HEART RATE: 63 BPM | HEIGHT: 67 IN | BODY MASS INDEX: 24.52 KG/M2 | RESPIRATION RATE: 18 BRPM | SYSTOLIC BLOOD PRESSURE: 170 MMHG | TEMPERATURE: 98.1 F | WEIGHT: 156.2 LBS

## 2025-08-07 DIAGNOSIS — I48.0 PAF (PAROXYSMAL ATRIAL FIBRILLATION) (HCC): Primary | ICD-10-CM

## 2025-08-07 DIAGNOSIS — I10 ESSENTIAL (PRIMARY) HYPERTENSION: ICD-10-CM

## 2025-08-07 DIAGNOSIS — D50.8 IRON DEFICIENCY ANEMIA SECONDARY TO INADEQUATE DIETARY IRON INTAKE: ICD-10-CM

## 2025-08-07 DIAGNOSIS — E11.21 TYPE 2 DIABETES WITH NEPHROPATHY (HCC): ICD-10-CM

## 2025-08-07 RX ORDER — DIGOXIN 125 MCG
125 TABLET ORAL DAILY
Qty: 90 TABLET | Refills: 0 | Status: SHIPPED | OUTPATIENT
Start: 2025-08-07

## 2025-08-07 RX ORDER — CLONIDINE HYDROCHLORIDE 0.2 MG/1
0.2 TABLET ORAL 2 TIMES DAILY
Qty: 180 TABLET | Refills: 3 | Status: SHIPPED | OUTPATIENT
Start: 2025-08-07

## 2025-08-07 SDOH — ECONOMIC STABILITY: FOOD INSECURITY: WITHIN THE PAST 12 MONTHS, THE FOOD YOU BOUGHT JUST DIDN'T LAST AND YOU DIDN'T HAVE MONEY TO GET MORE.: NEVER TRUE

## 2025-08-07 SDOH — ECONOMIC STABILITY: FOOD INSECURITY: WITHIN THE PAST 12 MONTHS, YOU WORRIED THAT YOUR FOOD WOULD RUN OUT BEFORE YOU GOT MONEY TO BUY MORE.: NEVER TRUE

## 2025-08-07 ASSESSMENT — PATIENT HEALTH QUESTIONNAIRE - PHQ9
10. IF YOU CHECKED OFF ANY PROBLEMS, HOW DIFFICULT HAVE THESE PROBLEMS MADE IT FOR YOU TO DO YOUR WORK, TAKE CARE OF THINGS AT HOME, OR GET ALONG WITH OTHER PEOPLE: NOT DIFFICULT AT ALL
7. TROUBLE CONCENTRATING ON THINGS, SUCH AS READING THE NEWSPAPER OR WATCHING TELEVISION: NOT AT ALL
SUM OF ALL RESPONSES TO PHQ QUESTIONS 1-9: 0
SUM OF ALL RESPONSES TO PHQ QUESTIONS 1-9: 0
3. TROUBLE FALLING OR STAYING ASLEEP: NOT AT ALL
SUM OF ALL RESPONSES TO PHQ QUESTIONS 1-9: 0
9. THOUGHTS THAT YOU WOULD BE BETTER OFF DEAD, OR OF HURTING YOURSELF: NOT AT ALL
1. LITTLE INTEREST OR PLEASURE IN DOING THINGS: NOT AT ALL
SUM OF ALL RESPONSES TO PHQ QUESTIONS 1-9: 0
8. MOVING OR SPEAKING SO SLOWLY THAT OTHER PEOPLE COULD HAVE NOTICED. OR THE OPPOSITE, BEING SO FIGETY OR RESTLESS THAT YOU HAVE BEEN MOVING AROUND A LOT MORE THAN USUAL: NOT AT ALL
6. FEELING BAD ABOUT YOURSELF - OR THAT YOU ARE A FAILURE OR HAVE LET YOURSELF OR YOUR FAMILY DOWN: NOT AT ALL
2. FEELING DOWN, DEPRESSED OR HOPELESS: NOT AT ALL
5. POOR APPETITE OR OVEREATING: NOT AT ALL
4. FEELING TIRED OR HAVING LITTLE ENERGY: NOT AT ALL

## 2025-08-08 ENCOUNTER — RESULTS FOLLOW-UP (OUTPATIENT)
Facility: CLINIC | Age: 75
End: 2025-08-08

## 2025-08-08 DIAGNOSIS — D50.8 IRON DEFICIENCY ANEMIA SECONDARY TO INADEQUATE DIETARY IRON INTAKE: Primary | ICD-10-CM

## 2025-08-08 DIAGNOSIS — R79.89 ELEVATED SERUM CREATININE: ICD-10-CM

## 2025-08-08 LAB
ALBUMIN SERPL-MCNC: 3.7 G/DL (ref 3.5–5.2)
ALBUMIN/GLOB SERPL: 1.1 (ref 1.1–2.2)
ALP SERPL-CCNC: 92 U/L (ref 35–104)
ALT SERPL-CCNC: 12 U/L (ref 10–35)
ANION GAP SERPL CALC-SCNC: 10 MMOL/L (ref 2–14)
AST SERPL-CCNC: 19 U/L (ref 10–35)
BASOPHILS # BLD: 0.06 K/UL (ref 0–0.1)
BASOPHILS NFR BLD: 1.1 % (ref 0–1)
BILIRUB SERPL-MCNC: 0.4 MG/DL (ref 0–1.2)
BUN SERPL-MCNC: 31 MG/DL (ref 8–23)
BUN/CREAT SERPL: 26 (ref 12–20)
CALCIUM SERPL-MCNC: 9.6 MG/DL (ref 8.8–10.2)
CHLORIDE SERPL-SCNC: 106 MMOL/L (ref 98–107)
CHOLEST SERPL-MCNC: 171 MG/DL (ref 0–200)
CO2 SERPL-SCNC: 27 MMOL/L (ref 20–29)
CREAT SERPL-MCNC: 1.16 MG/DL (ref 0.6–1)
DIFFERENTIAL METHOD BLD: ABNORMAL
DIGOXIN SERPL-MCNC: 0.4 NG/ML (ref 0.6–1.2)
EOSINOPHIL # BLD: 0.27 K/UL (ref 0–0.4)
EOSINOPHIL NFR BLD: 5 % (ref 0–7)
ERYTHROCYTE [DISTWIDTH] IN BLOOD BY AUTOMATED COUNT: 17.5 % (ref 11.5–14.5)
EST. AVERAGE GLUCOSE BLD GHB EST-MCNC: 111 MG/DL
GLOBULIN SER CALC-MCNC: 3.2 G/DL (ref 2–4)
GLUCOSE SERPL-MCNC: 93 MG/DL (ref 65–100)
HBA1C MFR BLD: 5.5 % (ref 4–5.6)
HCT VFR BLD AUTO: 33.1 % (ref 35–47)
HDLC SERPL-MCNC: 72 MG/DL (ref 40–60)
HDLC SERPL: 2.4
HGB BLD-MCNC: 9.5 G/DL (ref 11.5–16)
IMM GRANULOCYTES # BLD AUTO: 0.01 K/UL (ref 0–0.04)
IMM GRANULOCYTES NFR BLD AUTO: 0.2 % (ref 0–0.5)
LDLC SERPL CALC-MCNC: 91 MG/DL
LYMPHOCYTES # BLD: 1.01 K/UL (ref 0.8–3.5)
LYMPHOCYTES NFR BLD: 19 % (ref 12–49)
MCH RBC QN AUTO: 23.5 PG (ref 26–34)
MCHC RBC AUTO-ENTMCNC: 28.7 G/DL (ref 30–36.5)
MCV RBC AUTO: 81.7 FL (ref 80–99)
MONOCYTES # BLD: 0.51 K/UL (ref 0–1)
MONOCYTES NFR BLD: 9.7 % (ref 5–13)
NEUTS SEG # BLD: 3.45 K/UL (ref 1.8–8)
NEUTS SEG NFR BLD: 65 % (ref 32–75)
NRBC # BLD: 0 K/UL (ref 0–0.01)
NRBC BLD-RTO: 0 PER 100 WBC
PLATELET # BLD AUTO: 262 K/UL (ref 150–400)
PMV BLD AUTO: 9.2 FL (ref 8.9–12.9)
POTASSIUM SERPL-SCNC: 3.9 MMOL/L (ref 3.5–5.1)
PROT SERPL-MCNC: 6.9 G/DL (ref 6.4–8.3)
RBC # BLD AUTO: 4.05 M/UL (ref 3.8–5.2)
RBC MORPH BLD: ABNORMAL
SODIUM SERPL-SCNC: 143 MMOL/L (ref 136–145)
TRIGL SERPL-MCNC: 40 MG/DL (ref 0–150)
VLDLC SERPL CALC-MCNC: 8 MG/DL
WBC # BLD AUTO: 5.3 K/UL (ref 3.6–11)

## (undated) DEVICE — DRAPE EQUIP C ARM 74X42 IN MOB XR W/ TIE RUBBER BND LF

## (undated) DEVICE — SUTURE MONOCRYL + SZ 3-0 L27IN ABSRB UD PS1 L24MM 3/8 CIR REV MCP936H

## (undated) DEVICE — Device: Brand: JELCO

## (undated) DEVICE — SUTURE VICRYL + SZ 3-0 L27IN ABSRB UD L26MM SH 1/2 CIR VCP416H

## (undated) DEVICE — REMOTE CONTROL: Brand: AXONICS

## (undated) DEVICE — SOLUTION IRRIG 1000ML 0.9% SOD CHL USP POUR PLAS BTL

## (undated) DEVICE — DRAPE SURG TOWEL SM 18X12 IN INVISISHIELD MP W/ ADH PLAS NS

## (undated) DEVICE — MINOR GENERAL: Brand: MEDLINE INDUSTRIES, INC.

## (undated) DEVICE — LAMINECTOMY ARM CRADLE FOAM POSITIONER: Brand: CARDINAL HEALTH

## (undated) DEVICE — TOWEL,OR,DSP,ST,BLUE,STD,4/PK,20PK/CS: Brand: MEDLINE

## (undated) DEVICE — GLOVE SURG SZ 75 L12IN FNGR THK79MIL GRN LTX FREE

## (undated) DEVICE — ADHESIVE SKIN CLOSURE WND 8.661X1.5 IN 22 CM LIQUIBAND SECUR

## (undated) DEVICE — PNE LEAD: Brand: AXONICS

## (undated) DEVICE — SYRINGE MED 10ML LUERLOCK TIP W/O SFTY DISP

## (undated) DEVICE — GLOVE ORANGE PI 7 1/2   MSG9075

## (undated) DEVICE — LEAD IMPLANT KIT: Brand: AXONICS

## (undated) DEVICE — 3M™ TEGADERM™ TRANSPARENT FILM DRESSING FRAME STYLE, 1628, 6 IN X 8 IN (15 CM X 20 CM), 10/CT 8CT/CASE: Brand: 3M™ TEGADERM™

## (undated) DEVICE — CHARGING SYSTEM: Brand: AXONICS